# Patient Record
Sex: FEMALE | Race: WHITE | NOT HISPANIC OR LATINO | Employment: OTHER | URBAN - METROPOLITAN AREA
[De-identification: names, ages, dates, MRNs, and addresses within clinical notes are randomized per-mention and may not be internally consistent; named-entity substitution may affect disease eponyms.]

---

## 2017-02-13 ENCOUNTER — GENERIC CONVERSION - ENCOUNTER (OUTPATIENT)
Dept: OTHER | Facility: OTHER | Age: 22
End: 2017-02-13

## 2017-05-01 ENCOUNTER — GENERIC CONVERSION - ENCOUNTER (OUTPATIENT)
Dept: OTHER | Facility: OTHER | Age: 22
End: 2017-05-01

## 2017-05-22 ENCOUNTER — ALLSCRIPTS OFFICE VISIT (OUTPATIENT)
Dept: OTHER | Facility: OTHER | Age: 22
End: 2017-05-22

## 2017-05-30 LAB
ADDITIONAL INFORMATION (HISTORICAL): NORMAL
ADEQUACY: (HISTORICAL): NORMAL
CYTOTECHNOLOGIST: (HISTORICAL): NORMAL
INTERPRETATION (HISTORICAL): NORMAL
LMP (HISTORICAL): NORMAL
PREV. BX: (HISTORICAL): NORMAL
PREV. PAP (HISTORICAL): NORMAL
SOURCE (HISTORICAL): NORMAL

## 2017-07-06 ENCOUNTER — ALLSCRIPTS OFFICE VISIT (OUTPATIENT)
Dept: OTHER | Facility: OTHER | Age: 22
End: 2017-07-06

## 2018-01-12 VITALS
HEIGHT: 65 IN | BODY MASS INDEX: 25.99 KG/M2 | DIASTOLIC BLOOD PRESSURE: 76 MMHG | WEIGHT: 156 LBS | SYSTOLIC BLOOD PRESSURE: 106 MMHG

## 2018-01-12 VITALS
SYSTOLIC BLOOD PRESSURE: 112 MMHG | BODY MASS INDEX: 26.16 KG/M2 | HEIGHT: 65 IN | WEIGHT: 157 LBS | DIASTOLIC BLOOD PRESSURE: 72 MMHG

## 2018-01-16 NOTE — PROGRESS NOTES
Active Problems    1  Contraception (V25 9) (Z30 9)    Current Meds   1  MedroxyPROGESTERone Acetate 150 MG/ML Intramuscular Suspension; INJECT   EVERY 12 WEEKS AS DIRECTED; Therapy: [id number] to (Last Rx:08Sep2016)  Requested for: 08Sep2016; Status:   ACTIVE - Retrospective By Protocol Authorization Ordered    Vitals  Signs    Systolic: 620  Diastolic: 70  Height: 5 ft 5 in  Weight: 154 lb   BMI Calculated: 25 63  BSA Calculated: 1 77    Plan  Contraception    · MedroxyPROGESTERone Acetate 150 MG/ML Intramuscular Suspension  (Depo-Provera); INJECT EVERY 12 WEEKS AS DIRECTED   · Depo-Provera 150 MG/ML Intramuscular Suspension  (MedroxyPROGESTERone Acetate)   · Urine HCG- POC; Status:Active - Perform Order; Requested [id number];     Discussion/Summary    Pt gave herself depo injection on 6/24/16, per pt Jaron Magallon RN told her that she could administer her own depo because she was not able to come in at scheduled time or reschedule her appointment    Pt was supposed to call in with ht, wt, BP, lot # and expiration  Pt did not call in with any of this information  Pt threw out her medication box and forgot to call it in  [facility] to [facility]  KA ordered hpt test prior to administration, and pt must have RN visit and never give herself injection again  Pt verbalized understanding  hpt was negative  Depo injection administered  Pt to schedule next depo for 11 wks        Future Appointments    Date/Time Provider Specialty Site   11/23/2016 09:30 AM OB/GYN [facility], Nurse Schedule  [facility]     Signatures   Electronically signed by : Vance Spangler RN; Sep  8 2016 10:47AM EST                       (Author)    Electronically signed by : Neil García DO; Sep  8 2016  2:22PM EST                       (Author)

## 2018-01-22 VITALS
WEIGHT: 157 LBS | BODY MASS INDEX: 26.16 KG/M2 | DIASTOLIC BLOOD PRESSURE: 76 MMHG | SYSTOLIC BLOOD PRESSURE: 110 MMHG | HEIGHT: 65 IN

## 2018-01-22 VITALS
BODY MASS INDEX: 26.16 KG/M2 | WEIGHT: 157 LBS | SYSTOLIC BLOOD PRESSURE: 110 MMHG | DIASTOLIC BLOOD PRESSURE: 70 MMHG | HEIGHT: 65 IN

## 2018-05-10 ENCOUNTER — ANNUAL EXAM (OUTPATIENT)
Dept: OBGYN CLINIC | Facility: CLINIC | Age: 23
End: 2018-05-10
Payer: COMMERCIAL

## 2018-05-10 VITALS
WEIGHT: 143 LBS | SYSTOLIC BLOOD PRESSURE: 102 MMHG | DIASTOLIC BLOOD PRESSURE: 66 MMHG | BODY MASS INDEX: 23.82 KG/M2 | HEIGHT: 65 IN

## 2018-05-10 DIAGNOSIS — Z01.419 ENCOUNTER FOR ANNUAL ROUTINE GYNECOLOGICAL EXAMINATION: Primary | ICD-10-CM

## 2018-05-10 DIAGNOSIS — T83.32XD INTRAUTERINE CONTRACEPTIVE DEVICE THREADS LOST, SUBSEQUENT ENCOUNTER: ICD-10-CM

## 2018-05-10 PROCEDURE — S0612 ANNUAL GYNECOLOGICAL EXAMINA: HCPCS | Performed by: OBSTETRICS & GYNECOLOGY

## 2018-05-10 RX ORDER — CYANOCOBALAMIN (VITAMIN B-12) 500 MCG
LOZENGE ORAL
COMMUNITY
End: 2018-10-30

## 2018-05-10 RX ORDER — BUSPIRONE HYDROCHLORIDE 5 MG/1
TABLET ORAL
COMMUNITY
End: 2018-05-10 | Stop reason: ALTCHOICE

## 2018-05-10 RX ORDER — CLONAZEPAM 0.5 MG/1
TABLET ORAL
COMMUNITY
End: 2018-05-10 | Stop reason: ALTCHOICE

## 2018-05-10 RX ORDER — ALPRAZOLAM 0.5 MG/1
TABLET ORAL 4 TIMES DAILY PRN
COMMUNITY
Start: 2018-02-23 | End: 2022-05-30

## 2018-05-10 NOTE — PROGRESS NOTES
Assessment/Plan:    Pap smear deferred due to low risk status  Encouraged self-breast examination as well as calcium supplementation  Recommend pelvic ultrasound follow-up IUD  I will notify her the above results via telephone  Provided the above is normal she will return to office in 1 year  No problem-specific Assessment & Plan notes found for this encounter  Diagnoses and all orders for this visit:    Encounter for annual routine gynecological examination    Intrauterine contraceptive device threads lost, subsequent encounter  -     US pelvis complete w transvaginal; Future    Other orders  -     ALPRAZolam (XANAX) 0 5 mg tablet;   -     Discontinue: busPIRone (BUSPAR) 5 mg tablet; TAKE 1 TABLET TWICE A DAY  -     Discontinue: clonazePAM (KlonoPIN) 0 5 mg tablet; TAKE 1 TABLET AT BEDTIME  -     mupirocin (BACTROBAN) 2 % ointment; Apply into each nostril 2 (two) times a day for 5 days  Use one-half of tube in each nostril, press sides of nose together and gently massage   -     Vitamin E 400 units TABS; Take by mouth          Subjective:      Patient ID: Jocy Garcia is a 25 y o  female  HPI     This is a 26-year-old female  who presents for annual gyn exam   She had the Mirena IUD inserted on 2017  She states the first several months she had severe menstrual cramping which then eventually resolved  She denies any vaginal bleeding or spotting over the last 6 months  She is sexually active and has been in a monogamous relationship for the last 3 years  She is unable to palpate the IUD string  Her last Pap smear 2017 within normal limits  She did receive the Gardasil vaccine at age 13    Patient is currently finishing her antolin year of college, major [employer]    The following portions of the patient's history were reviewed and updated as appropriate: allergies, current medications, past family history, past medical history, past social history, past surgical history and problem list     Review of Systems   Constitutional: Negative for fatigue, fever and unexpected weight change  Respiratory: Negative for cough, chest tightness, shortness of breath and wheezing  Cardiovascular: Negative  Negative for chest pain and palpitations  Gastrointestinal: Negative  Negative for abdominal distention, abdominal pain, blood in stool, constipation, diarrhea, nausea and vomiting  Genitourinary: Negative  Negative for difficulty urinating, dyspareunia, dysuria, flank pain, frequency, genital sores, hematuria, pelvic pain, urgency, vaginal bleeding, vaginal discharge and vaginal pain  Skin: Negative for rash  Objective:      /66   Ht 5' 5" (1 651 m)   Wt 64 9 kg (143 lb)   LMP  (LMP Unknown)   Breastfeeding? No   BMI 23 80 kg/m²          Physical Exam   Constitutional: She appears well-developed and well-nourished  Cardiovascular: Normal rate and regular rhythm  Pulmonary/Chest: Effort normal and breath sounds normal  Right breast exhibits no inverted nipple, no mass, no nipple discharge, no skin change and no tenderness  Left breast exhibits no inverted nipple, no mass, no nipple discharge, no skin change and no tenderness  Abdominal: Soft  Bowel sounds are normal  She exhibits no distension  There is no tenderness  There is no rebound and no guarding  Genitourinary: Vagina normal and uterus normal  There is no lesion on the right labia  There is no lesion on the left labia  Cervix exhibits no discharge and no friability  Right adnexum displays no mass, no tenderness and no fullness  Left adnexum displays no mass, no tenderness and no fullness  No vaginal discharge found  Genitourinary Comments:  The IUD string is not visualized today

## 2018-05-17 ENCOUNTER — HOSPITAL ENCOUNTER (OUTPATIENT)
Dept: RADIOLOGY | Facility: HOSPITAL | Age: 23
Discharge: HOME | End: 2018-05-17
Attending: OBSTETRICS & GYNECOLOGY
Payer: COMMERCIAL

## 2018-05-17 DIAGNOSIS — T83.32XD INTRAUTERINE CONTRACEPTIVE DEVICE THREADS LOST, SUBSEQUENT ENCOUNTER: ICD-10-CM

## 2018-05-17 PROCEDURE — 76856 US EXAM PELVIC COMPLETE: CPT

## 2018-05-21 ENCOUNTER — TELEPHONE (OUTPATIENT)
Dept: OBGYN CLINIC | Facility: CLINIC | Age: 23
End: 2018-05-21

## 2018-05-21 NOTE — TELEPHONE ENCOUNTER
----- Message from Jill Butterfield DO sent at 5/21/2018  2:11 PM EDT -----  Inform pt US reveals proper location of IUD, resume annual gyn exams

## 2018-09-04 ENCOUNTER — TELEPHONE (OUTPATIENT)
Dept: OBGYN CLINIC | Facility: CLINIC | Age: 23
End: 2018-09-04

## 2018-09-04 NOTE — TELEPHONE ENCOUNTER
Pt having sx of nausea/vomiting intermittently past 2 - 2 1/2 wks - had Mirena IUD placed 7/6/17 with IUD showing proper location - had yearly 5/2018 then f/u u/s to check IUD placement which was satisfactory  She is concerned about pregnancy - recom to do HPT then if neg to repeat in 2 wks after last time of intercourse (which was 9/3/18)  She will recall with result - had scheduled appt here for next wk when she called today

## 2018-09-04 NOTE — TELEPHONE ENCOUNTER
Pt wants to know if can have blood work prior to having appointment with dr Aubree Meza  Do to wait gain, tenderness, moodiness, dizziness   please call [phone]

## 2018-09-11 ENCOUNTER — OFFICE VISIT (OUTPATIENT)
Dept: OBGYN CLINIC | Facility: CLINIC | Age: 23
End: 2018-09-11
Payer: COMMERCIAL

## 2018-09-11 VITALS
WEIGHT: 144 LBS | BODY MASS INDEX: 23.99 KG/M2 | DIASTOLIC BLOOD PRESSURE: 62 MMHG | HEIGHT: 65 IN | SYSTOLIC BLOOD PRESSURE: 108 MMHG

## 2018-09-11 DIAGNOSIS — N91.2 AMENORRHEA: Primary | ICD-10-CM

## 2018-09-11 PROCEDURE — 99214 OFFICE O/P EST MOD 30 MIN: CPT | Performed by: OBSTETRICS & GYNECOLOGY

## 2018-09-11 NOTE — PROGRESS NOTES
Assessment/Plan:    Will check quantitative HCG  Patient will call for results tomorrow  She would like to have the IUD removed under anesthesia  We will discuss this further tomorrow  All questions answered  No problem-specific Assessment & Plan notes found for this encounter  Diagnoses and all orders for this visit:    Amenorrhea  -     hCG, quantitative  -     hCG, quantitative          Subjective:      Patient ID: Keshav Oliveira is a 21 y o  female  HPI     This is a 25-year-old female  (SAB times 2) presents complaining of nausea, weight gain, fatigue over the last 4 weeks  She has also had some episodes of vomiting  She did check a home pregnancy test 7 days prior which was negative  She has had the Mirena IUD now first 1 year  She denies any vaginal bleeding or spotting/staining over the last 8 months  On her last annual visit  the IUD string was not visualized and a pelvic ultrasound was performed which had confirmed proper location of the IUD  She is sexually active and has been in a monogamous relationship for 3 years  Her partner have been discussing pregnancy in the near future  At this point she would like to have the IUD removed  We discussed performing under anesthesia versus attempting Cytotec intravaginally followed by attempting removing IUD in the office  The following portions of the patient's history were reviewed and updated as appropriate: allergies, current medications, past family history, past medical history, past social history, past surgical history and problem list     Review of Systems   Constitutional: Positive for fatigue  Negative for fever and unexpected weight change  Respiratory: Negative for cough, chest tightness, shortness of breath and wheezing  Cardiovascular: Negative  Negative for chest pain and palpitations  Gastrointestinal: Positive for nausea   Negative for abdominal distention, abdominal pain, blood in stool, constipation, diarrhea and vomiting  Genitourinary: Negative  Negative for difficulty urinating, dyspareunia, dysuria, flank pain, frequency, genital sores, hematuria, pelvic pain, urgency, vaginal bleeding, vaginal discharge and vaginal pain  Skin: Negative for rash  Objective:      /62   Ht 5' 5" (1 651 m)   Wt 65 3 kg (144 lb)   Breastfeeding? No   BMI 23 96 kg/m²          Physical Exam      External genitalia is within normal limits  The vagina is well estrogenized  Cervix is small the os is closed  Again, the IUD string is not visualized today

## 2018-09-12 ENCOUNTER — TRANSCRIBE ORDERS (OUTPATIENT)
Dept: LAB | Facility: CLINIC | Age: 23
End: 2018-09-12

## 2018-09-12 ENCOUNTER — APPOINTMENT (OUTPATIENT)
Dept: LAB | Facility: CLINIC | Age: 23
End: 2018-09-12
Payer: COMMERCIAL

## 2018-09-12 DIAGNOSIS — N91.2 AMENORRHEA: Primary | ICD-10-CM

## 2018-09-12 DIAGNOSIS — N91.2 AMENORRHEA: ICD-10-CM

## 2018-09-12 LAB — B-HCG SERPL-ACNC: <2 MIU/ML

## 2018-09-12 PROCEDURE — 36415 COLL VENOUS BLD VENIPUNCTURE: CPT | Performed by: OBSTETRICS & GYNECOLOGY

## 2018-09-12 PROCEDURE — 84702 CHORIONIC GONADOTROPIN TEST: CPT | Performed by: OBSTETRICS & GYNECOLOGY

## 2018-09-24 ENCOUNTER — TELEPHONE (OUTPATIENT)
Dept: OBGYN CLINIC | Facility: CLINIC | Age: 23
End: 2018-09-24

## 2018-09-24 NOTE — TELEPHONE ENCOUNTER
Pt informed of surgery date 10-25-18 in PM, pre-op appt scheduled  Informed patient may need pre-op labs, will give lab slip at pre-op appt and will inform if any additional testing is needed

## 2018-09-26 PROBLEM — Z30.432 ENCOUNTER FOR REMOVAL OF INTRAUTERINE CONTRACEPTIVE DEVICE: Status: ACTIVE | Noted: 2018-09-26

## 2018-10-11 ENCOUNTER — OFFICE VISIT (OUTPATIENT)
Dept: OBGYN CLINIC | Facility: CLINIC | Age: 23
End: 2018-10-11

## 2018-10-11 VITALS
WEIGHT: 145.6 LBS | SYSTOLIC BLOOD PRESSURE: 110 MMHG | DIASTOLIC BLOOD PRESSURE: 62 MMHG | HEIGHT: 65 IN | BODY MASS INDEX: 24.26 KG/M2

## 2018-10-11 DIAGNOSIS — Z01.818 PREOP EXAMINATION: Primary | ICD-10-CM

## 2018-10-11 PROCEDURE — PREOP: Performed by: OBSTETRICS & GYNECOLOGY

## 2018-10-11 NOTE — PROGRESS NOTES
Assessment/Plan:    Patient was consented for examine anesthesia, removal of IUD, possible hysteroscopy  She understands the risks and benefits and alternative treatment in grease to proceed  Risks including but not limited to infection, venous thrombosis, pulmonary embolism, wound healing problems, blood loss, damage to other organs including uterus, bowel, bladder  Will obtain pre admission labs  She will schedule her 2 week postop check  No problem-specific Assessment & Plan notes found for this encounter  There are no diagnoses linked to this encounter  Subjective:      Patient ID: Deann Olivas is a 21 y o  female  HPI     This is a 66-year-old female  (SAB x2) presents for preoperative counseling  Patient is scheduled for examine anesthesia, removal of IUD, possible hysteroscopy  She had requested to have her IUD removed after placement approximately 1 year ago  Attempt was made to remove the IUD in office in was unsuccessful  She did have a pelvic ultrasound done 2018 which had revealed proper location of the IUD  Patient has been amenorrheic for the last 8 months  She has been periodically checking home pregnancy test which were negative  Overall she did not like the effects of the IUD including nausea, weight gain, fatigue  Her her partner are interested in pregnancy in the near future  The following portions of the patient's history were reviewed and updated as appropriate: allergies, current medications, past family history, past medical history, past social history, past surgical history and problem list     Review of Systems   Constitutional: Negative for fatigue, fever and unexpected weight change  Respiratory: Negative for cough, chest tightness, shortness of breath and wheezing  Cardiovascular: Negative  Negative for chest pain and palpitations  Gastrointestinal: Negative    Negative for abdominal distention, abdominal pain, blood in stool, constipation, diarrhea, nausea and vomiting  Genitourinary: Negative  Negative for difficulty urinating, dyspareunia, dysuria, flank pain, frequency, genital sores, hematuria, pelvic pain, urgency, vaginal bleeding, vaginal discharge and vaginal pain  Skin: Negative for rash  Objective:      /62   Ht 5' 5" (1 651 m)   Wt 66 kg (145 lb 9 6 oz)   Breastfeeding? No   BMI 24 23 kg/m²          Physical Exam   Constitutional: She appears well-developed and well-nourished  Cardiovascular: Normal rate and regular rhythm  Pulmonary/Chest: Effort normal and breath sounds normal  Right breast exhibits no inverted nipple, no mass, no nipple discharge, no skin change and no tenderness  Left breast exhibits no inverted nipple, no mass, no nipple discharge, no skin change and no tenderness  Abdominal: Soft  Bowel sounds are normal  She exhibits no distension  There is no tenderness  There is no rebound and no guarding  Genitourinary: Vagina normal and uterus normal  There is no lesion on the right labia  There is no lesion on the left labia  Cervix exhibits no discharge and no friability  Right adnexum displays no mass, no tenderness and no fullness  Left adnexum displays no mass, no tenderness and no fullness  No vaginal discharge found

## 2018-10-30 NOTE — PRE-PROCEDURE INSTRUCTIONS
Pre-Surgery Instructions:   Medication Instructions    ALPRAZolam (XANAX) 0 5 mg tablet Instructed patient per Anesthesia Guidelines  REVIEWED  PRINTED SURGICAL INSTRUCTIONS WITH PATIENT , PATIENT VERBALIZED UNDERSTANDING   MEDICATIONS REVIEWED

## 2018-10-31 NOTE — H&P
H&P Exam - Gynecology   Radha Washburn 21 y o  female MRN: [id number]  Unit/Bed#:  Encounter: [id number]    Assessment/Plan     Assessment:    Retained IUD    Plan:    Patient was consented for examine anesthesia, removal of IUD, possible hysteroscopy  She understands the risks and benefits and alternative treatment in grease to proceed  Risks including but not limited to infection, venous thrombosis, pulmonary embolism, wound healing problems, blood loss, damage to other organs including uterus, bowel, bladder  Will obtain pre admission labs  She will schedule her 2 week postop check  History of Present Illness     HPI:  Radha Washburn is a 21 y o  female  (SAB x2) scheduled for examine anesthesia, removal of IUD, possible hysteroscopy  She had requested to have her IUD removed after placement approximately 1 year ago  Attempt was made to remove the IUD in office in was unsuccessful  She did have a pelvic ultrasound done 2018 which had revealed proper location of the IUD  Patient has been amenorrheic for the last 8 months  She has been periodically checking home pregnancy test which were negative  Overall she did not like the effects of the IUD including nausea, weight gain, fatigue  Her her partner are interested in pregnancy in the near future       Review of Systems   Constitutional: Negative for fatigue, fever and unexpected weight change  Respiratory: Negative for cough, chest tightness, shortness of breath and wheezing  Cardiovascular: Negative  Negative for chest pain and palpitations  Gastrointestinal: Negative  Negative for abdominal distention, abdominal pain, blood in stool, constipation, diarrhea, nausea and vomiting  Genitourinary: Negative  Negative for difficulty urinating, dyspareunia, dysuria, flank pain, frequency, genital sores, hematuria, pelvic pain, urgency, vaginal bleeding, vaginal discharge and vaginal pain  Skin: Negative for rash         Historical Information   Past Medical History:   Diagnosis Date    Acne     Seasonal depression ([facility] [address] )      Past Surgical History:   Procedure Laterality Date    COLONOSCOPY      RHINOPLASTY  2013    WISDOM TOOTH EXTRACTION       OB/GYN History: as above    Family History   Problem Relation Age of Onset    Colon cancer Maternal Grandmother      Social History   History   Alcohol Use No     History   Drug Use No     History   Smoking Status    Never Smoker   Smokeless Tobacco    Never Used       Meds/Allergies   all current active meds have been reviewed  Allergies   Allergen Reactions    Keo Flavor      anaphylaxis    Pineapple Anaphylaxis     Anaphylaxis    Zithromax [Azithromycin] Anaphylaxis    Latex        Objective   Vitals: Weight 63 5 kg (140 lb), not currently breastfeeding  No intake or output data in the 24 hours ending 10/31/18 0749    Invasive Devices: Invasive Devices          No matching active lines, drains, or airways          Physical Exam   Constitutional: She appears well-developed and well-nourished  Cardiovascular: Normal rate and regular rhythm  Pulmonary/Chest: Effort normal and breath sounds normal  Right breast exhibits no inverted nipple, no mass, no nipple discharge, no skin change and no tenderness  Left breast exhibits no inverted nipple, no mass, no nipple discharge, no skin change and no tenderness  Abdominal: Soft  Bowel sounds are normal  She exhibits no distension  There is no tenderness  There is no rebound and no guarding  Genitourinary: Vagina normal and uterus normal  There is no lesion on the right labia  There is no lesion on the left labia  Cervix exhibits no discharge and no friability  Right adnexum displays no mass, no tenderness and no fullness  Left adnexum displays no mass, no tenderness and no fullness  No vaginal discharge found  Lab Results: I have personally reviewed pertinent reports  Imaging: I have personally reviewed pertinent reports  EKG, Pathology, and Other Studies: I have personally reviewed pertinent reports  Code Status: No Order  Advance Directive and Living Will:      Power of :    POLST:      Counseling / Coordination of Care  Total floor / unit time spent today 30 minutes  Greater than 50% of total time was spent with the patient and / or family counseling and / or coordination of care

## 2018-11-05 ENCOUNTER — APPOINTMENT (OUTPATIENT)
Dept: LAB | Facility: CLINIC | Age: 23
End: 2018-11-05
Payer: COMMERCIAL

## 2018-11-05 ENCOUNTER — ANESTHESIA EVENT (OUTPATIENT)
Dept: PERIOP | Facility: HOSPITAL | Age: 23
End: 2018-11-05
Payer: COMMERCIAL

## 2018-11-05 DIAGNOSIS — Z01.818 PREOP TESTING: ICD-10-CM

## 2018-11-05 LAB
ANION GAP SERPL CALCULATED.3IONS-SCNC: 3 MMOL/L (ref 4–13)
B-HCG SERPL-ACNC: <2 MIU/ML
BASOPHILS # BLD AUTO: 0.04 THOUSANDS/ΜL (ref 0–0.1)
BASOPHILS NFR BLD AUTO: 1 % (ref 0–1)
BUN SERPL-MCNC: 10 MG/DL (ref 5–25)
CALCIUM SERPL-MCNC: 9.3 MG/DL (ref 8.3–10.1)
CHLORIDE SERPL-SCNC: 107 MMOL/L (ref 100–108)
CO2 SERPL-SCNC: 28 MMOL/L (ref 21–32)
CREAT SERPL-MCNC: 0.92 MG/DL (ref 0.6–1.3)
EOSINOPHIL # BLD AUTO: 0.13 THOUSAND/ΜL (ref 0–0.61)
EOSINOPHIL NFR BLD AUTO: 2 % (ref 0–6)
ERYTHROCYTE [DISTWIDTH] IN BLOOD BY AUTOMATED COUNT: 11.8 % (ref 11.6–15.1)
GFR SERPL CREATININE-BSD FRML MDRD: 88 ML/MIN/1.73SQ M
GLUCOSE SERPL-MCNC: 70 MG/DL (ref 65–140)
HCT VFR BLD AUTO: 41.1 % (ref 34.8–46.1)
HGB BLD-MCNC: 13.3 G/DL (ref 11.5–15.4)
IMM GRANULOCYTES # BLD AUTO: 0.02 THOUSAND/UL (ref 0–0.2)
IMM GRANULOCYTES NFR BLD AUTO: 0 % (ref 0–2)
LYMPHOCYTES # BLD AUTO: 2.29 THOUSANDS/ΜL (ref 0.6–4.47)
LYMPHOCYTES NFR BLD AUTO: 40 % (ref 14–44)
MCH RBC QN AUTO: 29.8 PG (ref 26.8–34.3)
MCHC RBC AUTO-ENTMCNC: 32.4 G/DL (ref 31.4–37.4)
MCV RBC AUTO: 92 FL (ref 82–98)
MONOCYTES # BLD AUTO: 0.49 THOUSAND/ΜL (ref 0.17–1.22)
MONOCYTES NFR BLD AUTO: 8 % (ref 4–12)
NEUTROPHILS # BLD AUTO: 2.83 THOUSANDS/ΜL (ref 1.85–7.62)
NEUTS SEG NFR BLD AUTO: 49 % (ref 43–75)
NRBC BLD AUTO-RTO: 0 /100 WBCS
PLATELET # BLD AUTO: 271 THOUSANDS/UL (ref 149–390)
PMV BLD AUTO: 9.5 FL (ref 8.9–12.7)
POTASSIUM SERPL-SCNC: 3.7 MMOL/L (ref 3.5–5.3)
RBC # BLD AUTO: 4.47 MILLION/UL (ref 3.81–5.12)
SODIUM SERPL-SCNC: 138 MMOL/L (ref 136–145)
WBC # BLD AUTO: 5.8 THOUSAND/UL (ref 4.31–10.16)

## 2018-11-05 PROCEDURE — 84702 CHORIONIC GONADOTROPIN TEST: CPT | Performed by: OBSTETRICS & GYNECOLOGY

## 2018-11-05 PROCEDURE — 80048 BASIC METABOLIC PNL TOTAL CA: CPT

## 2018-11-05 PROCEDURE — 85025 COMPLETE CBC W/AUTO DIFF WBC: CPT

## 2018-11-05 PROCEDURE — 36415 COLL VENOUS BLD VENIPUNCTURE: CPT

## 2018-11-06 ENCOUNTER — ANESTHESIA (OUTPATIENT)
Dept: PERIOP | Facility: HOSPITAL | Age: 23
End: 2018-11-06
Payer: COMMERCIAL

## 2018-11-06 ENCOUNTER — HOSPITAL ENCOUNTER (OUTPATIENT)
Facility: HOSPITAL | Age: 23
Setting detail: OUTPATIENT SURGERY
Discharge: HOME | End: 2018-11-06
Attending: OBSTETRICS & GYNECOLOGY | Admitting: OBSTETRICS & GYNECOLOGY
Payer: COMMERCIAL

## 2018-11-06 VITALS
RESPIRATION RATE: 16 BRPM | BODY MASS INDEX: 23.32 KG/M2 | HEIGHT: 65 IN | OXYGEN SATURATION: 100 % | HEART RATE: 62 BPM | TEMPERATURE: 96.9 F | DIASTOLIC BLOOD PRESSURE: 61 MMHG | WEIGHT: 140 LBS | SYSTOLIC BLOOD PRESSURE: 107 MMHG

## 2018-11-06 PROBLEM — M25.569 KNEE PAIN: Status: ACTIVE | Noted: 2018-11-06

## 2018-11-06 PROBLEM — M25.531 RIGHT WRIST PAIN: Status: ACTIVE | Noted: 2017-01-10

## 2018-11-06 PROBLEM — M70.50: Status: ACTIVE | Noted: 2018-11-06

## 2018-11-06 PROBLEM — S83.529A: Status: ACTIVE | Noted: 2018-11-06

## 2018-11-06 PROCEDURE — 58301 REMOVE INTRAUTERINE DEVICE: CPT | Performed by: OBSTETRICS & GYNECOLOGY

## 2018-11-06 RX ORDER — LIDOCAINE HYDROCHLORIDE 10 MG/ML
INJECTION, SOLUTION INFILTRATION; PERINEURAL AS NEEDED
Status: DISCONTINUED | OUTPATIENT
Start: 2018-11-06 | End: 2018-11-06 | Stop reason: SURG

## 2018-11-06 RX ORDER — KETOROLAC TROMETHAMINE 30 MG/ML
INJECTION, SOLUTION INTRAMUSCULAR; INTRAVENOUS AS NEEDED
Status: DISCONTINUED | OUTPATIENT
Start: 2018-11-06 | End: 2018-11-06 | Stop reason: SURG

## 2018-11-06 RX ORDER — OXYCODONE HYDROCHLORIDE AND ACETAMINOPHEN 5; 325 MG/1; MG/1
1 TABLET ORAL EVERY 4 HOURS PRN
Status: DISCONTINUED | OUTPATIENT
Start: 2018-11-06 | End: 2018-11-06 | Stop reason: HOSPADM

## 2018-11-06 RX ORDER — SODIUM CHLORIDE, SODIUM LACTATE, POTASSIUM CHLORIDE, CALCIUM CHLORIDE 600; 310; 30; 20 MG/100ML; MG/100ML; MG/100ML; MG/100ML
125 INJECTION, SOLUTION INTRAVENOUS CONTINUOUS
Status: DISCONTINUED | OUTPATIENT
Start: 2018-11-06 | End: 2018-11-06 | Stop reason: HOSPADM

## 2018-11-06 RX ORDER — METOCLOPRAMIDE HYDROCHLORIDE 5 MG/ML
10 INJECTION INTRAMUSCULAR; INTRAVENOUS ONCE AS NEEDED
Status: COMPLETED | OUTPATIENT
Start: 2018-11-06 | End: 2018-11-06

## 2018-11-06 RX ORDER — AMOXICILLIN 500 MG/1
500 TABLET, FILM COATED ORAL
COMMUNITY
End: 2018-12-13

## 2018-11-06 RX ORDER — MEPERIDINE HYDROCHLORIDE 25 MG/ML
12.5 INJECTION INTRAMUSCULAR; INTRAVENOUS; SUBCUTANEOUS
Status: DISCONTINUED | OUTPATIENT
Start: 2018-11-06 | End: 2018-11-06 | Stop reason: HOSPADM

## 2018-11-06 RX ORDER — DIPHENHYDRAMINE HYDROCHLORIDE 50 MG/ML
12.5 INJECTION INTRAMUSCULAR; INTRAVENOUS ONCE AS NEEDED
Status: DISCONTINUED | OUTPATIENT
Start: 2018-11-06 | End: 2018-11-06 | Stop reason: HOSPADM

## 2018-11-06 RX ORDER — FENTANYL CITRATE/PF 50 MCG/ML
50 SYRINGE (ML) INJECTION
Status: COMPLETED | OUTPATIENT
Start: 2018-11-06 | End: 2018-11-06

## 2018-11-06 RX ORDER — ONDANSETRON 2 MG/ML
4 INJECTION INTRAMUSCULAR; INTRAVENOUS EVERY 6 HOURS PRN
Status: DISCONTINUED | OUTPATIENT
Start: 2018-11-06 | End: 2018-11-06 | Stop reason: HOSPADM

## 2018-11-06 RX ORDER — ONDANSETRON 2 MG/ML
INJECTION INTRAMUSCULAR; INTRAVENOUS AS NEEDED
Status: DISCONTINUED | OUTPATIENT
Start: 2018-11-06 | End: 2018-11-06 | Stop reason: SURG

## 2018-11-06 RX ORDER — PROPOFOL 10 MG/ML
INJECTION, EMULSION INTRAVENOUS AS NEEDED
Status: DISCONTINUED | OUTPATIENT
Start: 2018-11-06 | End: 2018-11-06 | Stop reason: SURG

## 2018-11-06 RX ORDER — OXYCODONE HYDROCHLORIDE AND ACETAMINOPHEN 5; 325 MG/1; MG/1
2 TABLET ORAL EVERY 4 HOURS PRN
Status: DISCONTINUED | OUTPATIENT
Start: 2018-11-06 | End: 2018-11-06 | Stop reason: HOSPADM

## 2018-11-06 RX ORDER — ACETAMINOPHEN 325 MG/1
650 TABLET ORAL EVERY 6 HOURS PRN
Status: DISCONTINUED | OUTPATIENT
Start: 2018-11-06 | End: 2018-11-06 | Stop reason: HOSPADM

## 2018-11-06 RX ORDER — HYDROMORPHONE HCL/PF 1 MG/ML
0.2 SYRINGE (ML) INJECTION
Status: DISCONTINUED | OUTPATIENT
Start: 2018-11-06 | End: 2018-11-06 | Stop reason: HOSPADM

## 2018-11-06 RX ORDER — ONDANSETRON 2 MG/ML
4 INJECTION INTRAMUSCULAR; INTRAVENOUS ONCE AS NEEDED
Status: COMPLETED | OUTPATIENT
Start: 2018-11-06 | End: 2018-11-06

## 2018-11-06 RX ORDER — LORAZEPAM 2 MG/ML
0.5 INJECTION INTRAMUSCULAR ONCE
Status: COMPLETED | OUTPATIENT
Start: 2018-11-06 | End: 2018-11-06

## 2018-11-06 RX ORDER — ALPRAZOLAM 0.5 MG/1
0.5 TABLET ORAL 3 TIMES DAILY PRN
COMMUNITY
Start: 2018-02-23 | End: 2018-11-06 | Stop reason: HOSPADM

## 2018-11-06 RX ORDER — MIDAZOLAM HYDROCHLORIDE 1 MG/ML
INJECTION INTRAMUSCULAR; INTRAVENOUS AS NEEDED
Status: DISCONTINUED | OUTPATIENT
Start: 2018-11-06 | End: 2018-11-06 | Stop reason: SURG

## 2018-11-06 RX ORDER — SODIUM CHLORIDE 9 MG/ML
125 INJECTION, SOLUTION INTRAVENOUS CONTINUOUS
Status: DISCONTINUED | OUTPATIENT
Start: 2018-11-06 | End: 2018-11-06 | Stop reason: HOSPADM

## 2018-11-06 RX ORDER — SCOLOPAMINE TRANSDERMAL SYSTEM 1 MG/1
1 PATCH, EXTENDED RELEASE TRANSDERMAL
Status: DISCONTINUED | OUTPATIENT
Start: 2018-11-06 | End: 2018-11-06 | Stop reason: HOSPADM

## 2018-11-06 RX ORDER — FENTANYL CITRATE 50 UG/ML
INJECTION, SOLUTION INTRAMUSCULAR; INTRAVENOUS AS NEEDED
Status: DISCONTINUED | OUTPATIENT
Start: 2018-11-06 | End: 2018-11-06 | Stop reason: SURG

## 2018-11-06 RX ORDER — IBUPROFEN 600 MG/1
600 TABLET ORAL EVERY 6 HOURS PRN
Status: DISCONTINUED | OUTPATIENT
Start: 2018-11-06 | End: 2018-11-06 | Stop reason: HOSPADM

## 2018-11-06 RX ADMIN — FENTANYL CITRATE 50 MCG: 50 INJECTION, SOLUTION INTRAMUSCULAR; INTRAVENOUS at 12:40

## 2018-11-06 RX ADMIN — SODIUM CHLORIDE, SODIUM LACTATE, POTASSIUM CHLORIDE, AND CALCIUM CHLORIDE: .6; .31; .03; .02 INJECTION, SOLUTION INTRAVENOUS at 12:08

## 2018-11-06 RX ADMIN — DEXAMETHASONE SODIUM PHOSPHATE 10 MG: 10 INJECTION INTRAMUSCULAR; INTRAVENOUS at 12:53

## 2018-11-06 RX ADMIN — SODIUM CHLORIDE, SODIUM LACTATE, POTASSIUM CHLORIDE, AND CALCIUM CHLORIDE 125 ML/HR: .6; .31; .03; .02 INJECTION, SOLUTION INTRAVENOUS at 11:45

## 2018-11-06 RX ADMIN — LIDOCAINE HYDROCHLORIDE 50 MG: 10 INJECTION, SOLUTION INFILTRATION; PERINEURAL at 12:37

## 2018-11-06 RX ADMIN — HYDROMORPHONE HYDROCHLORIDE 0.2 MG: 1 INJECTION, SOLUTION INTRAMUSCULAR; INTRAVENOUS; SUBCUTANEOUS at 14:00

## 2018-11-06 RX ADMIN — FENTANYL CITRATE 50 MCG: 50 INJECTION, SOLUTION INTRAMUSCULAR; INTRAVENOUS at 13:22

## 2018-11-06 RX ADMIN — MIDAZOLAM 2 MG: 1 INJECTION INTRAMUSCULAR; INTRAVENOUS at 12:27

## 2018-11-06 RX ADMIN — SCOPALAMINE 1 PATCH: 1 PATCH, EXTENDED RELEASE TRANSDERMAL at 12:08

## 2018-11-06 RX ADMIN — SODIUM CHLORIDE, SODIUM LACTATE, POTASSIUM CHLORIDE, AND CALCIUM CHLORIDE 125 ML/HR: .6; .31; .03; .02 INJECTION, SOLUTION INTRAVENOUS at 13:05

## 2018-11-06 RX ADMIN — PROPOFOL 200 MG: 10 INJECTION, EMULSION INTRAVENOUS at 12:37

## 2018-11-06 RX ADMIN — SODIUM CHLORIDE, SODIUM LACTATE, POTASSIUM CHLORIDE, AND CALCIUM CHLORIDE 125 ML/HR: .6; .31; .03; .02 INJECTION, SOLUTION INTRAVENOUS at 13:47

## 2018-11-06 RX ADMIN — METOCLOPRAMIDE 10 MG: 5 INJECTION, SOLUTION INTRAMUSCULAR; INTRAVENOUS at 13:50

## 2018-11-06 RX ADMIN — ONDANSETRON 4 MG: 2 INJECTION INTRAMUSCULAR; INTRAVENOUS at 12:53

## 2018-11-06 RX ADMIN — FENTANYL CITRATE 50 MCG: 50 INJECTION, SOLUTION INTRAMUSCULAR; INTRAVENOUS at 12:33

## 2018-11-06 RX ADMIN — LORAZEPAM 0.5 MG: 2 INJECTION INTRAMUSCULAR; INTRAVENOUS at 13:33

## 2018-11-06 RX ADMIN — FENTANYL CITRATE 50 MCG: 50 INJECTION, SOLUTION INTRAMUSCULAR; INTRAVENOUS at 13:27

## 2018-11-06 RX ADMIN — ONDANSETRON 4 MG: 2 INJECTION INTRAMUSCULAR; INTRAVENOUS at 13:36

## 2018-11-06 RX ADMIN — KETOROLAC TROMETHAMINE 30 MG: 30 INJECTION, SOLUTION INTRAMUSCULAR at 12:53

## 2018-11-06 NOTE — DISCHARGE INSTRUCTIONS
Procedural Sedation   WHAT YOU NEED TO KNOW:   Procedural sedation is medicine used during procedures to help you feel relaxed and calm  You will remember little to none of the procedure  After sedation you may feel tired, weak, or unsteady on your feet  You may also have trouble concentrating or short-term memory loss  These symptoms should go away in 24 hours or less  DISCHARGE INSTRUCTIONS:   Call 911 or have someone else call for any of the following:   · You have sudden trouble breathing  · You cannot be woken  Return to the emergency department if:   · You have a severe headache or dizziness  · Your heart is beating faster than usual   Contact your healthcare provider if:   · You have a fever or chills  · Your skin is itchy, swollen, or you have a rash  · You have nausea or are vomiting for more than 8 hours after the procedure  · You have questions or concerns about your condition or care  Self-care:   · Have someone stay with you for 24 hours  This person can drive you to errands and help you do things around the house  This person can also watch for problems  · Rest and do quiet activities for 24 hours  Do not exercise, ride a bike, or play sports  Stand up slowly to prevent dizziness and falls  Take short walks around the house with another person  Slowly return to your usual activities the next day  · Do not drive or use dangerous machines or tools for 24 hours  You may injure yourself or others  Examples include a lawnmower, saw, or drill  Do not return to work for 24 hours if you use dangerous machines or tools for work  · Do not make important decisions for 24 hours  For example, do not sign important papers or invest money  · Drink liquids as directed  Liquids help flush the sedation medicine out of your body  Ask how much liquid to drink each day and which liquids are best for you  · Eat small, frequent meals to prevent nausea and vomiting    Start with clear liquids such as juice or broth  If you do not vomit after clear liquids, you can eat your usual foods  · Do not drink alcohol or take medicines that make you drowsy  This includes medicines that help you sleep and anxiety medicines  Ask your healthcare provider if it is safe for you to take pain medicine  Follow up with your healthcare provider as directed:  Write down your questions so you remember to ask them during your visits  © 2017 [employer] [employer] Information is for End User's use only and may not be sold, redistributed or otherwise used for commercial purposes  All illustrations and images included in CareNotes® are the copyrighted property of A [employer]  or [employer]  The above information is an  only  It is not intended as medical advice for individual conditions or treatments  Talk to your doctor, nurse or pharmacist before following any medical regimen to see if it is safe and effective for you

## 2018-11-06 NOTE — ANESTHESIA POSTPROCEDURE EVALUATION
Post-Op Assessment Note      CV Status:  Stable    Mental Status:  Somnolent    Hydration Status:  Euvolemic and stable    PONV Controlled:  None    Airway Patency:  Patent    Post Op Vitals Reviewed: Yes          Staff: CRNA           BP      Temp      Pulse     Resp      SpO2

## 2018-11-06 NOTE — ANESTHESIA PREPROCEDURE EVALUATION
Review of Systems/Medical History      No history of anesthetic complications     Cardiovascular  Negative cardio ROS    Pulmonary  Negative pulmonary ROS        GI/Hepatic  Negative GI/hepatic ROS          Negative  ROS        Endo/Other  Negative endo/other ROS      GYN  Negative gynecology ROS          Hematology  Negative hematology ROS      Musculoskeletal  Negative musculoskeletal ROS        Neurology  Negative neurology ROS      Psychology   Depression ,              Physical Exam    Airway    Mallampati score: II  TM Distance: >3 FB  Neck ROM: full     Dental       Cardiovascular  Comment: Negative ROS,     Pulmonary      Other Findings        Anesthesia Plan  ASA Score- 2     Anesthesia Type- general with ASA Monitors  Additional Monitors:   Airway Plan: LMA  Comment: General anesthesia, LMA; standard ASA monitors  Risks and benefits discussed with patient; patient consented and agrees to proceed  I saw and evaluated the patient  If seen with CRNA, we have discussed the anesthetic plan and I am in agreement that the plan is appropriate for the patient   hcg quant < 2   On 11/5/18    Plan Factors-    Induction- intravenous  Postoperative Plan- Plan for postoperative opioid use  Planned trial extubation    Informed Consent- Anesthetic plan and risks discussed with patient  I personally reviewed this patient with the CRNA  Discussed and agreed on the Anesthesia Plan with the CRNA  Karlee Mcclain

## 2018-11-06 NOTE — PERIOPERATIVE NURSING NOTE
Dr Asuncion Nice called r/t patients uncontrolled and inconsolable crying and hysterics  Pt given per order 0 5mg ativan  Will continue to monitor

## 2018-11-06 NOTE — OP NOTE
OPERATIVE REPORT  PATIENT NAME: Caprice Valdez    :  1995  MRN: [id number]  Pt Location:  OR ROOM 03    SURGERY DATE: 2018    Surgeon(s) and Role:     * Janis Serna DO - Primary     * Yue Mckinney MD - Assisting    Preop Diagnosis:  Encounter for removal of intrauterine contraceptive device [Z30 432]    Post-Op Diagnosis Codes:     * Encounter for removal of intrauterine contraceptive device [Z30 432]    Procedure(s) (LRB):  EXAM UNDER ANESTHESIA (EUA)  WITH IUD REMOVAL (N/A)  REMOVAL OF INTRAUTERINE DEVICE (IUD) (N/A)    Specimen(s): None    Estimated Blood Loss: 0 mL    Drains: None     Anesthesia Type:   General LMA    Operative Indications:  Encounter for removal of intrauterine contraceptive device [Z30 432]    Operative Findings:  Anteverted uterus  Sounded to 9cm  Successful removal of intact IUD    Complications:   None    Procedure and Technique: All risks, benefits, and alternatives to the procedure were discussed with the patient and she had the opportunity to ask questions  Informed consent was obtained  Patient was taken to the operating room were a time out was performed to confirm correct patient and correct procedure  General LMA anesthesia (LMA) was administered and the patient was positioned on the OR table in the dorsal lithotomy position  All pressure points were padded and a holland hugger was placed to maintain control of core body temperature  A bimanual exam was performed and the uterus was noted to be anteverted, normal in size and consistency with no palpable adnexal masses or fullness  The patient was prepped and draped in the usual sterile fashion  A right angle retractor and was inserted into the vagina and a Danni retractor was used to visualize the anterior lip of the cervix, which was then grasped with an Allis clamp  The uterus was sounded to 9cm  The cervix was serially dilated to 23F using Graham dilators for introduction polyp grasper      The polyp grasper was introduced into the uterus  Two unsuccesful attempts were made to grasp the IUD  On the third attempt, the IUD was successfully grasped and removed from the uterus  The Allis clamp was removed from the anterior lip of the cervix  Good hemostasis was confirmed at the tenaculum puncture sites  Right angle retractor was removed  At the conclusion of the procedure, all needle, sponge, and instrument counts were noted to be correct x2  Patient tolerated the procedure well and was transferred to PACU in stable condition prior to discharge with follow up in 1-2 weeks  Dr Justin Ayala was present and participated in all key portions of the case        Patient Disposition:  PACU     SIGNATURE: Rosaura Mary MD  DATE: November 6, 2018  TIME: 1:06 PM

## 2018-12-08 ENCOUNTER — APPOINTMENT (OUTPATIENT)
Dept: RADIOLOGY | Facility: CLINIC | Age: 23
End: 2018-12-08
Attending: FAMILY MEDICINE
Payer: COMMERCIAL

## 2018-12-08 ENCOUNTER — OFFICE VISIT (OUTPATIENT)
Dept: URGENT CARE | Facility: CLINIC | Age: 23
End: 2018-12-08
Payer: COMMERCIAL

## 2018-12-08 VITALS
OXYGEN SATURATION: 100 % | BODY MASS INDEX: 23.32 KG/M2 | TEMPERATURE: 99.1 F | SYSTOLIC BLOOD PRESSURE: 100 MMHG | HEART RATE: 76 BPM | DIASTOLIC BLOOD PRESSURE: 70 MMHG | WEIGHT: 140 LBS | RESPIRATION RATE: 16 BRPM | HEIGHT: 65 IN

## 2018-12-08 DIAGNOSIS — S69.90XA FINGER INJURY, INITIAL ENCOUNTER: ICD-10-CM

## 2018-12-08 DIAGNOSIS — S62.654A CLOSED NONDISPLACED FRACTURE OF MIDDLE PHALANX OF RIGHT RING FINGER, INITIAL ENCOUNTER: Primary | ICD-10-CM

## 2018-12-08 PROCEDURE — 29130 APPL FINGER SPLINT STATIC: CPT

## 2018-12-08 PROCEDURE — 73140 X-RAY EXAM OF FINGER(S): CPT

## 2018-12-08 PROCEDURE — 99242 OFF/OP CONSLTJ NEW/EST SF 20: CPT | Performed by: FAMILY MEDICINE

## 2018-12-08 NOTE — PATIENT INSTRUCTIONS
1   Fracture of middle phalanx of right 4th digit   - xray shows findings consistent with fracture of right ring finger  - finger has been placed in a splint for comfort and support  - patient is to rest the hand and keep it elevated  - apply ice to the site   - take Tylenol or Motrin as needed for pain  - no sports or gym participation for now   - referral provided to Orthopedics for further evaluation and treatment

## 2018-12-08 NOTE — PROGRESS NOTES
[facility]        NAME: Toño Mccall is a 21 y o  female  : 1995    MRN: [id number]  DATE: 2018  TIME: 3:05 PM    Assessment and Plan   Closed nondisplaced fracture of middle phalanx of right ring finger, initial encounter [S60 396T]  1  Closed nondisplaced fracture of middle phalanx of right ring finger, initial encounter  XR finger right fourth digit-ring    Ambulatory referral to Orthopedic Surgery    Splint application         Patient Instructions     Patient Instructions   1  Fracture of middle phalanx of right 4th digit   - xray shows findings consistent with fracture of right ring finger  - finger has been placed in a splint for comfort and support  - patient is to rest the hand and keep it elevated  - apply ice to the site   - take Tylenol or Motrin as needed for pain  - no sports or gym participation for now   - referral provided to Orthopedics for further evaluation and treatment     Follow up with PCP in 3-5 days  Proceed to  ER if symptoms worsen  Chief Complaint     Chief Complaint   Patient presents with    Finger Injury     injured right ring finger while trying to hold on to horse reins         History of Present Illness       22 yo female presents for right 4th finger injury  She states she was riding her horse two days ago when she fell and while she was falling she continued to  onto the reins of the horse at which time she felt a sudden pain in her 4th digit  Since then she has noted diffuse pain, swelling, and bruising of her finger  She is able to move it but has pain w/ movement  No numbness/tingling or weakness of the hand  She has been wearing a finger splint over the digit, applying ice, and taking Advil as needed for pain  She has not yet been been seen for this injury  Review of Systems   Review of Systems   Constitutional: Negative  Musculoskeletal:        As noted in HPI   Skin:        As noted in HPI   Neurological: Negative  Current Medications       Current Outpatient Prescriptions:     ALPRAZolam (XANAX) 0 5 mg tablet, 4 (four) times a day as needed  , Disp: , Rfl:     amoxicillin (AMOXIL) 500 MG tablet, Take 500 mg by mouth, Disp: , Rfl:     Current Allergies     Allergies as of 12/08/2018 - Reviewed 12/08/2018   Allergen Reaction Noted    Keo flavor  06/30/2015    Pineapple Anaphylaxis 10/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    Zithromax [azithromycin] Anaphylaxis 11/14/2016    Latex Rash 07/06/2017            The following portions of the patient's history were reviewed and updated as appropriate: allergies, current medications, past family history, past medical history, past social history, past surgical history and problem list      Past Medical History:   Diagnosis Date    Acne     Seasonal depression ([facility] [address] )        Past Surgical History:   Procedure Laterality Date    COLONOSCOPY      EXAMINATION UNDER ANESTHESIA N/A 11/6/2018    Procedure: EXAM UNDER ANESTHESIA (EUA)  WITH IUD REMOVAL;  Surgeon: Erika Colon DO;  [facility];  Service: Gynecology    REMOVAL OF INTRAUTERINE DEVICE (IUD) N/A 11/6/2018    Procedure: REMOVAL OF INTRAUTERINE DEVICE (IUD); Surgeon: Erika Colon DO;  [facility];  Service: Gynecology    RHINOPLASTY  2013    WISDOM TOOTH EXTRACTION         Family History   Problem Relation Age of Onset    Colon cancer Maternal Grandmother     No Known Problems Mother     No Known Problems Father          Medications have been verified  Objective   /70   Pulse 76   Temp 99 1 °F (37 3 °C)   Resp 16   Ht 5' 5" (1 651 m)   Wt 63 5 kg (140 lb)   LMP 11/22/2018   SpO2 100%   BMI 23 30 kg/m²        Physical Exam     Physical Exam   Constitutional: She is oriented to person, place, and time  Vital signs are normal  She appears well-developed and well-nourished  She is active and cooperative  Non-toxic appearance  She does not have a sickly appearance  She does not appear ill   No distress  Musculoskeletal:   Right hand: there is diffuse swelling and bruising of the 4th digit  There is tenderness to palpation of the middle phalanx  Skin is appropriately warm and pink  Sensations intact  Good capillary refill  She has pain and difficulty w/ flexion of the digit but is able to extend without any difficulty  Neurological: She is alert and oriented to person, place, and time  Skin: Skin is warm, dry and intact  Bruising noted  She is not diaphoretic  No pallor  Psychiatric: She has a normal mood and affect  Her behavior is normal  Judgment and thought content normal    Nursing note and vitals reviewed

## 2018-12-08 NOTE — PROGRESS NOTES
Splint application  Date/Time: 12/8/2018 3:19 PM  Performed by: Eleni Dyson  Authorized by: Lidia Mejía     Consent:     Consent obtained:  Verbal and written    Consent given by:  Patient    Risks discussed:  Discoloration, numbness, pain and swelling  Pre-procedure details:     Sensation:  Normal  Procedure details:     Laterality:  Right    Location:  Finger    Finger:  R ring finger    Splint type:  Finger splint, static    Supplies:  Aluminum splint  Post-procedure details:     Pain:  Improved    Sensation:  Normal    Patient tolerance of procedure:   Tolerated well, no immediate complications

## 2018-12-13 ENCOUNTER — APPOINTMENT (OUTPATIENT)
Dept: RADIOLOGY | Facility: CLINIC | Age: 23
End: 2018-12-13
Payer: COMMERCIAL

## 2018-12-13 ENCOUNTER — OFFICE VISIT (OUTPATIENT)
Dept: OBGYN CLINIC | Facility: CLINIC | Age: 23
End: 2018-12-13
Payer: COMMERCIAL

## 2018-12-13 VITALS
SYSTOLIC BLOOD PRESSURE: 113 MMHG | HEART RATE: 62 BPM | HEIGHT: 65 IN | BODY MASS INDEX: 25.46 KG/M2 | DIASTOLIC BLOOD PRESSURE: 80 MMHG | WEIGHT: 152.8 LBS

## 2018-12-13 DIAGNOSIS — S62.654A CLOSED NONDISPLACED FRACTURE OF MIDDLE PHALANX OF RIGHT RING FINGER, INITIAL ENCOUNTER: Primary | ICD-10-CM

## 2018-12-13 DIAGNOSIS — S62.604A CLOSED NONDISPLACED FRACTURE OF PHALANX OF RIGHT RING FINGER, UNSPECIFIED PHALANX, INITIAL ENCOUNTER: ICD-10-CM

## 2018-12-13 PROCEDURE — 73140 X-RAY EXAM OF FINGER(S): CPT

## 2018-12-13 PROCEDURE — 99203 OFFICE O/P NEW LOW 30 MIN: CPT | Performed by: ORTHOPAEDIC SURGERY

## 2018-12-13 RX ORDER — CEFAZOLIN SODIUM 1 G/50ML
1000 SOLUTION INTRAVENOUS ONCE
Status: CANCELLED | OUTPATIENT
Start: 2018-12-19 | End: 2018-12-13

## 2018-12-13 NOTE — PROGRESS NOTES
Assessment/Plan:  1  Closed nondisplaced fracture of middle phalanx of right ring finger, initial encounter  XR finger right fourth digit-ring       Scribe Attestation    I,:   Jenny Berg MA am acting as a scribe while in the presence of the attending physician :        I,:   Joyceann Blizzard, DO personally performed the services described in this documentation    as scribed in my presence :              I discussed with Tarik Peterson today that her x-rays demonstrate a nondisplaced fracture of the middle phalanx of the right ring finger  Surgical treatment options were discussed with her today in the form of closed reduction with percutaneous pinning vs ORIF right ring finger as I do not feel as though this will heal well with conservative treatment options  She was agreeable to this  Risk of the surgery are inclusive of but not limited to bleeding, infection, nerve injury, blood clot, worsening of symptoms, not achieving the anticipated results, persistent stiffness, weakness and the need for additional surgery  The patient verbally stated they understood those risks and would like to proceed with the surgery  Surgical consent was signed in the office today  I will see her the day of surgery  Subjective:   Rosaura Pearson is a 21 y o  female who presents to the office today for evaluation of right ring finger injury  Patient states on 12/6/18 she fell off her horse and continued to hold on to the reins  She felt a sudden pain to her right ring finger  She presented to urgent care on 12/8/18 where x-rays were taken and she was placed in a splint and told to follow up with Orthopedics  She states she has been compliant with splint use  She notes mild tingling to the tip of the right ring finger  Review of Systems   Constitutional: Negative for chills and fever  HENT: Negative for drooling and sneezing  Eyes: Negative for redness  Respiratory: Negative for cough and wheezing      Gastrointestinal: Negative for nausea and vomiting  Musculoskeletal: Positive for arthralgias and joint swelling  Negative for myalgias  Neurological: Negative for weakness and numbness  Psychiatric/Behavioral: Negative for behavioral problems  The patient is not nervous/anxious  Past Medical History:   Diagnosis Date    Acne     Seasonal depression ([facility] [address] )        Past Surgical History:   Procedure Laterality Date    COLONOSCOPY      EXAMINATION UNDER ANESTHESIA N/A 11/6/2018    Procedure: EXAM UNDER ANESTHESIA (EUA)  WITH IUD REMOVAL;  Surgeon: Brennen Marion DO;  [facility];  Service: Gynecology    REMOVAL OF INTRAUTERINE DEVICE (IUD) N/A 11/6/2018    Procedure: REMOVAL OF INTRAUTERINE DEVICE (IUD); Surgeon: Brennen Marion DO;  [facility];  Service: Gynecology    RHINOPLASTY  2013    WISDOM TOOTH EXTRACTION         Family History   Problem Relation Age of Onset    Colon cancer Maternal Grandmother     No Known Problems Mother     No Known Problems Father        Social History     Occupational History    Not on file       Social History Main Topics    Smoking status: Never Smoker    Smokeless tobacco: Never Used    Alcohol use No    Drug use: No    Sexual activity: Yes         Current Outpatient Prescriptions:     ALPRAZolam (XANAX) 0 5 mg tablet, 4 (four) times a day as needed  , Disp: , Rfl:     Allergies   Allergen Reactions    Keo Flavor      anaphylaxis    Pineapple Anaphylaxis     Anaphylaxis    Zithromax [Azithromycin] Anaphylaxis    Latex Rash       Objective:  Vitals:    12/13/18 1005   BP: 113/80   Pulse: 62       Ortho Exam     Right Ring Finger     Sensation intact median, radial, and ulnar nerve  Brisk capillary refill   Compartments soft  No malrotation at rest however when patient makes a fist there is overlap over the small finger  Able to move MCP joint fully  MCP stable at 0 , 30 and 90 degrees  Mild tenderness over the RCL ring MCP joint but no laxity  Tender over p2 ring finger  No other tenderness of the hand  Motor intact ain/pin/m/u/r    Physical Exam   Constitutional: She is oriented to person, place, and time  She appears well-developed and well-nourished  HENT:   Head: Normocephalic and atraumatic  Eyes: Conjunctivae are normal  Right eye exhibits no discharge  Left eye exhibits no discharge  Neck: Normal range of motion  Neck supple  Cardiovascular: Normal rate and intact distal pulses  Pulmonary/Chest: Effort normal  No respiratory distress  Musculoskeletal:   As noted in HPI   Neurological: She is alert and oriented to person, place, and time  Skin: Skin is warm and dry  Psychiatric: She has a normal mood and affect   Her behavior is normal  Judgment and thought content normal        I have personally reviewed pertinent films in PACS and my interpretation is as follows:X-ray right ring finger performed in the office today demonstrates displaced fracture middle phalanx of the right ring finger

## 2018-12-17 ENCOUNTER — TRANSCRIBE ORDERS (OUTPATIENT)
Dept: ADMINISTRATIVE | Facility: HOSPITAL | Age: 23
End: 2018-12-17

## 2018-12-17 ENCOUNTER — OFFICE VISIT (OUTPATIENT)
Dept: FAMILY MEDICINE CLINIC | Facility: CLINIC | Age: 23
End: 2018-12-17
Payer: COMMERCIAL

## 2018-12-17 ENCOUNTER — APPOINTMENT (OUTPATIENT)
Dept: LAB | Facility: CLINIC | Age: 23
End: 2018-12-17
Payer: COMMERCIAL

## 2018-12-17 VITALS
HEART RATE: 72 BPM | SYSTOLIC BLOOD PRESSURE: 102 MMHG | OXYGEN SATURATION: 97 % | RESPIRATION RATE: 12 BRPM | BODY MASS INDEX: 24.96 KG/M2 | TEMPERATURE: 97.9 F | DIASTOLIC BLOOD PRESSURE: 72 MMHG | WEIGHT: 150 LBS

## 2018-12-17 DIAGNOSIS — Z01.818 PRE-OP EVALUATION: Primary | ICD-10-CM

## 2018-12-17 DIAGNOSIS — S62.654A CLOSED NONDISPLACED FRACTURE OF MIDDLE PHALANX OF RIGHT RING FINGER, INITIAL ENCOUNTER: ICD-10-CM

## 2018-12-17 LAB
B-HCG SERPL-ACNC: <2 MIU/ML
INR PPP: 0.99 (ref 0.86–1.17)
PLATELET # BLD AUTO: 278 THOUSANDS/UL (ref 149–390)
PMV BLD AUTO: 9.6 FL (ref 8.9–12.7)
PROTHROMBIN TIME: 13.2 SECONDS (ref 11.8–14.2)

## 2018-12-17 PROCEDURE — 85049 AUTOMATED PLATELET COUNT: CPT

## 2018-12-17 PROCEDURE — 36415 COLL VENOUS BLD VENIPUNCTURE: CPT

## 2018-12-17 PROCEDURE — 84702 CHORIONIC GONADOTROPIN TEST: CPT

## 2018-12-17 PROCEDURE — 99203 OFFICE O/P NEW LOW 30 MIN: CPT | Performed by: NURSE PRACTITIONER

## 2018-12-17 PROCEDURE — 85610 PROTHROMBIN TIME: CPT

## 2018-12-17 NOTE — PROGRESS NOTES
INTERNAL MEDICINE PRE-OPERATIVE EVALUATION  [facility]    NAME: Any Calzada  AGE: 21 y o  SEX: female  : 1995     DATE: 2018     Internal Medicine Pre-Operative Evaluation:     Chief Complaint: Pre-operative Evaluation     Surgery: ORIF right ring finger  Anticipated Date of Surgery: 18  Referring Provider: Self, Referral        History of Present Illness:     Any Calzada is a 21 y o  female who presents to the office today for a preoperative consultation at the request of surgeon, Dr Leti Meng, who plans on performing ORIF right ring finger on 18  Planned anesthesia is regional  Patient has a bleeding risk of: no recent abnormal bleeding and no remote history of abnormal bleeding  Patient does not have objections to receiving blood products if needed  Current anti-platelet/anti-coagulation medications that the patient is prescribed includes: none  Assessment of Chronic Conditions:   - anxiety     Assessment of Cardiac Risk:  · Denies unstable or severe angina or MI in the last 6 weeks or history of stent placement in the last year   · Denies decompensated heart failure (e g  New onset heart failure, NYHA functional class IV heart failure, or worsening existing heart failure)  · Denies significant arrhythmias such as high grade AV block, symptomatic ventricular arrhythmia, newly recognized ventricular tachycardia, supraventricular tachycardia with resting heart rate >100, or symptomatic bradycardia  · Denies severe heart valve disease including aortic stenosis or symptomatic mitral stenosis     Exercise Capacity:  · Able to walk 4 blocks without symptoms?: Yes  · Able to walk 2 flights without symptoms?: Yes    Prior Anesthesia Reactions: Yes     Personal history of venous thromboembolic disease? No    History of steroid use for >2 weeks within last year?  No          Review of Systems:     Review of Systems Constitutional: Negative for chills, diaphoresis, fatigue and fever  HENT: Negative for congestion, ear pain, sinus pain, sinus pressure and sore throat  Eyes: Negative for visual disturbance  Respiratory: Negative for cough, chest tightness, shortness of breath and wheezing  Cardiovascular: Negative for chest pain, palpitations and leg swelling  Gastrointestinal: Negative for abdominal distention, abdominal pain, diarrhea and nausea  Genitourinary: Negative for dysuria, frequency and urgency  Musculoskeletal: Negative for arthralgias, back pain and myalgias  Neurological: Negative for dizziness, weakness and headaches  Hematological: Negative for adenopathy  Psychiatric/Behavioral: Negative for agitation  The patient is not nervous/anxious  All other systems reviewed and are negative  Problem List:     Patient Active Problem List   Diagnosis    Encounter for removal of intrauterine contraceptive device    Anxiety state    Dysfunctional uterine bleeding    Knee pain    Acute pain of right knee    Mood swings    Popliteal bursitis    Right wrist pain    Sprain of posterior cruciate ligament of knee    Closed nondisplaced fracture of middle phalanx of right ring finger        Allergies:      Allergies   Allergen Reactions    Keo Flavor      anaphylaxis    Pineapple Anaphylaxis     Anaphylaxis    Zithromax [Azithromycin] Anaphylaxis    Latex Rash        Current Medications:       Current Outpatient Prescriptions:     ALPRAZolam (XANAX) 0 5 mg tablet, 4 (four) times a day as needed  , Disp: , Rfl:      Past History:     Past Medical History:   Diagnosis Date    Acne     Seasonal depression ([facility] [address] )         Past Surgical History:   Procedure Laterality Date    COLONOSCOPY      EXAMINATION UNDER ANESTHESIA N/A 11/6/2018    Procedure: EXAM UNDER ANESTHESIA (EUA)  WITH IUD REMOVAL;  Surgeon: Hany Kebede DO;  [facility];  Service: Gynecology    REMOVAL OF INTRAUTERINE DEVICE (IUD) N/A 11/6/2018    Procedure: REMOVAL OF INTRAUTERINE DEVICE (IUD); Surgeon: Gera Ridlye DO;  [facility];  Service: Gynecology    RHINOPLASTY  2013    WISDOM TOOTH EXTRACTION          Family History   Problem Relation Age of Onset    Colon cancer Maternal Grandmother     No Known Problems Mother     No Known Problems Father         Social History     Social History    Marital status: Single     Spouse name: N/A    Number of children: N/A    Years of education: N/A     Occupational History    Not on file  Social History Main Topics    Smoking status: Never Smoker    Smokeless tobacco: Never Used    Alcohol use No    Drug use: No    Sexual activity: Yes     Other Topics Concern    Not on file     Social History Narrative    No narrative on file        Physical Exam:      /72 (BP Location: Right arm)   Pulse 72   Temp 97 9 °F (36 6 °C) (Temporal)   Resp 12   Wt 68 kg (150 lb)   LMP 12/14/2018 (Exact Date)   SpO2 97%   BMI 24 96 kg/m²     Physical Exam   Constitutional: She is oriented to person, place, and time  She appears well-developed and well-nourished  HENT:   Head: Normocephalic and atraumatic  Mouth/Throat: Oropharynx is clear and moist    Eyes: Pupils are equal, round, and reactive to light  EOM are normal    Neck: Normal range of motion  Neck supple  No thyromegaly present  Cardiovascular: Normal rate, regular rhythm and normal heart sounds  No murmur heard  Pulmonary/Chest: Effort normal and breath sounds normal  No respiratory distress  She has no wheezes  She has no rales  Abdominal: Soft  Bowel sounds are normal  She exhibits no distension  There is no tenderness  Musculoskeletal:   Right ring finger splinted  Recent fx  Brisk cap refill  Palpable radial pulse  Sensation in tact  Lymphadenopathy:     She has no cervical adenopathy  Neurological: She is alert and oriented to person, place, and time     Skin: Skin is warm and dry  No pallor  Psychiatric: She has a normal mood and affect  Her behavior is normal  Thought content normal          Data:     Pre-operative work-up    Laboratory Results: I have personally reviewed the pertinent laboratory results/reports       Previous cardiopulmonary studies within the past year: none     Assessment:     1  Pre-op evaluation  Medically cleared for surgery     Plan:     21 y o  female with planned surgery: ORIF right ring finger 12/19/18     Cardiac Risk Estimation: per the Revised Cardiac Risk Index (Circ  100:[id number], 1999),  putting her in: RCI RISK CLASS I (0 risk factors, risk of major cardiac compl  appr  0 5%)  1  Further preoperative workup as follows:   - None; no further preoperative work-up is required    2  Medication Management/Recommendations:   - None, continue medication regimen including morning of surgery, with sip of water    3  Prophylaxis for cardiac events with perioperative beta-blockers: not indicated  4  Patient requires further consultation with: None    Clearance  Patient is CLEARED for surgery without any additional cardiac testing       Madhu Oconnor [place] [address]  [address] [address]  Phone#  [phone]  Fax#  [phone]

## 2018-12-18 ENCOUNTER — ANESTHESIA EVENT (OUTPATIENT)
Dept: PERIOP | Facility: HOSPITAL | Age: 23
End: 2018-12-18
Payer: COMMERCIAL

## 2018-12-18 RX ORDER — MULTIVITAMIN
1 TABLET ORAL DAILY
COMMUNITY
End: 2022-05-30

## 2018-12-18 NOTE — ANESTHESIA PREPROCEDURE EVALUATION
Review of Systems/Medical History  Patient summary reviewed  Chart reviewed  History of anesthetic complications (post op aggitation)     Cardiovascular   Pulmonary       GI/Hepatic            Endo/Other     GYN       Hematology   Musculoskeletal       Neurology   Psychology   Anxiety, Depression (seasonal depression) ,                   Anesthesia Plan  ASA Score- 2     Anesthesia Type- IV sedation with anesthesia and regional with ASA Monitors  Additional Monitors:   Airway Plan:     Comment: Right Axillary block with IV sedastion  Plan Factors-    Induction- intravenous  Postoperative Plan-     Informed Consent- Anesthetic plan and risks discussed with patient  I personally reviewed this patient with the CRNA  Discussed and agreed on the Anesthesia Plan with the CRNA  Giovana Norris

## 2018-12-18 NOTE — PRE-PROCEDURE INSTRUCTIONS
My Surgical Experience    The following information was developed to assist you to prepare for your operation  What do I need to do before coming to the hospital?   Arrange for a responsible person to drive you to and from the hospital    Arrange care for your children at home  Children are not allowed in the recovery areas of the hospital   Plan to wear clothing that is easy to put on and take off  If you are having shoulder surgery, wear a shirt that buttons or zippers in the front  Bathing  o Shower the evening before and the morning of your surgery with an antibacterial soap  Please refer to the Pre Op Showering Instructions for Surgery Patients Sheet   o Remove nail polish and all body piercing jewelry  o Do not shave any body part for at least 24 hours before surgery-this includes face, arms, legs and upper body  Food  o Nothing to eat or drink after midnight the night before your surgery  This includes candy and chewing gum  o Exception: If your surgery is after 12:00pm (noon), you may have clear liquids such as 7-Up®, ginger ale, apple or cranberry juice, Jell-O®, water, or clear broth until 8:00 am  o Do not drink milk or juice with pulp on the morning before surgery  o Do not drink alcohol 24 hours before surgery  Medicine  o Follow instructions you received from your surgeon about which medicines you may take on the day of surgery  o If instructed to take medicine on the morning of surgery, take pills with just a small sip of water  Call your prescribing doctor for specific infroamtion on what to do if you take insulin    What should I bring to the hospital?    Bring:  Piedad Apodaca or a walker, if you have them, for foot or knee surgery   A list of the daily medicines, vitamins, minerals, herbals and nutritional supplements you take   Include the dosages of medicines and the time you take them each day   Glasses, dentures or hearing aids   Minimal clothing; you will be wearing hospital sleepwear   Photo ID; required to verify your identity   If you have a Living Will or Power of , bring a copy of the documents   If you have an ostomy, bring an extra pouch and any supplies you use    Do not bring   Medicines or inhalers   Money, valuables or jewelry    What other information should I know about the day of surgery?  Notify your surgeons if you develop a cold, sore throat, cough, fever, rash or any other illness   Report to the Ambulatory Surgical/Same Day Surgery Unit   You will be instructed to stop at Registration only if you have not been pre-registered   Inform your  fi they do not stay that they will be asked by the staff to leave a phone number where they can be reached   Be available to be reached before surgery  In the event the operating room schedule changes, you may be asked to come in earlier or later than expected    *It is important to tell your doctor and others involved in your health care if you are taking or have been taking any non-prescription drugs, vitamins, minerals, herbals or other nutritional supplements  Any of these may interact with some food or medicines and cause a reaction      Pre-Surgery Instructions:   Medication Instructions    ALPRAZolam (XANAX) 0 5 mg tablet Instructed patient per Anesthesia Guidelines   Melatonin 1 MG CAPS Instructed patient per Anesthesia Guidelines   Multiple Vitamin (MULTIVITAMIN) tablet Instructed patient per Anesthesia Guidelines  May take xanax a m  Of surgery

## 2018-12-19 ENCOUNTER — ANESTHESIA (OUTPATIENT)
Dept: PERIOP | Facility: HOSPITAL | Age: 23
End: 2018-12-19
Payer: COMMERCIAL

## 2018-12-19 ENCOUNTER — HOSPITAL ENCOUNTER (OUTPATIENT)
Dept: RADIOLOGY | Facility: HOSPITAL | Age: 23
Setting detail: OUTPATIENT SURGERY
Discharge: HOME | End: 2018-12-19
Payer: COMMERCIAL

## 2018-12-19 ENCOUNTER — HOSPITAL ENCOUNTER (OUTPATIENT)
Facility: HOSPITAL | Age: 23
Setting detail: OUTPATIENT SURGERY
Discharge: HOME | End: 2018-12-19
Attending: ORTHOPAEDIC SURGERY | Admitting: ORTHOPAEDIC SURGERY
Payer: COMMERCIAL

## 2018-12-19 VITALS
TEMPERATURE: 96.7 F | RESPIRATION RATE: 18 BRPM | SYSTOLIC BLOOD PRESSURE: 101 MMHG | DIASTOLIC BLOOD PRESSURE: 64 MMHG | BODY MASS INDEX: 24.27 KG/M2 | WEIGHT: 145.69 LBS | OXYGEN SATURATION: 100 % | HEART RATE: 50 BPM | HEIGHT: 65 IN

## 2018-12-19 DIAGNOSIS — S62.654A: ICD-10-CM

## 2018-12-19 DIAGNOSIS — S62.654A CLOSED NONDISPLACED FRACTURE OF MIDDLE PHALANX OF RIGHT RING FINGER, INITIAL ENCOUNTER: Primary | ICD-10-CM

## 2018-12-19 LAB — EXT PREGNANCY TEST URINE: NEGATIVE

## 2018-12-19 PROCEDURE — 73140 X-RAY EXAM OF FINGER(S): CPT

## 2018-12-19 PROCEDURE — C1713 ANCHOR/SCREW BN/BN,TIS/BN: HCPCS | Performed by: ORTHOPAEDIC SURGERY

## 2018-12-19 PROCEDURE — 26727 TREAT FINGER FRACTURE EACH: CPT | Performed by: ORTHOPAEDIC SURGERY

## 2018-12-19 PROCEDURE — 81025 URINE PREGNANCY TEST: CPT | Performed by: ANESTHESIOLOGY

## 2018-12-19 DEVICE — K-WIRE .035 4 IN: Type: IMPLANTABLE DEVICE | Site: FINGER | Status: FUNCTIONAL

## 2018-12-19 RX ORDER — MAGNESIUM HYDROXIDE 1200 MG/15ML
LIQUID ORAL AS NEEDED
Status: DISCONTINUED | OUTPATIENT
Start: 2018-12-19 | End: 2018-12-19 | Stop reason: HOSPADM

## 2018-12-19 RX ORDER — PROPOFOL 10 MG/ML
INJECTION, EMULSION INTRAVENOUS CONTINUOUS PRN
Status: DISCONTINUED | OUTPATIENT
Start: 2018-12-19 | End: 2018-12-19 | Stop reason: SURG

## 2018-12-19 RX ORDER — FENTANYL CITRATE/PF 50 MCG/ML
25 SYRINGE (ML) INJECTION
Status: DISCONTINUED | OUTPATIENT
Start: 2018-12-19 | End: 2018-12-19 | Stop reason: HOSPADM

## 2018-12-19 RX ORDER — ROPIVACAINE HYDROCHLORIDE 5 MG/ML
INJECTION, SOLUTION EPIDURAL; INFILTRATION; PERINEURAL AS NEEDED
Status: DISCONTINUED | OUTPATIENT
Start: 2018-12-19 | End: 2018-12-19 | Stop reason: SURG

## 2018-12-19 RX ORDER — HYDROCODONE BITARTRATE AND ACETAMINOPHEN 5; 325 MG/1; MG/1
1 TABLET ORAL EVERY 4 HOURS PRN
Qty: 30 TABLET | Refills: 0 | Status: SHIPPED | OUTPATIENT
Start: 2018-12-19 | End: 2018-12-29

## 2018-12-19 RX ORDER — SODIUM CHLORIDE 9 MG/ML
75 INJECTION, SOLUTION INTRAVENOUS CONTINUOUS
Status: DISCONTINUED | OUTPATIENT
Start: 2018-12-19 | End: 2018-12-19 | Stop reason: HOSPADM

## 2018-12-19 RX ORDER — SODIUM CHLORIDE, SODIUM LACTATE, POTASSIUM CHLORIDE, CALCIUM CHLORIDE 600; 310; 30; 20 MG/100ML; MG/100ML; MG/100ML; MG/100ML
INJECTION, SOLUTION INTRAVENOUS CONTINUOUS PRN
Status: DISCONTINUED | OUTPATIENT
Start: 2018-12-19 | End: 2018-12-19 | Stop reason: SURG

## 2018-12-19 RX ORDER — ONDANSETRON 2 MG/ML
4 INJECTION INTRAMUSCULAR; INTRAVENOUS ONCE AS NEEDED
Status: DISCONTINUED | OUTPATIENT
Start: 2018-12-19 | End: 2018-12-19 | Stop reason: HOSPADM

## 2018-12-19 RX ORDER — ALBUTEROL SULFATE 2.5 MG/3ML
2.5 SOLUTION RESPIRATORY (INHALATION) ONCE AS NEEDED
Status: DISCONTINUED | OUTPATIENT
Start: 2018-12-19 | End: 2018-12-19 | Stop reason: HOSPADM

## 2018-12-19 RX ORDER — MEPERIDINE HYDROCHLORIDE 25 MG/ML
12.5 INJECTION INTRAMUSCULAR; INTRAVENOUS; SUBCUTANEOUS AS NEEDED
Status: COMPLETED | OUTPATIENT
Start: 2018-12-19 | End: 2018-12-19

## 2018-12-19 RX ORDER — HYDROMORPHONE HCL/PF 1 MG/ML
0.5 SYRINGE (ML) INJECTION
Status: DISCONTINUED | OUTPATIENT
Start: 2018-12-19 | End: 2018-12-19 | Stop reason: HOSPADM

## 2018-12-19 RX ORDER — MIDAZOLAM HYDROCHLORIDE 1 MG/ML
INJECTION INTRAMUSCULAR; INTRAVENOUS AS NEEDED
Status: DISCONTINUED | OUTPATIENT
Start: 2018-12-19 | End: 2018-12-19 | Stop reason: SURG

## 2018-12-19 RX ORDER — CEFAZOLIN SODIUM 1 G/50ML
1000 SOLUTION INTRAVENOUS ONCE
Status: COMPLETED | OUTPATIENT
Start: 2018-12-19 | End: 2018-12-19

## 2018-12-19 RX ADMIN — MIDAZOLAM HYDROCHLORIDE 2 MG: 1 INJECTION, SOLUTION INTRAMUSCULAR; INTRAVENOUS at 15:05

## 2018-12-19 RX ADMIN — MEPERIDINE HYDROCHLORIDE 12.5 MG: 25 INJECTION INTRAMUSCULAR; INTRAVENOUS; SUBCUTANEOUS at 16:16

## 2018-12-19 RX ADMIN — SODIUM CHLORIDE, SODIUM LACTATE, POTASSIUM CHLORIDE, AND CALCIUM CHLORIDE: .6; .31; .03; .02 INJECTION, SOLUTION INTRAVENOUS at 15:27

## 2018-12-19 RX ADMIN — MIDAZOLAM HYDROCHLORIDE 2 MG: 1 INJECTION, SOLUTION INTRAMUSCULAR; INTRAVENOUS at 13:41

## 2018-12-19 RX ADMIN — SODIUM CHLORIDE, SODIUM LACTATE, POTASSIUM CHLORIDE, AND CALCIUM CHLORIDE: .6; .31; .03; .02 INJECTION, SOLUTION INTRAVENOUS at 13:04

## 2018-12-19 RX ADMIN — PROPOFOL 100 MCG/KG/MIN: 10 INJECTION, EMULSION INTRAVENOUS at 15:07

## 2018-12-19 RX ADMIN — ROPIVACAINE HYDROCHLORIDE 30 ML: 5 INJECTION, SOLUTION EPIDURAL; INFILTRATION; PERINEURAL at 13:45

## 2018-12-19 RX ADMIN — CEFAZOLIN SODIUM 1000 MG: 1 SOLUTION INTRAVENOUS at 15:13

## 2018-12-19 NOTE — ANESTHESIA PROCEDURE NOTES
Peripheral Block    Patient location during procedure: holding area  Start time: 12/19/2018 1:30 PM  Reason for block: at surgeon's request and post-op pain management  Staffing  Anesthesiologist: Jody Gan  Performed: anesthesiologist   Preanesthetic Checklist  Completed: patient identified, site marked, surgical consent, pre-op evaluation, timeout performed, IV checked, risks and benefits discussed and monitors and equipment checked  Peripheral Block  Patient position: supine  Prep: ChloraPrep  Patient monitoring: continuous pulse ox, frequent blood pressure checks, cardiac monitor and heart rate  Block type: supraclavicular  Laterality: right  Injection technique: single-shot  Procedures: ultrasound guided  Ultrasound permanent image saved  Needle  Needle type: Stimuplex   Needle gauge: 22 G  Needle length: 10 cm  Needle localization: ultrasound guidance  Needle insertion depth: 2 cm  Assessment  Injection assessment: incremental injection, local visualized surrounding nerve on ultrasound, negative aspiration for heme, no paresthesia on injection and negative aspiration for CSF  Paresthesia pain: none  Heart rate change: no  Slow fractionated injection: yes  Post-procedure:  site cleaned  patient tolerated the procedure well with no immediate complications

## 2018-12-19 NOTE — ANESTHESIA POSTPROCEDURE EVALUATION
Post-Op Assessment Note      CV Status:  Stable    Mental Status:  Alert and awake    Hydration Status:  Euvolemic    PONV Controlled:  Controlled    Airway Patency:  Patent    Post Op Vitals Reviewed: Yes          Staff: Anesthesiologist, CRNA           /85 (12/19/18 1558)    Temp     Pulse 86 (12/19/18 1558)   Resp 20 (12/19/18 1558)    SpO2 100 % (12/19/18 1558)

## 2018-12-19 NOTE — PERIOPERATIVE NURSING NOTE
Patient received from PACU via stretcher  Awake and alert  Tolerating PO fluids  Family at bedside  Patient denies any pain at this time  Dressing to right arm and hand dry and intact  Exposed fingers to right hand warm to touch with good capillary refill noted  right arm is in a sling

## 2018-12-19 NOTE — DISCHARGE INSTRUCTIONS
Dr Jett Dias Operative Instructions  Right finger percutaneous pinning    You have had surgery on your arm today, please read and follow the information below:  · Elevate your hand above your elbow during the next 24-48 hours to help with swelling  · Place your hand and arm over your head with motion at your shoulder three times a day  · Do not apply any cream/ointment/oil to your incisions including antibiotics  · Do not soak your hands in standing water (dishwater, tubs, Jacuzzi's, pools, etc ) until given permission (typically 2-3 weeks after injury)    Call the office if you notice any:  · Increased numbness or tingling of your hand or fingers that is not relieved with elevation  · Increasing pain that is not controlled with medication  · Difficulty chewing, breathing, swallowing  · Chest pains or shortness of breath  · Fever over 101 4 degrees  Bandage: Do NOT remove bandage until follow-up appointment  Motion: DO NOT move your fingers until given permission in follow-up  Weight bearing status: The operated extremity should be non-weight bearing until further notice  Ice: No ice    Sling: No sling necessary  Pain medication:   Naproxen 220 mg two times a day   Norco/Hydrocodone one tab every 6 hours AS NEEDED for pain     Follow-up Appointment: 7-10 days  Please call the office at [phone] if you have any questions or concerns regarding your post-operative care

## 2018-12-20 NOTE — OP NOTE
OPERATIVE REPORT  PATIENT NAME: Nina Odonnell    :  1995  MRN: [id number]  Pt Location: [facility] ROOM 02    SURGERY DATE: 2018    Surgeon(s) and Role:     * Frarah Mitchell, DO - Primary    Preop Diagnosis:  Closed nondisplaced fracture of middle phalanx of right ring finger, initial encounter [J92 [id number]]    Post-Op Diagnosis Codes:     * Closed nondisplaced fracture of middle phalanx of right ring finger, initial encounter [[id number] [id number]]    Procedure(s) (LRB):  CLOSED REDUCTION PERCUTANEOUS PINNING P2 RIGHT RING FINGER (Right)    Specimen(s):  * No specimens in log *    Estimated Blood Loss:   Minimal    Drains:       Anesthesia Type:   Regional with Sedation    Operative Indications:  Closed nondisplaced fracture of middle phalanx of right ring finger, initial encounter [[id number]]      Operative Findings:  Oblique type fracture of P2 the right ring finger    Complications:   None    Procedure and Technique:  Connie Krishnan is a 55-year-old female who was riding a horse when the horse jerked causing pain in the patient's right ring finger  Patient was seen in [place] urgent care and diagnosed with oblique fracture P2 of the right ring finger she was then seen in my office in the options were discussed for treatment  Patient did have some malrotation during physical exam   Patient elected to undergo surgical fixation of the fracture to correct rotation  Consent forms were obtained  Risks of the surgery explained to the patient risks including but not limited to nerve vessel damage, bleeding, infection, nonunion, malunion, pin failure, hardware failure, need for future surgery, not reaching defect results  The day of surgery patient was identified by 1st and last name  The right ring finger was marked  Patient with the anesthesia team they deemed regional block with sedation was most appropriate  Consent forms were obtained      Patient was transferred from the preop area the OR and again identified by 1st and last name  She underwent sedation with propofol  She had nasal cannula was placed  Tourniquet was placed on the brachium  His well-padded  Patient then underwent sterile prep and drape  Time-out was performed successfully  Antibiotics had been given  Consent forms were reviewed by myself  Correct site being the right ring finger  X-ray was used prior to starting the procedure in order to evaluate the fracture and confirmed fracture site  Orthogonal views were taken right ring finger which confirmed P2 fracture  Reduction maneuver was performed correcting the malrotation of the finger  A small pointed reduction clamp was then used percutaneously to hold the fracture fragments in place  Orthogonal views with the percutaneous clamp were reviewed which demonstrated anatomic reduction  At this 0 035 K-wires were placed from ulnar to radial direction holding the reduction  They were placed under fluoroscopic guidance  Orthogonal views taken of the pins demonstrated anatomic reduction of the fracture as well as stable hardware placement  3 035 K-wires were placed  Patient tolerated procedure well  The pins were bent and cut and pin caps were placed  Patient had brisk capillary refill after the pins were placed  Pin sites were dressed with sterile dressing including Adaptic and 4x4s and the pin sites were padded  Patient was placed in a ulnar gutter type splint  Patient tolerated procedure well  She was awakened from sedation and transferred from the OR to the PACU in stable condition     I was present for the entire procedure, A qualified resident physician was not available and A physician assistant was required during the procedure for retraction tissue handling,, reduction, and manipulation    Patient Disposition:  PACU  and hemodynamically stable    SIGNATURE: Leila Cardoza DO  DATE: December 19, 2018  TIME: 8:55 PM

## 2019-01-03 ENCOUNTER — APPOINTMENT (OUTPATIENT)
Dept: RADIOLOGY | Facility: CLINIC | Age: 24
End: 2019-01-03
Payer: COMMERCIAL

## 2019-01-03 ENCOUNTER — OFFICE VISIT (OUTPATIENT)
Dept: OBGYN CLINIC | Facility: CLINIC | Age: 24
End: 2019-01-03

## 2019-01-03 DIAGNOSIS — S62.654D CLOSED NONDISPLACED FRACTURE OF MIDDLE PHALANX OF RIGHT RING FINGER WITH ROUTINE HEALING, SUBSEQUENT ENCOUNTER: Primary | ICD-10-CM

## 2019-01-03 DIAGNOSIS — S62.654D CLOSED NONDISPLACED FRACTURE OF MIDDLE PHALANX OF RIGHT RING FINGER WITH ROUTINE HEALING, SUBSEQUENT ENCOUNTER: ICD-10-CM

## 2019-01-03 PROCEDURE — 73140 X-RAY EXAM OF FINGER(S): CPT

## 2019-01-03 PROCEDURE — 99024 POSTOP FOLLOW-UP VISIT: CPT | Performed by: ORTHOPAEDIC SURGERY

## 2019-01-15 ENCOUNTER — APPOINTMENT (OUTPATIENT)
Dept: RADIOLOGY | Facility: CLINIC | Age: 24
End: 2019-01-15
Payer: COMMERCIAL

## 2019-01-15 ENCOUNTER — OFFICE VISIT (OUTPATIENT)
Dept: OBGYN CLINIC | Facility: CLINIC | Age: 24
End: 2019-01-15

## 2019-01-15 VITALS
HEART RATE: 67 BPM | SYSTOLIC BLOOD PRESSURE: 122 MMHG | DIASTOLIC BLOOD PRESSURE: 77 MMHG | HEIGHT: 65 IN | WEIGHT: 145 LBS | BODY MASS INDEX: 24.16 KG/M2

## 2019-01-15 DIAGNOSIS — S62.654D CLOSED NONDISPLACED FRACTURE OF MIDDLE PHALANX OF RIGHT RING FINGER WITH ROUTINE HEALING, SUBSEQUENT ENCOUNTER: Primary | ICD-10-CM

## 2019-01-15 DIAGNOSIS — S62.654D CLOSED NONDISPLACED FRACTURE OF MIDDLE PHALANX OF RIGHT RING FINGER WITH ROUTINE HEALING, SUBSEQUENT ENCOUNTER: ICD-10-CM

## 2019-01-15 PROCEDURE — 73140 X-RAY EXAM OF FINGER(S): CPT

## 2019-01-15 PROCEDURE — 99024 POSTOP FOLLOW-UP VISIT: CPT | Performed by: ORTHOPAEDIC SURGERY

## 2019-01-15 RX ORDER — DOXYCYCLINE 100 MG/1
100 TABLET ORAL 2 TIMES DAILY
Qty: 20 TABLET | Refills: 0 | Status: SHIPPED | OUTPATIENT
Start: 2019-01-15 | End: 2019-01-25

## 2019-01-15 NOTE — PROGRESS NOTES
Assessment/Plan:  1  Closed nondisplaced fracture of middle phalanx of right ring finger with routine healing, subsequent encounter  XR finger right fourth digit-ring       Scribe Attestation    I,:   Jenny Berg MA am acting as a scribe while in the presence of the attending physician :        I,:   Nando Salinas DO personally performed the services described in this documentation    as scribed in my presence :              Lamonte Bishop is doing well  She is nontender at the fracture site  She does have a small amount of yellow drainage from the proximal pin site on examination today  A prescription was provided for anti-biotics and she was instructed to use peroxide 2x's a day at the pin sites and dressing changes twice a day  No signs of osteomyelitis on the x-ray  I believe she has a early soft tissue infection of the pin site  The pins were pulled in the office today without any complications  She was instructed to keep her hand clean and dry  She is to remain nonweightbearing with the RUE  She mat discontinue brace use at this time as long as the wound is covered  She will follow up in 1 week for repeat evaluation and wound check  We will hold off on x-rays depending on her exam       Subjective:   Chantal Sandoval is a 21 y o  female who presents to the office 4 weeks s/p CRPP right ring finger performed on 12/19/18  She states she has been compliant with brace use  She notes increased pain to the right ring finger  She states she has been keeping this clean but has still been working on the farm around animals  She denies any numbness or tingling  Review of Systems   Constitutional: Negative for chills and fever  HENT: Negative for drooling and sneezing  Eyes: Negative for redness  Respiratory: Negative for cough and wheezing  Gastrointestinal: Negative for nausea and vomiting  Musculoskeletal: Positive for arthralgias  Negative for joint swelling and myalgias     Neurological: Negative for weakness and numbness  Psychiatric/Behavioral: Negative for behavioral problems  The patient is not nervous/anxious  Past Medical History:   Diagnosis Date    Acne     Anxiety     Post-op pain     pt wakes up very agitated from general anesthesia       Past Surgical History:   Procedure Laterality Date    COLONOSCOPY      EXAMINATION UNDER ANESTHESIA N/A 11/6/2018    Procedure: EXAM UNDER ANESTHESIA (EUA)  WITH IUD REMOVAL;  Surgeon: Kings Castro DO;  [facility];  Service: Gynecology    [place] OPEN [address] PROX/MIDDLE EA Right 12/19/2018    Procedure: CLOSED REDUCTION PERCUTANEOUS PINNING P2 RIGHT RING FINGER;  Surgeon: Jenelle Thompson DO;  Location: [facility];  Service: Orthopedics    REMOVAL OF INTRAUTERINE DEVICE (IUD) N/A 11/6/2018    Procedure: REMOVAL OF INTRAUTERINE DEVICE (IUD); Surgeon: Kings Castro DO;  [facility];  Service: Gynecology    RHINOPLASTY  2013    WISDOM TOOTH EXTRACTION         Family History   Problem Relation Age of Onset    Colon cancer Maternal Grandmother     No Known Problems Mother     No Known Problems Father        Social History     Occupational History    Not on file       Social History Main Topics    Smoking status: Never Smoker    Smokeless tobacco: Never Used    Alcohol use Yes      Comment: occ    Drug use: No    Sexual activity: Yes         Current Outpatient Prescriptions:     ALPRAZolam (XANAX) 0 5 mg tablet, 4 (four) times a day as needed  , Disp: , Rfl:     Melatonin 1 MG CAPS, Take 1 mg by mouth, Disp: , Rfl:     Multiple Vitamin (MULTIVITAMIN) tablet, Take 1 tablet by mouth daily, Disp: , Rfl:     Allergies   Allergen Reactions    [place] Flavor      anaphylaxis    Pineapple Anaphylaxis     Anaphylaxis    Zithromax [Azithromycin] Anaphylaxis    Latex Rash       Objective:  Vitals:    01/15/19 1006   BP: 122/77   Pulse: 67       Ortho Exam     Right ring finger    NTTP fracture site  Proximal pin site small amount of yellow drainage  FDP FDS extensors intact   Sensation/motor intact median, radial, and ulnar nerve  Compartments soft  Brisk capillary refill   No malrotation  No edema about the finger  Small amount of erythema around the pin sites which is expected for pin sites  Pin removal did not produce much pain    Physical Exam   Constitutional: She is oriented to person, place, and time  She appears well-developed and well-nourished  HENT:   Head: Normocephalic and atraumatic  Eyes: Conjunctivae are normal  Right eye exhibits no discharge  Left eye exhibits no discharge  Neck: Normal range of motion  Neck supple  Cardiovascular: Normal rate and intact distal pulses  Pulmonary/Chest: Effort normal  No respiratory distress  Neurological: She is alert and oriented to person, place, and time  Skin: Skin is warm and dry  Psychiatric: She has a normal mood and affect  Her behavior is normal  Judgment and thought content normal        I have personally reviewed pertinent films in PACS and my interpretation is as follows:X-ray right ring finger performed in the office today demonstrates orthopedic hardware intact and healing fracture line    No signs of ostia

## 2019-01-24 ENCOUNTER — OFFICE VISIT (OUTPATIENT)
Dept: OBGYN CLINIC | Facility: CLINIC | Age: 24
End: 2019-01-24

## 2019-01-24 ENCOUNTER — APPOINTMENT (OUTPATIENT)
Dept: RADIOLOGY | Facility: CLINIC | Age: 24
End: 2019-01-24
Payer: COMMERCIAL

## 2019-01-24 VITALS
SYSTOLIC BLOOD PRESSURE: 117 MMHG | BODY MASS INDEX: 22.49 KG/M2 | WEIGHT: 135 LBS | DIASTOLIC BLOOD PRESSURE: 74 MMHG | HEIGHT: 65 IN | HEART RATE: 64 BPM

## 2019-01-24 DIAGNOSIS — S62.654D CLOSED NONDISPLACED FRACTURE OF MIDDLE PHALANX OF RIGHT RING FINGER WITH ROUTINE HEALING, SUBSEQUENT ENCOUNTER: ICD-10-CM

## 2019-01-24 DIAGNOSIS — S62.654D CLOSED NONDISPLACED FRACTURE OF MIDDLE PHALANX OF RIGHT RING FINGER WITH ROUTINE HEALING, SUBSEQUENT ENCOUNTER: Primary | ICD-10-CM

## 2019-01-24 PROCEDURE — 73140 X-RAY EXAM OF FINGER(S): CPT

## 2019-01-24 PROCEDURE — 99024 POSTOP FOLLOW-UP VISIT: CPT | Performed by: ORTHOPAEDIC SURGERY

## 2019-01-24 NOTE — PROGRESS NOTES
Assessment/Plan:  1  Closed nondisplaced fracture of middle phalanx of right ring finger with routine healing, subsequent encounter  XR finger right fourth digit-ring     Yvonne did well with the antibiotics, peroxide, and pin removal   The pin sites are well-healed  The fingers nontender  There is no erythema  There is no swelling  She will finish her antibiotics  I will see her back in 2 weeks or sooner if there is a problem  At this point she will be of very light weight-bearing  This was explained to her as a fork at [facility] her toothbrush  I do not know on her to be full weight-bearing as the fracture still healing  She can be range of motion as tolerated  It is difficult for her to make it to therapy so explained some exercises for her to do  She has full motion at this time however she does have some swelling in the digit  I believe this was related to the initial injury as she had some swelling ongoing  She will continue to move her finger to get the swelling out  X-rays will be obtained in 2 weeks of just the finger  Patient understood the plan  Subjective:  Postop    Patient ID: Rosaura Pearson is a 21 y o  female  HPI  Tarik Peterson presents today approximately 4 weeks after closed reduction percutaneous pinning right ring finger P2 fracture  At last visit she had some pin site drainage which was treated with pin site cleaning, pin removal and antibiotics  She was doing twice a day peroxide soaks as well as on doxycycline  Today she reports is much improved  There is no pain at this time  She also claims the numbness tingling and resolved in the finger  She has been doing some motion exercises at home  She does complain of some swelling in the finger  Review of Systems   Constitutional: Negative for chills, fever and unexpected weight change  HENT: Negative for hearing loss, nosebleeds and sore throat  Eyes: Negative for pain, redness and visual disturbance     Respiratory: Negative for cough, shortness of breath and wheezing  Cardiovascular: Negative for chest pain, palpitations and leg swelling  Gastrointestinal: Negative for abdominal pain, nausea and vomiting  Endocrine: Negative for polydipsia and polyuria  Genitourinary: Negative for dysuria and hematuria  Musculoskeletal:        See HPI   Skin: Negative for rash and wound  Neurological: Negative for dizziness, numbness and headaches  Psychiatric/Behavioral: Negative for decreased concentration and suicidal ideas  The patient is not nervous/anxious  Past Medical History:   Diagnosis Date    Acne     Anxiety     Post-op pain     pt wakes up very agitated from general anesthesia       Past Surgical History:   Procedure Laterality Date    COLONOSCOPY      EXAMINATION UNDER ANESTHESIA N/A 11/6/2018    Procedure: EXAM UNDER ANESTHESIA (EUA)  WITH IUD REMOVAL;  Surgeon: Marilu Gerber DO;  [facility];  Service: Gynecology    [place] OPEN [address] [place] [address] PROX/MIDDLE EA Right 12/19/2018    Procedure: CLOSED REDUCTION PERCUTANEOUS PINNING P2 RIGHT RING FINGER;  Surgeon: Cecily Samuel DO;  Location: [address] [place] [address];  Service: Orthopedics    REMOVAL OF INTRAUTERINE DEVICE (IUD) N/A 11/6/2018    Procedure: REMOVAL OF INTRAUTERINE DEVICE (IUD);   Surgeon: Marilu Gerber DO;  [facility];  Service: Gynecology    RHINOPLASTY  2013    WISDOM TOOTH EXTRACTION         Family History   Problem Relation Age of Onset    Colon cancer Maternal Grandmother     No Known Problems Mother     No Known Problems Father     No Known Problems Sister     No Known Problems Brother     No Known Problems Maternal Aunt     No Known Problems Maternal Uncle     No Known Problems Paternal Aunt     No Known Problems Paternal Uncle     No Known Problems Maternal Grandfather     No Known Problems Paternal Grandmother     No Known Problems Paternal Grandfather     ADD / ADHD Neg Hx     Anesthesia problems Neg Hx     Cancer Neg Hx     Clotting disorder Neg Hx     Collagen disease Neg Hx     Diabetes Neg Hx     Dislocations Neg Hx     Learning disabilities Neg Hx     Neurological problems Neg Hx     Osteoporosis Neg Hx     Rheumatologic disease Neg Hx     Scoliosis Neg Hx     Vascular Disease Neg Hx        Social History     Occupational History    Not on file  Social History Main Topics    Smoking status: Never Smoker    Smokeless tobacco: Never Used    Alcohol use Yes      Comment: occ    Drug use: No    Sexual activity: Yes         Current Outpatient Prescriptions:     ALPRAZolam (XANAX) 0 5 mg tablet, 4 (four) times a day as needed  , Disp: , Rfl:     doxycycline (ADOXA) 100 MG tablet, Take 1 tablet (100 mg total) by mouth 2 (two) times a day for 10 days, Disp: 20 tablet, Rfl: 0    Melatonin 1 MG CAPS, Take 1 mg by mouth, Disp: , Rfl:     Multiple Vitamin (MULTIVITAMIN) tablet, Take 1 tablet by mouth daily, Disp: , Rfl:     Allergies   Allergen Reactions    [place] Flavor      anaphylaxis    Pineapple Anaphylaxis     Anaphylaxis    Zithromax [Azithromycin] Anaphylaxis    Latex Rash       Objective:  Vitals:    01/24/19 1006   BP: 117/74   Pulse: 64       Body mass index is 22 47 kg/m²  Ortho Exam    Right ring finger  Pin sites are well healed  Nontender  No drainage  No erythema  There is mild swelling about the P2  Sensation intact  Compartment soft  FDS FDP extensors are intact  Patient has full range of motion of the digits  Full composite fist  No laxity at the DIP or PIP joints      Physical Exam   Constitutional: She is oriented to person, place, and time  She appears well-developed and well-nourished  HENT:   Head: Normocephalic and atraumatic  Eyes: Pupils are equal, round, and reactive to light  Conjunctivae are normal    Neck: Normal range of motion  Neck supple  Cardiovascular: Normal rate and intact distal pulses      Pulmonary/Chest: Effort normal  No respiratory distress  Musculoskeletal:   As noted in HPI   Neurological: She is alert and oriented to person, place, and time  Skin: Skin is warm and dry  Psychiatric: She has a normal mood and affect  Her behavior is normal    Vitals reviewed  I have personally reviewed pertinent films in PACS  X-rays demonstrate healing fracture

## 2019-02-07 ENCOUNTER — APPOINTMENT (OUTPATIENT)
Dept: RADIOLOGY | Facility: CLINIC | Age: 24
End: 2019-02-07
Payer: COMMERCIAL

## 2019-02-07 ENCOUNTER — OFFICE VISIT (OUTPATIENT)
Dept: OBGYN CLINIC | Facility: CLINIC | Age: 24
End: 2019-02-07

## 2019-02-07 VITALS
WEIGHT: 159.6 LBS | BODY MASS INDEX: 26.59 KG/M2 | HEART RATE: 71 BPM | SYSTOLIC BLOOD PRESSURE: 103 MMHG | DIASTOLIC BLOOD PRESSURE: 65 MMHG | HEIGHT: 65 IN

## 2019-02-07 DIAGNOSIS — S62.654D CLOSED NONDISPLACED FRACTURE OF MIDDLE PHALANX OF RIGHT RING FINGER WITH ROUTINE HEALING, SUBSEQUENT ENCOUNTER: Primary | ICD-10-CM

## 2019-02-07 DIAGNOSIS — S62.654D CLOSED NONDISPLACED FRACTURE OF MIDDLE PHALANX OF RIGHT RING FINGER WITH ROUTINE HEALING, SUBSEQUENT ENCOUNTER: ICD-10-CM

## 2019-02-07 PROCEDURE — 99024 POSTOP FOLLOW-UP VISIT: CPT | Performed by: ORTHOPAEDIC SURGERY

## 2019-02-07 PROCEDURE — 73140 X-RAY EXAM OF FINGER(S): CPT

## 2019-02-07 NOTE — PROGRESS NOTES
Assessment/Plan:  1  Closed nondisplaced fracture of middle phalanx of right ring finger with routine healing, subsequent encounter  XR finger right fourth digit-ring     Swapna Boards is doing well postop  She has full range of motion  She is nontender at the fracture site  Has been approximately 2 months since the surgery  I will not give her any restrictions at this time  Over the next 1-2 weeks she can slowly wean back into activity  If she notices any issues with pain or discomfort about the finger she should stop  At the 2 week stevan from now she should be fairly comfortable with all activities  She is not any stiffness  She denies any therapy  Any problems she will call me  Subjective:  Postop    Patient ID: Toño Mccall is a 21 y o  female  HPI  Christy Belcher is here postop status post closed reduction of percutaneous pinning of her right ring finger  She is doing extremely well  She has full range of motion  She has no tenderness with use of the hand  The numbness has resolved  She is not taking anything for pain  She has not noticed any fevers or chills or redness or swelling about the finger  Review of Systems   Constitutional: Negative for chills, fever and unexpected weight change  HENT: Negative for hearing loss, nosebleeds and sore throat  Eyes: Negative for pain, redness and visual disturbance  Respiratory: Negative for cough, shortness of breath and wheezing  Cardiovascular: Negative for chest pain, palpitations and leg swelling  Gastrointestinal: Negative for abdominal pain, nausea and vomiting  Endocrine: Negative for polydipsia and polyuria  Genitourinary: Negative for dysuria and hematuria  Musculoskeletal:        See HPI   Skin: Negative for rash and wound  Neurological: Negative for dizziness, numbness and headaches  Psychiatric/Behavioral: Negative for decreased concentration and suicidal ideas  The patient is not nervous/anxious            Past Medical History:   Diagnosis Date    Acne     Anxiety     Post-op pain     pt wakes up very agitated from general anesthesia       Past Surgical History:   Procedure Laterality Date    COLONOSCOPY      EXAMINATION UNDER ANESTHESIA N/A 11/6/2018    Procedure: EXAM UNDER ANESTHESIA (EUA)  WITH IUD REMOVAL;  Surgeon: Hany Kebede DO;  Location:  MAIN OR;  Service: Gynecology    OR OPEN [address] PROX/MIDDLE EA Right 12/19/2018    Procedure: CLOSED REDUCTION PERCUTANEOUS PINNING P2 RIGHT RING FINGER;  Surgeon: Jonas Santos DO;  Location: [address] [place] [address];  Service: Orthopedics    REMOVAL OF INTRAUTERINE DEVICE (IUD) N/A 11/6/2018    Procedure: REMOVAL OF INTRAUTERINE DEVICE (IUD); Surgeon: Hany Kebede DO;  Location: [facility];  Service: Gynecology    RHINOPLASTY  2013    WISDOM TOOTH EXTRACTION         Family History   Problem Relation Age of Onset    Colon cancer Maternal Grandmother     No Known Problems Mother     No Known Problems Father     No Known Problems Sister     No Known Problems Brother     No Known Problems Maternal Aunt     No Known Problems Maternal Uncle     No Known Problems Paternal Aunt     No Known Problems Paternal Uncle     No Known Problems Maternal Grandfather     No Known Problems Paternal Grandmother     No Known Problems Paternal Grandfather     ADD / ADHD Neg Hx     Anesthesia problems Neg Hx     Cancer Neg Hx     Clotting disorder Neg Hx     Collagen disease Neg Hx     Diabetes Neg Hx     Dislocations Neg Hx     Learning disabilities Neg Hx     Neurological problems Neg Hx     Osteoporosis Neg Hx     Rheumatologic disease Neg Hx     Scoliosis Neg Hx     Vascular Disease Neg Hx        Social History     Occupational History    Not on file       Social History Main Topics    Smoking status: Never Smoker    Smokeless tobacco: Never Used    Alcohol use Yes      Comment: occ    Drug use: No    Sexual activity: Yes         Current Outpatient Prescriptions:     ALPRAZolam (XANAX) 0 5 mg tablet, 4 (four) times a day as needed  , Disp: , Rfl:     Melatonin 1 MG CAPS, Take 1 mg by mouth, Disp: , Rfl:     Multiple Vitamin (MULTIVITAMIN) tablet, Take 1 tablet by mouth daily, Disp: , Rfl:     Allergies   Allergen Reactions    Keo Flavor      anaphylaxis    Pineapple Anaphylaxis     Anaphylaxis    Zithromax [Azithromycin] Anaphylaxis    Latex Rash       Objective:  Vitals:    02/07/19 1201   BP: 103/65   Pulse: 71       Body mass index is 26 56 kg/m²  Ortho Exam     Right ring finger  Full range of motion  FDS/FDP/extensors intact  Brisk cap refill  Compartment soft  Nontender at the fracture site  Nail intact  Sensation intact over radial ulnar sides  Motor and sensory intact median ulnar radial nerves  No skin lesions  No drainage      Physical Exam   Constitutional: She is oriented to person, place, and time  She appears well-developed and well-nourished  HENT:   Head: Normocephalic and atraumatic  Eyes: Pupils are equal, round, and reactive to light  Conjunctivae are normal    Neck: Normal range of motion  Neck supple  Cardiovascular: Normal rate and intact distal pulses  Pulmonary/Chest: Effort normal  No respiratory distress  Musculoskeletal:   As noted in HPI   Neurological: She is alert and oriented to person, place, and time  Skin: Skin is warm and dry  Psychiatric: She has a normal mood and affect  Her behavior is normal    Vitals reviewed  I have personally reviewed pertinent films in PACS  X-rays obtained today demonstrate healed fracture of the P2 the right ring finger    There is normal alignment of the finger P1 P2 and P3

## 2019-02-27 ENCOUNTER — OFFICE VISIT (OUTPATIENT)
Dept: OBGYN CLINIC | Facility: CLINIC | Age: 24
End: 2019-02-27
Payer: COMMERCIAL

## 2019-02-27 VITALS
SYSTOLIC BLOOD PRESSURE: 120 MMHG | HEIGHT: 65 IN | WEIGHT: 161.2 LBS | DIASTOLIC BLOOD PRESSURE: 82 MMHG | BODY MASS INDEX: 26.86 KG/M2

## 2019-02-27 DIAGNOSIS — N92.6 IRREGULAR MENSES: Primary | ICD-10-CM

## 2019-02-27 PROCEDURE — 99214 OFFICE O/P EST MOD 30 MIN: CPT | Performed by: OBSTETRICS & GYNECOLOGY

## 2019-02-27 RX ORDER — CYANOCOBALAMIN (VITAMIN B-12) 500 MCG
LOZENGE ORAL
COMMUNITY
End: 2022-05-30

## 2019-02-27 NOTE — PROGRESS NOTES
Assessment/Plan:     Patient will keep menstrual diary over the next several months  Will obtain day 3 FSH level and day 21 progesterone level  I also offered to semen analysis  She will discuss this with her partner  She would also like to see if she has any infertility health insurance  Return to office in May for annual visit as well as follow-up menses     Diagnoses and all orders for this visit:    Irregular menses  -     Follicle stimulating hormone; Future  -     Progesterone; Future    Other orders  -     Vitamin E 400 units TABS; Take by mouth          Subjective:     Patient ID: Nicole Womack is a 21 y o  female  HPI     This is a 72-year-old female  (SAB x2) presents complaining of irregular menses  She had her Mirena IUD removed 2018  Her menstrual diary included -, spotting , 1/8 x4 days, 2/2 x6 days, she is sexually active and has been in a monogamous relationship for 4 years  They have not been using any form of contraception  She is taking a multivitamin now  She has been on Xanax in the past and has not used it in the last 4 months  She is concerned about infertility  We discussed removal of the Mirena IUD and establishing cycle control  In the past she has been on OCPs at age 13, followed by Depo-Provera for couple of years  This has resulted in irregular menses  Then she had the Mirena IUD inserted 2017  With the IUD she has been essentially amenorrheic  Review of Systems   Constitutional: Negative for fatigue, fever and unexpected weight change  Respiratory: Negative for cough, chest tightness, shortness of breath and wheezing  Cardiovascular: Negative  Negative for chest pain and palpitations  Gastrointestinal: Negative  Negative for abdominal distention, abdominal pain, blood in stool, constipation, diarrhea, nausea and vomiting  Genitourinary: Positive for menstrual problem   Negative for difficulty urinating, dyspareunia, dysuria, flank pain, frequency, genital sores, hematuria, pelvic pain, urgency, vaginal bleeding, vaginal discharge and vaginal pain  Skin: Negative for rash           Objective:     Physical Exam

## 2019-03-04 ENCOUNTER — TELEPHONE (OUTPATIENT)
Dept: OBGYN CLINIC | Facility: CLINIC | Age: 24
End: 2019-03-04

## 2019-03-04 NOTE — TELEPHONE ENCOUNTER
Pt had (+) HPT 3/2/19 - was seen in ER same night for abd pain & bleeding  LMP 2/1/19 - pt states had intercourse only on 2/17/19 since LMP  Had [facility] = 2 in ER  Having lighter flow bleeding compared to normal menses  Will recall next wk to f/u

## 2019-05-14 ENCOUNTER — ANNUAL EXAM (OUTPATIENT)
Dept: OBGYN CLINIC | Facility: CLINIC | Age: 24
End: 2019-05-14
Payer: COMMERCIAL

## 2019-05-14 VITALS
BODY MASS INDEX: 26.36 KG/M2 | SYSTOLIC BLOOD PRESSURE: 124 MMHG | DIASTOLIC BLOOD PRESSURE: 90 MMHG | WEIGHT: 158.2 LBS | HEIGHT: 65 IN

## 2019-05-14 DIAGNOSIS — Z01.419 ENCOUNTER FOR ANNUAL ROUTINE GYNECOLOGICAL EXAMINATION: Primary | ICD-10-CM

## 2019-05-14 DIAGNOSIS — N92.6 IRREGULAR MENSES: ICD-10-CM

## 2019-05-14 PROCEDURE — S0612 ANNUAL GYNECOLOGICAL EXAMINA: HCPCS | Performed by: OBSTETRICS & GYNECOLOGY

## 2019-05-15 DIAGNOSIS — N88.2 CERVICAL STENOSIS (UTERINE CERVIX): Primary | ICD-10-CM

## 2019-05-15 RX ORDER — MISOPROSTOL 200 UG/1
200 TABLET ORAL ONCE
Qty: 3 TABLET | Refills: 0 | Status: SHIPPED | OUTPATIENT
Start: 2019-05-15 | End: 2019-05-15

## 2019-05-28 ENCOUNTER — PROCEDURE VISIT (OUTPATIENT)
Dept: OBGYN CLINIC | Facility: CLINIC | Age: 24
End: 2019-05-28
Payer: COMMERCIAL

## 2019-05-28 VITALS
SYSTOLIC BLOOD PRESSURE: 122 MMHG | BODY MASS INDEX: 25.43 KG/M2 | DIASTOLIC BLOOD PRESSURE: 84 MMHG | WEIGHT: 152.6 LBS | HEIGHT: 65 IN

## 2019-05-28 DIAGNOSIS — Z30.430 ENCOUNTER FOR INSERTION OF MIRENA IUD: Primary | ICD-10-CM

## 2019-05-28 DIAGNOSIS — N92.6 MISSED PERIOD: ICD-10-CM

## 2019-05-28 DIAGNOSIS — Z11.3 SCREENING EXAMINATION FOR STD (SEXUALLY TRANSMITTED DISEASE): ICD-10-CM

## 2019-05-28 LAB — SL AMB POCT URINE HCG: NEGATIVE

## 2019-05-28 PROCEDURE — 58300 INSERT INTRAUTERINE DEVICE: CPT | Performed by: OBSTETRICS & GYNECOLOGY

## 2019-05-28 PROCEDURE — 81025 URINE PREGNANCY TEST: CPT | Performed by: OBSTETRICS & GYNECOLOGY

## 2019-05-29 LAB
C TRACH RRNA SPEC QL NAA+PROBE: NOT DETECTED
N GONORRHOEA RRNA SPEC QL NAA+PROBE: NOT DETECTED
T VAGINALIS RRNA SPEC QL NAA+PROBE: NOT DETECTED

## 2019-09-17 ENCOUNTER — TELEPHONE (OUTPATIENT)
Dept: FAMILY MEDICINE CLINIC | Facility: CLINIC | Age: 24
End: 2019-09-17

## 2020-01-28 NOTE — TELEPHONE ENCOUNTER
01/28/20 5:31 PM     Thank you for your request  Your request has been received, reviewed, and the patient chart updated  The PCP has successfully been removed with a patient attribution note  This message will now be completed      Thank you  Dedra Momin

## 2022-01-05 ENCOUNTER — HOSPITAL ENCOUNTER (OUTPATIENT)
Dept: ULTRASOUND IMAGING | Facility: HOSPITAL | Age: 27
Discharge: HOME | End: 2022-01-05
Attending: OBSTETRICS & GYNECOLOGY
Payer: COMMERCIAL

## 2022-01-05 ENCOUNTER — ANNUAL EXAM (OUTPATIENT)
Dept: OBGYN CLINIC | Facility: CLINIC | Age: 27
End: 2022-01-05
Payer: COMMERCIAL

## 2022-01-05 VITALS
WEIGHT: 142 LBS | SYSTOLIC BLOOD PRESSURE: 112 MMHG | BODY MASS INDEX: 23.66 KG/M2 | DIASTOLIC BLOOD PRESSURE: 70 MMHG | HEIGHT: 65 IN

## 2022-01-05 DIAGNOSIS — N83.202 CYST OF LEFT OVARY: ICD-10-CM

## 2022-01-05 DIAGNOSIS — Z01.419 ENCOUNTER FOR ANNUAL ROUTINE GYNECOLOGICAL EXAMINATION: Primary | ICD-10-CM

## 2022-01-05 PROCEDURE — 76856 US EXAM PELVIC COMPLETE: CPT

## 2022-01-05 PROCEDURE — 76830 TRANSVAGINAL US NON-OB: CPT

## 2022-01-05 PROCEDURE — 99395 PREV VISIT EST AGE 18-39: CPT | Performed by: OBSTETRICS & GYNECOLOGY

## 2022-01-05 RX ORDER — DEXTROAMPHETAMINE SACCHARATE, AMPHETAMINE ASPARTATE, DEXTROAMPHETAMINE SULFATE AND AMPHETAMINE SULFATE 5; 5; 5; 5 MG/1; MG/1; MG/1; MG/1
TABLET ORAL
COMMUNITY
Start: 2021-12-29 | End: 2022-05-30

## 2022-01-05 NOTE — PROGRESS NOTES
Assessment/Plan:  Pap smear done as well as annual   Encouraged self-breast examination as well as calcium supplementation  Recommend pelvic ultrasound rule out left ovarian cyst   She is requesting to have IUD removed  Discussed other forms of contraception  She would use condoms and then the where interested in future pregnancies, once  diabetes is stable  She will return to office for IUD removal   All questions answered  No problem-specific Assessment & Plan notes found for this encounter  Diagnoses and all orders for this visit:    Encounter for annual routine gynecological examination  -     Thinprep Tis and HPV mRNA E6/E7    Cyst of left ovary  -     US pelvis complete w transvaginal; Future    Other orders  -     amphetamine-dextroamphetamine (ADDERALL, 20MG,) 20 mg tablet; Adderall 20 mg tablet  -     levonorgestrel (MIRENA) 20 MCG/24HR IUD; 1 each by Intrauterine route once          Subjective:      Patient ID: Ember Soto is a 32 y o  female  HPI     This is a pleasant 68-year-old female  (AB x2) presents for gyn exam   She was last seen 2019 for Mirena IUD insertion  She has been amenorrheic since then  She is sexually active and has been in a monogamous relationship for over 3 years,  1 year  She denies any changes in bowel or bladder function  Her Pap smears have been normal   She did receive the HPV vaccine in   Both her and her  were diagnosed with COVID 10/2021  Her  has been a diabetic for approximately 2 years  He was hospitalized, ICU for approximately 1 week in [facility]    He is now scheduled to see an endocrinologist       The following portions of the patient's history were reviewed and updated as appropriate: allergies, current medications, past family history, past medical history, past social history, past surgical history and problem list     Review of Systems   Constitutional: Negative for fatigue, fever and unexpected weight change  Respiratory: Negative for cough, chest tightness, shortness of breath and wheezing  Cardiovascular: Negative  Negative for chest pain and palpitations  Gastrointestinal: Negative  Negative for abdominal distention, abdominal pain, blood in stool, constipation, diarrhea, nausea and vomiting  Genitourinary: Negative  Negative for difficulty urinating, dyspareunia, dysuria, flank pain, frequency, genital sores, hematuria, pelvic pain, urgency, vaginal bleeding, vaginal discharge and vaginal pain  Skin: Negative for rash  Objective:      /70   Ht 5' 5" (1 651 m)   Wt 64 4 kg (142 lb)   BMI 23 63 kg/m²          Physical Exam  Constitutional:       Appearance: Normal appearance  She is well-developed  Cardiovascular:      Rate and Rhythm: Normal rate and regular rhythm  Pulmonary:      Effort: Pulmonary effort is normal       Breath sounds: Normal breath sounds  Chest:   Breasts:      Right: No inverted nipple, mass, nipple discharge, skin change or tenderness  Left: No inverted nipple, mass, nipple discharge, skin change or tenderness  Abdominal:      General: Bowel sounds are normal  There is no distension  Palpations: Abdomen is soft  Tenderness: There is no abdominal tenderness  There is no guarding or rebound  Genitourinary:     Labia:         Right: No rash, tenderness or lesion  Left: No rash, tenderness or lesion  Vagina: Normal  No signs of injury  No vaginal discharge or tenderness  Cervix: No cervical motion tenderness, discharge, friability, lesion, erythema or cervical bleeding  Uterus: Not enlarged and not tender  Adnexa:         Right: No mass, tenderness or fullness  Left: Fullness present  No mass or tenderness  Comments: Cervix is small  The IUD string is visualized within the endocervical canal   Neurological:      Mental Status: She is alert and oriented to person, place, and time

## 2022-01-07 LAB
CLINICAL INFO: NORMAL
CYTO CVX: NORMAL
CYTOLOGY CMNT CVX/VAG CYTO-IMP: NORMAL
DATE PREVIOUS BX: NORMAL
HPV E6+E7 MRNA CVX QL NAA+PROBE: NOT DETECTED
LMP START DATE: NORMAL
MICROORGANISM CVX/VAG CYTO: NORMAL
SL AMB PREV. PAP:: NORMAL
SPECIMEN SOURCE CVX/VAG CYTO: NORMAL

## 2022-01-10 ENCOUNTER — TELEPHONE (OUTPATIENT)
Dept: OBGYN CLINIC | Facility: CLINIC | Age: 27
End: 2022-01-10

## 2022-01-10 NOTE — TELEPHONE ENCOUNTER
----- Message from Jean-Pierre Sanez DO sent at 1/9/2022  7:19 PM EST -----  Inform pt pap normal but +candida, rec monistat 3

## 2022-01-25 ENCOUNTER — PROCEDURE VISIT (OUTPATIENT)
Dept: OBGYN CLINIC | Facility: CLINIC | Age: 27
End: 2022-01-25
Payer: COMMERCIAL

## 2022-01-25 VITALS
WEIGHT: 140 LBS | BODY MASS INDEX: 23.32 KG/M2 | HEIGHT: 65 IN | SYSTOLIC BLOOD PRESSURE: 112 MMHG | DIASTOLIC BLOOD PRESSURE: 72 MMHG

## 2022-01-25 DIAGNOSIS — Z30.432 ENCOUNTER FOR IUD REMOVAL: Primary | ICD-10-CM

## 2022-01-25 DIAGNOSIS — N83.202 LEFT OVARIAN CYST: ICD-10-CM

## 2022-01-25 PROCEDURE — 58301 REMOVE INTRAUTERINE DEVICE: CPT | Performed by: OBSTETRICS & GYNECOLOGY

## 2022-01-25 RX ORDER — ALPRAZOLAM 0.25 MG/1
TABLET ORAL
COMMUNITY
Start: 2022-01-14 | End: 2022-05-31

## 2022-01-25 NOTE — PROGRESS NOTES
Procedures  Patient requesting to have the Mirena IUD removed  All questions answered  Next the cervix was visualized  The IUD string was not visualized  Upon probing endocervix IUD string was encountered  IUD was then removed without difficulty  All instruments were then removed from the vagina  Patient tolerated the procedure well  Recent pelvic ultrasound had revealed, confirmed ovarian cyst   Discussed having repeat ultrasound 2-3 months for follow-up  All questions answered  She may be interested in pregnancy later this year and will start prenatal vitamin

## 2022-03-09 ENCOUNTER — TELEPHONE (OUTPATIENT)
Dept: OBGYN CLINIC | Facility: CLINIC | Age: 27
End: 2022-03-09

## 2022-03-09 NOTE — TELEPHONE ENCOUNTER
Return call - lm pt's as - pt had called & lm as re: had C-T scan Jacobo Martinez) 3/8/2022 to r/o bowel obstruction - has (L) corpus luteal cyst - prev had U/S 1/5/2022 showing complex probable collapsing (L) corpus luteal cyst (1 5 cm) & was to have f/u U/S in 2-3 months

## 2022-03-11 NOTE — TELEPHONE ENCOUNTER
Recalled pt - states she was told (L) ovarian cyst enlarged compared to prev C-T scan - report states (L) small corpus luteal cyst   recom for pt to have repeat U/S as prev recom f/u U/S 1/5/2022  Pt will schedule appt time for same

## 2022-05-02 ENCOUNTER — TELEPHONE (OUTPATIENT)
Dept: OBGYN CLINIC | Facility: CLINIC | Age: 27
End: 2022-05-02

## 2022-05-02 NOTE — TELEPHONE ENCOUNTER
----- Message from Garfield Quick sent at 5/2/2022 10:11 AM EDT -----  Regarding: Doctor recommendation   Okay and just to be sure, I cant come to Dr Supa Swan for the first scan?

## 2022-05-02 NOTE — TELEPHONE ENCOUNTER
Referred pt to [facility] for pregnancy care but she is inquiring if can have U/S done here first (see prev e-mail message)

## 2022-05-04 ENCOUNTER — TELEPHONE (OUTPATIENT)
Dept: OBGYN CLINIC | Facility: CLINIC | Age: 27
End: 2022-05-04

## 2022-05-04 DIAGNOSIS — Z3A.01 LESS THAN 8 WEEKS GESTATION OF PREGNANCY: Primary | ICD-10-CM

## 2022-05-04 NOTE — TELEPHONE ENCOUNTER
Pt called in to schedule initial apt, LMP 4/1  Patient h/o 2 losses  Will complete labs at [facility] in [facility], labs faxed  Patient apt scheduled for 5/31

## 2022-05-16 ENCOUNTER — TELEPHONE (OUTPATIENT)
Dept: OBGYN CLINIC | Facility: CLINIC | Age: 27
End: 2022-05-16

## 2022-05-16 NOTE — TELEPHONE ENCOUNTER
Patient called in and reviewed her labs  Aware good rise, will keep apt as scheduled  Having trouble keep PNV down   Reviewed time of day, food, and if still with issues can try flinstones until gets through 1st trimester

## 2022-05-17 ENCOUNTER — TELEPHONE (OUTPATIENT)
Dept: OBGYN CLINIC | Facility: CLINIC | Age: 27
End: 2022-05-17

## 2022-05-17 DIAGNOSIS — O21.9 NAUSEA/VOMITING IN PREGNANCY: Primary | ICD-10-CM

## 2022-05-17 RX ORDER — METOCLOPRAMIDE 10 MG/1
10 TABLET ORAL 4 TIMES DAILY PRN
Qty: 60 TABLET | Refills: 0 | Status: SHIPPED | OUTPATIENT
Start: 2022-05-17 | End: 2022-05-31

## 2022-05-17 NOTE — TELEPHONE ENCOUNTER
Spoke with patient, is early pregnant  having issues with constipation and nausea and vomiting  has tried the conservative methods we have asked her to try for the n/v without success  she has a hard time keeping food down or being interested in food  she also occasionally vomits up the PNV  Looking for Rx alternative to help  Aware can review with MD to see if we can we try phenergan for her to see if helps and she can eat  States she is able to hydrate so i asked her to add in electrolytes and colace  Reviewed with Dr Ranjit George, can offer Reglan and colace  Can do phenergan but tends to make patients sleepy  Pt prefers to try reglan  Will also pickup colace and hydration with electrolytes at the store  Aware to call if not seeing improvement

## 2022-05-19 ENCOUNTER — HOSPITAL ENCOUNTER (EMERGENCY)
Facility: HOSPITAL | Age: 27
Discharge: HOME | End: 2022-05-19
Attending: EMERGENCY MEDICINE
Payer: COMMERCIAL

## 2022-05-19 ENCOUNTER — APPOINTMENT (EMERGENCY)
Dept: ULTRASOUND IMAGING | Facility: HOSPITAL | Age: 27
End: 2022-05-19
Payer: COMMERCIAL

## 2022-05-19 ENCOUNTER — TELEPHONE (OUTPATIENT)
Dept: OBGYN CLINIC | Facility: CLINIC | Age: 27
End: 2022-05-19

## 2022-05-19 VITALS
OXYGEN SATURATION: 99 % | HEART RATE: 85 BPM | SYSTOLIC BLOOD PRESSURE: 100 MMHG | TEMPERATURE: 98.7 F | RESPIRATION RATE: 16 BRPM | DIASTOLIC BLOOD PRESSURE: 53 MMHG

## 2022-05-19 DIAGNOSIS — O21.9 NAUSEA AND VOMITING IN PREGNANCY: Primary | ICD-10-CM

## 2022-05-19 DIAGNOSIS — O21.9 NAUSEA AND VOMITING DURING PREGNANCY: ICD-10-CM

## 2022-05-19 DIAGNOSIS — R11.2 NAUSEA AND VOMITING, UNSPECIFIED VOMITING TYPE: Primary | ICD-10-CM

## 2022-05-19 DIAGNOSIS — R82.71 BACTERIA IN URINE: ICD-10-CM

## 2022-05-19 LAB
ALBUMIN SERPL BCP-MCNC: 4.1 G/DL (ref 3.5–5)
ALP SERPL-CCNC: 38 U/L (ref 34–104)
ALT SERPL W P-5'-P-CCNC: 5 U/L (ref 7–52)
ANION GAP SERPL CALCULATED.3IONS-SCNC: 5 MMOL/L (ref 4–13)
AST SERPL W P-5'-P-CCNC: 13 U/L (ref 13–39)
B-HCG SERPL-ACNC: ABNORMAL MIU/ML (ref 0–11.6)
BACTERIA UR QL AUTO: ABNORMAL /HPF
BASOPHILS # BLD AUTO: 0.03 THOUSANDS/ΜL (ref 0–0.1)
BASOPHILS NFR BLD AUTO: 0 % (ref 0–1)
BILIRUB SERPL-MCNC: 0.6 MG/DL (ref 0.2–1)
BILIRUB UR QL STRIP: NEGATIVE
BUN SERPL-MCNC: 10 MG/DL (ref 5–25)
CALCIUM SERPL-MCNC: 9 MG/DL (ref 8.4–10.2)
CHLORIDE SERPL-SCNC: 107 MMOL/L (ref 96–108)
CLARITY UR: ABNORMAL
CO2 SERPL-SCNC: 26 MMOL/L (ref 21–32)
COLOR UR: YELLOW
CREAT SERPL-MCNC: 0.64 MG/DL (ref 0.6–1.3)
EOSINOPHIL # BLD AUTO: 0.03 THOUSAND/ΜL (ref 0–0.61)
EOSINOPHIL NFR BLD AUTO: 0 % (ref 0–6)
ERYTHROCYTE [DISTWIDTH] IN BLOOD BY AUTOMATED COUNT: 11.6 % (ref 11.6–15.1)
GFR SERPL CREATININE-BSD FRML MDRD: 123 ML/MIN/1.73SQ M
GLUCOSE SERPL-MCNC: 73 MG/DL (ref 65–140)
GLUCOSE UR STRIP-MCNC: NEGATIVE MG/DL
HCT VFR BLD AUTO: 38.1 % (ref 34.8–46.1)
HGB BLD-MCNC: 13 G/DL (ref 11.5–15.4)
HGB UR QL STRIP.AUTO: NEGATIVE
IMM GRANULOCYTES # BLD AUTO: 0.04 THOUSAND/UL (ref 0–0.2)
IMM GRANULOCYTES NFR BLD AUTO: 1 % (ref 0–2)
KETONES UR STRIP-MCNC: NEGATIVE MG/DL
LEUKOCYTE ESTERASE UR QL STRIP: ABNORMAL
LYMPHOCYTES # BLD AUTO: 1.41 THOUSANDS/ΜL (ref 0.6–4.47)
LYMPHOCYTES NFR BLD AUTO: 18 % (ref 14–44)
MCH RBC QN AUTO: 31 PG (ref 26.8–34.3)
MCHC RBC AUTO-ENTMCNC: 34.1 G/DL (ref 31.4–37.4)
MCV RBC AUTO: 91 FL (ref 82–98)
MONOCYTES # BLD AUTO: 0.6 THOUSAND/ΜL (ref 0.17–1.22)
MONOCYTES NFR BLD AUTO: 8 % (ref 4–12)
NEUTROPHILS # BLD AUTO: 5.54 THOUSANDS/ΜL (ref 1.85–7.62)
NEUTS SEG NFR BLD AUTO: 73 % (ref 43–75)
NITRITE UR QL STRIP: NEGATIVE
NON-SQ EPI CELLS URNS QL MICRO: ABNORMAL /HPF
NRBC BLD AUTO-RTO: 0 /100 WBCS
PH UR STRIP.AUTO: 7 [PH]
PLATELET # BLD AUTO: 294 THOUSANDS/UL (ref 149–390)
PMV BLD AUTO: 8.9 FL (ref 8.9–12.7)
POTASSIUM SERPL-SCNC: 3.7 MMOL/L (ref 3.5–5.3)
PROT SERPL-MCNC: 6.6 G/DL (ref 6.4–8.4)
PROT UR STRIP-MCNC: NEGATIVE MG/DL
RBC # BLD AUTO: 4.2 MILLION/UL (ref 3.81–5.12)
RBC #/AREA URNS AUTO: ABNORMAL /HPF
SODIUM SERPL-SCNC: 138 MMOL/L (ref 135–147)
SP GR UR STRIP.AUTO: 1.02 (ref 1–1.03)
UROBILINOGEN UR QL STRIP.AUTO: 0.2 E.U./DL
WBC # BLD AUTO: 7.65 THOUSAND/UL (ref 4.31–10.16)
WBC #/AREA URNS AUTO: ABNORMAL /HPF

## 2022-05-19 PROCEDURE — 85025 COMPLETE CBC W/AUTO DIFF WBC: CPT | Performed by: FAMILY MEDICINE

## 2022-05-19 PROCEDURE — 80053 COMPREHEN METABOLIC PANEL: CPT | Performed by: FAMILY MEDICINE

## 2022-05-19 PROCEDURE — 96361 HYDRATE IV INFUSION ADD-ON: CPT

## 2022-05-19 PROCEDURE — 81001 URINALYSIS AUTO W/SCOPE: CPT | Performed by: FAMILY MEDICINE

## 2022-05-19 PROCEDURE — 84702 CHORIONIC GONADOTROPIN TEST: CPT | Performed by: FAMILY MEDICINE

## 2022-05-19 PROCEDURE — 87086 URINE CULTURE/COLONY COUNT: CPT | Performed by: FAMILY MEDICINE

## 2022-05-19 PROCEDURE — 99284 EMERGENCY DEPT VISIT MOD MDM: CPT | Performed by: EMERGENCY MEDICINE

## 2022-05-19 PROCEDURE — 36415 COLL VENOUS BLD VENIPUNCTURE: CPT | Performed by: FAMILY MEDICINE

## 2022-05-19 PROCEDURE — 99284 EMERGENCY DEPT VISIT MOD MDM: CPT

## 2022-05-19 PROCEDURE — 76801 OB US < 14 WKS SINGLE FETUS: CPT

## 2022-05-19 PROCEDURE — 96374 THER/PROPH/DIAG INJ IV PUSH: CPT

## 2022-05-19 PROCEDURE — 96375 TX/PRO/DX INJ NEW DRUG ADDON: CPT

## 2022-05-19 RX ORDER — METOCLOPRAMIDE HYDROCHLORIDE 5 MG/ML
10 INJECTION INTRAMUSCULAR; INTRAVENOUS ONCE
Status: COMPLETED | OUTPATIENT
Start: 2022-05-19 | End: 2022-05-19

## 2022-05-19 RX ORDER — DIPHENHYDRAMINE HYDROCHLORIDE 50 MG/ML
25 INJECTION INTRAMUSCULAR; INTRAVENOUS ONCE
Status: COMPLETED | OUTPATIENT
Start: 2022-05-19 | End: 2022-05-19

## 2022-05-19 RX ORDER — NITROFURANTOIN 25; 75 MG/1; MG/1
100 CAPSULE ORAL 2 TIMES DAILY
Qty: 14 CAPSULE | Refills: 0 | Status: SHIPPED | OUTPATIENT
Start: 2022-05-19 | End: 2022-05-26

## 2022-05-19 RX ADMIN — METOCLOPRAMIDE HYDROCHLORIDE 10 MG: 5 INJECTION INTRAMUSCULAR; INTRAVENOUS at 12:39

## 2022-05-19 RX ADMIN — DIPHENHYDRAMINE HYDROCHLORIDE 25 MG: 50 INJECTION, SOLUTION INTRAMUSCULAR; INTRAVENOUS at 13:04

## 2022-05-19 RX ADMIN — SODIUM CHLORIDE 1000 ML: 0.9 INJECTION, SOLUTION INTRAVENOUS at 12:44

## 2022-05-19 NOTE — TELEPHONE ENCOUNTER
Pt called in regarding nausea and vomiting  Had spoke with her the other day and started on reglan  Patient states unable to keep anything down and is vomiting bile at this point  Patient feeling fatigued and dizzy  Aware to ER for evaluation, ADT21 entered and MD made aware

## 2022-05-19 NOTE — ED PROVIDER NOTES
History  Chief Complaint   Patient presents with    Vomiting During Pregnancy     Patient is 7 weeks pregnant and unable to keep anything down including water states just vomiting up stomach acid  Not peeing very often      HPI    Shubham Oliveros is a 33 yo  who is at [facility] (by LMP 3/29/22; she believes conception was 22) who presents with nausea/vomiting in pregnancy  Patient reports she was in her normal state of health until the past 2 weeks developed nausea and vomiting in pregnancy  She was prescribed Reglan to help with symptoms which have been useful  She says yesterday she was able to have several nails without any symptoms  Symptoms that brought her in today began last night  Felt very nauseous took 10 mg of Reglan before bed  In the morning, she started having nausea and could not take anything down orally  She tried drinking water and eating saltine crackers, but she vomited both  Most recently she did have some bilious emesis  Has occasional abdominal cramping but no abdominal pain  She tried taking Reglan but vomited it up immediately afterward  She does have some lightheadedness when trying to stand up  She says she is urinating less, but this is compared to urinating every hour at the beginning of pregnancy  She is otherwise urianting okay without any dysuria  Denies fevers, chills, chest pain, shortness a breath, diarrhea  Denies cough, cold, congestion  Denies any known sick contacts  She does have constipation which is a chronic problem  First 2 of her prior pregnancies were spontaneous abortions in the 1st trimester, most recent miscarriage occurred  which was an ectopic had happened with IUD  She does not have an IUD currently  Current pregnancy is intended and wanted  Blood pressure was initially elevated on presentation, however, patient reports this is not typically an issue for her  She reports usually her blood pressure is too low      She denies being on any other medications at this time  While she has medications listed in her chart, the only medications she reports taking are reglan  Denies tobacco use marijuana use or alcohol use  Prior to Admission Medications   Prescriptions Last Dose Informant Patient Reported? Taking? ALPRAZolam (XANAX) 0 25 mg tablet   Yes No   ALPRAZolam (XANAX) 0 5 mg tablet  Self Yes No   Si (four) times a day as needed     Patient not taking: Reported on 2022    Melatonin 1 MG CAPS   Yes No   Sig: Take 1 mg by mouth   Patient not taking: Reported on 2022    Multiple Vitamin (MULTIVITAMIN) tablet   Yes No   Sig: Take 1 tablet by mouth daily   Patient not taking: Reported on 2022    Vitamin E 400 units TABS   Yes No   Sig: Take by mouth   Patient not taking: Reported on 2022    amphetamine-dextroamphetamine (ADDERALL, 20MG,) 20 mg tablet   Yes No   Sig: Adderall 20 mg tablet   levonorgestrel (MIRENA) 20 MCG/24HR IUD   Yes No   Si each by Intrauterine route once   metoclopramide (Reglan) 10 mg tablet   No No   Sig: Take 1 tablet (10 mg total) by mouth as needed in the morning and 1 tablet (10 mg total) as needed at noon and 1 tablet (10 mg total) as needed in the evening and 1 tablet (10 mg total) as needed before bedtime (nausea and vomiting)        Facility-Administered Medications: None       Past Medical History:   Diagnosis Date    Acne     Anxiety     Carpal tunnel syndrome     Post-op pain     pt wakes up very agitated from general anesthesia       Past Surgical History:   Procedure Laterality Date    COLONOSCOPY      EXAMINATION UNDER ANESTHESIA N/A 2018    Procedure: EXAM UNDER ANESTHESIA (EUA)  WITH IUD REMOVAL;  Surgeon: Taz Solomon DO;  [facility];  Service: Gynecology    NM OPEN TX PHALANGEAL SHAFT FRACTURE PROX/MIDDLE EA Right 2018    Procedure: CLOSED REDUCTION PERCUTANEOUS PINNING P2 RIGHT RING FINGER;  Surgeon: William Richmond DO;  Location: [address] [place] [address]; Service: Orthopedics    REMOVAL OF INTRAUTERINE DEVICE (IUD) N/A 11/6/2018    Procedure: REMOVAL OF INTRAUTERINE DEVICE (IUD); Surgeon: Taz Solomon DO;  [facility];  Service: Gynecology    RHINOPLASTY  2013    WISDOM TOOTH EXTRACTION         Family History   Problem Relation Age of Onset    Colon cancer Maternal Grandmother     No Known Problems Mother     No Known Problems Father     No Known Problems Sister     No Known Problems Brother     No Known Problems Maternal Aunt     No Known Problems Maternal Uncle     No Known Problems Paternal Aunt     No Known Problems Paternal Uncle     No Known Problems Maternal Grandfather     No Known Problems Paternal Grandmother     No Known Problems Paternal Grandfather     ADD / ADHD Neg Hx     Anesthesia problems Neg Hx     Cancer Neg Hx     Clotting disorder Neg Hx     Collagen disease Neg Hx     Diabetes Neg Hx     Dislocations Neg Hx     Learning disabilities Neg Hx     Neurological problems Neg Hx     Osteoporosis Neg Hx     Rheumatologic disease Neg Hx     Scoliosis Neg Hx     Vascular Disease Neg Hx      I have reviewed and agree with the history as documented  E-Cigarette/Vaping    E-Cigarette Use Never User      E-Cigarette/Vaping Substances    Nicotine No     THC No     CBD No     Flavoring No     Other No      Social History     Tobacco Use    Smoking status: Never Smoker    Smokeless tobacco: Never Used   Vaping Use    Vaping Use: Never used   Substance Use Topics    Alcohol use: Yes     Comment: occ    Drug use: No        Review of Systems   Constitutional: Negative for fatigue and fever  HENT: Negative for rhinorrhea and sore throat  Eyes: Negative for pain and visual disturbance  Respiratory: Negative for cough and shortness of breath  Cardiovascular: Negative for chest pain  Gastrointestinal: Positive for constipation, nausea and vomiting   Negative for abdominal pain, blood in stool and diarrhea  Endocrine: Negative for polydipsia and polyuria  Genitourinary: Negative for dysuria, hematuria and vaginal bleeding  Denies vaginal bleeding, leakage of fluid, contractions  Musculoskeletal: Negative for gait problem  Skin: Negative for rash and wound  Neurological: Positive for light-headedness and headaches (Chronic)  Negative for weakness  Physical Exam  ED Triage Vitals [05/19/22 1141]   Temperature Pulse Respirations Blood Pressure SpO2   98 7 °F (37 1 °C) 95 18 (!) 183/70 99 %      Temp Source Heart Rate Source Patient Position - Orthostatic VS BP Location FiO2 (%)   Oral Monitor Sitting Left arm --      Pain Score       --             Orthostatic Vital Signs  Vitals:    05/19/22 1141 05/19/22 1345   BP: (!) 183/70 100/53   Pulse: 95 85   Patient Position - Orthostatic VS: Sitting        Physical Exam  Vitals and nursing note reviewed  Constitutional:       General: She is not in acute distress  Appearance: Normal appearance  She is normal weight  She is not ill-appearing, toxic-appearing or diaphoretic  HENT:      Head: Normocephalic and atraumatic  Nose: Nose normal    Eyes:      General: No scleral icterus  Right eye: No discharge  Left eye: No discharge  Conjunctiva/sclera: Conjunctivae normal    Cardiovascular:      Rate and Rhythm: Normal rate  Pulses: Normal pulses  Heart sounds: No murmur heard  No friction rub  No gallop  Pulmonary:      Effort: Pulmonary effort is normal  No respiratory distress  Breath sounds: No stridor  No wheezing, rhonchi or rales  Abdominal:      General: Abdomen is flat  Bowel sounds are normal  There is no distension  Palpations: There is no mass  Tenderness: There is no abdominal tenderness  There is no guarding or rebound  Musculoskeletal:      Right lower leg: No edema  Left lower leg: No edema  Skin:     General: Skin is warm        Capillary Refill: Capillary refill takes less than 2 seconds  Neurological:      Mental Status: She is alert and oriented to person, place, and time  Mental status is at baseline  Psychiatric:         Mood and Affect: Mood normal          Behavior: Behavior normal          ED Medications  Medications   sodium chloride 0 9 % bolus 1,000 mL (0 mL Intravenous Stopped 5/19/22 1344)   metoclopramide (REGLAN) injection 10 mg (10 mg Intravenous Given 5/19/22 1239)   diphenhydrAMINE (BENADRYL) injection 25 mg (25 mg Intravenous Given 5/19/22 1304)       Diagnostic Studies  Results Reviewed     Procedure Component Value Units Date/Time    Urine culture [[id number]] Collected: 05/19/22 1343    Lab Status:  In process Specimen: Urine, Clean Catch Updated: 05/19/22 1459    Urine Microscopic [[id number]]  (Abnormal) Collected: 05/19/22 1343    Lab Status: Final result Specimen: Urine, Clean Catch Updated: 05/19/22 1436     RBC, UA 0-1 /hpf      WBC, UA 2-4 /hpf      Epithelial Cells Moderate /hpf      Bacteria, UA Moderate /hpf     UA (URINE) with reflex to Scope [[id number]]  (Abnormal) Collected: 05/19/22 1343    Lab Status: Final result Specimen: Urine, Clean Catch Updated: 05/19/22 1404     Color, UA Yellow     Clarity, UA Slightly Cloudy     Specific [place], UA 1 020     pH, UA 7 0     Leukocytes, UA Trace     Nitrite, UA Negative     Protein, UA Negative mg/dl      Glucose, UA Negative mg/dl      Ketones, UA Negative mg/dl      Urobilinogen, UA 0 2 E U /dl      Bilirubin, UA Negative     Blood, UA Negative    hCG, quantitative [[id number]]  (Abnormal) Collected: 05/19/22 1239    Lab Status: Final result Specimen: Blood from Arm, Right Updated: 05/19/22 1402     HCG, Quant 418,496 mIU/mL     Narrative:       Expected Ranges:     Approximate               Approximate HCG  Gestation age          Concentration ( mIU/mL)  _____________          ______________________   Kvng Ivanoff                      HCG values  0 2-1                       5-50  1-2   2-3                         100-5000  3-4                         500-91519  4-5                         1000-06470  5-6                         87569-550050  6-8                         02974-084106  8-12                        [id number]      Comprehensive metabolic panel [[id number]]  (Abnormal) Collected: 05/19/22 1239    Lab Status: Final result Specimen: Blood from Arm, Right Updated: 05/19/22 1311     Sodium 138 mmol/L      Potassium 3 7 mmol/L      Chloride 107 mmol/L      CO2 26 mmol/L      ANION GAP 5 mmol/L      BUN 10 mg/dL      Creatinine 0 64 mg/dL      Glucose 73 mg/dL      Calcium 9 0 mg/dL      AST 13 U/L      ALT 5 U/L      Alkaline Phosphatase 38 U/L      Total Protein 6 6 g/dL      Albumin 4 1 g/dL      Total Bilirubin 0 60 mg/dL      eGFR 123 ml/min/1 73sq m     Narrative:      National Kidney Disease Foundation guidelines for Chronic Kidney Disease (CKD):     Stage 1 with normal or high GFR (GFR > 90 mL/min/1 73 square meters)    Stage 2 Mild CKD (GFR = 60-89 mL/min/1 73 square meters)    Stage 3A Moderate CKD (GFR = 45-59 mL/min/1 73 square meters)    Stage 3B Moderate CKD (GFR = 30-44 mL/min/1 73 square meters)    Stage 4 Severe CKD (GFR = 15-29 mL/min/1 73 square meters)    Stage 5 End Stage CKD (GFR <15 mL/min/1 73 square meters)  Note: GFR calculation is accurate only with a steady state creatinine    CBC and differential [[id number]] Collected: 05/19/22 1239    Lab Status: Final result Specimen: Blood from Arm, Right Updated: 05/19/22 1256     WBC 7 65 Thousand/uL      RBC 4 20 Million/uL      Hemoglobin 13 0 g/dL      Hematocrit 38 1 %      MCV 91 fL      MCH 31 0 pg      MCHC 34 1 g/dL      RDW 11 6 %      MPV 8 9 fL      Platelets 810 Thousands/uL      nRBC 0 /100 WBCs      Neutrophils Relative 73 %      Immat GRANS % 1 %      Lymphocytes Relative 18 %      Monocytes Relative 8 %      Eosinophils Relative 0 %      Basophils Relative 0 %      Neutrophils Absolute 5 54 Thousands/µL      Immature Grans Absolute 0 04 Thousand/uL      Lymphocytes Absolute 1 41 Thousands/µL      Monocytes Absolute 0 60 Thousand/µL      Eosinophils Absolute 0 03 Thousand/µL      Basophils Absolute 0 03 Thousands/µL                  US OB < 14 weeks with transvaginal   Final Result by Demetrio Harris MD (1430)      Single live intrauterine gestation at 6 weeks 6 days  JAMSHID of 2023  Workstation performed: [id number]               Procedures  Procedures      ED Course  ED Course as of 22 1526   Thu May 19, 2022   1435 UA (URINE) with reflex to Scope(!)   1435 HCG QUANTITATIVE(!): [phone]   1435 hCG, quantitative(!)                                       MDM  Number of Diagnoses or Management Options  Bacteria in urine  Nausea and vomiting in pregnancy  Diagnosis management comments: 31 yo  at [facility] by LMP presents with intractable nausea/vomiting in pregnancy  Will confirm IUP with ultrasound and IV reglan to help nausea  Ultrasound shows IUP, and IV relgan helped alleviate nausea  Was able to tolerate PO intake with improvement in BP and symptoms  UA did show bacteria, and discharged with 7 days of macrobid  Follow up recommended with PCP and OB/GYN and return to the ED for worsening symptoms          Amount and/or Complexity of Data Reviewed  Clinical lab tests: ordered and reviewed  Tests in the radiology section of CPT®: ordered and reviewed    Risk of Complications, Morbidity, and/or Mortality  Presenting problems: minimal  Diagnostic procedures: low  Management options: low    Patient Progress  Patient progress: improved      Disposition  Final diagnoses:   Nausea and vomiting in pregnancy   Bacteria in urine     Time reflects when diagnosis was documented in both MDM as applicable and the Disposition within this note     Time User Action Codes Description Comment    2022  2:50 PM Darius Fraire Add [O21 9] Nausea and vomiting in pregnancy     5/19/2022  2:50 PM Minor Lacy Add [R82 71] Bacteria in urine       ED Disposition     ED Disposition   Discharge    Condition   Stable    Date/Time   Thu May 19, 2022  3:18 PM    Comment   Lyudmila Moncada discharge to home/self care  Follow-up Information     Follow up With Specialties Details Why Contact Info Additional Information    Samreen Ortega MD Family Medicine Go in 1 week  [address] [facility],[address] [facility], [address] [facility] [address] Emergency Department Emergency Medicine Go to  If symptoms worsen [address] [facility] [address] [facility] Emergency Department, [address], [place], [place], [address]          Discharge Medication List as of 5/19/2022  3:19 PM      START taking these medications    Details   nitrofurantoin (MACROBID) 100 mg capsule Take 1 capsule (100 mg total) by mouth in the morning and 1 capsule (100 mg total) in the evening  Do all this for 7 days  , Starting Thu 5/19/2022, Until Thu 5/26/2022, Normal         CONTINUE these medications which have NOT CHANGED    Details   !! ALPRAZolam (XANAX) 0 25 mg tablet Starting Fri 1/14/2022, Historical Med      !! ALPRAZolam (XANAX) 0 5 mg tablet 4 (four) times a day as needed  , Starting Fri 2/23/2018, Historical Med      amphetamine-dextroamphetamine (ADDERALL, 20MG,) 20 mg tablet Adderall 20 mg tablet, Historical Med      levonorgestrel (MIRENA) 20 MCG/24HR IUD 1 each by Intrauterine route once, Historical Med      Melatonin 1 MG CAPS Take 1 mg by mouth, Historical Med      metoclopramide (Reglan) 10 mg tablet Take 1 tablet (10 mg total) by mouth as needed in the morning and 1 tablet (10 mg total) as needed at noon and 1 tablet (10 mg total) as needed in the evening and 1 tablet (10 mg total) as needed before bedtime (nausea and vomiting)  , Starting Tue 5/17/2 022, Normal Multiple Vitamin (MULTIVITAMIN) tablet Take 1 tablet by mouth daily, Historical Med      Vitamin E 400 units TABS Take by mouth, Historical Med       !! - Potential duplicate medications found  Please discuss with provider  No discharge procedures on file  PDMP Review     None           ED Provider  Attending physically available and evaluated [facility]  I managed the patient along with the ED Attending      Electronically Signed by         Yolanda Sheriff DO  05/19/22 2810

## 2022-05-20 LAB — BACTERIA UR CULT: NORMAL

## 2022-05-30 PROBLEM — F98.8 ATTENTION DEFICIT DISORDER (ADD) IN ADULT: Status: ACTIVE | Noted: 2021-01-22

## 2022-05-30 PROBLEM — S06.0X9A CONCUSSION WITH LOSS OF CONSCIOUSNESS: Status: ACTIVE | Noted: 2022-02-16

## 2022-05-30 PROBLEM — M77.8 FLEXOR CARPI RADIALIS TENDINITIS: Status: ACTIVE | Noted: 2022-01-13

## 2022-05-30 PROBLEM — M25.532 BILATERAL WRIST PAIN: Status: ACTIVE | Noted: 2017-01-10

## 2022-05-30 RX ORDER — ONDANSETRON 4 MG/1
TABLET, ORALLY DISINTEGRATING ORAL
COMMUNITY
Start: 2022-02-24 | End: 2022-05-31 | Stop reason: SDUPTHER

## 2022-05-30 RX ORDER — DEXTROAMPHETAMINE SACCHARATE, AMPHETAMINE ASPARTATE, DEXTROAMPHETAMINE SULFATE AND AMPHETAMINE SULFATE 2.5; 2.5; 2.5; 2.5 MG/1; MG/1; MG/1; MG/1
TABLET ORAL
COMMUNITY
Start: 2022-05-02 | End: 2022-05-31

## 2022-05-30 RX ORDER — SENNOSIDES 8.6 MG
TABLET ORAL
COMMUNITY
Start: 2022-03-07 | End: 2022-05-31

## 2022-05-30 RX ORDER — BUTALBITAL, ACETAMINOPHEN AND CAFFEINE 50; 325; 40 MG/1; MG/1; MG/1
TABLET ORAL
COMMUNITY
Start: 2022-02-24 | End: 2022-05-31

## 2022-05-30 RX ORDER — DOCUSATE SODIUM 100 MG/1
CAPSULE, LIQUID FILLED ORAL
COMMUNITY
Start: 2022-03-07 | End: 2022-05-31

## 2022-05-30 RX ORDER — METHOCARBAMOL 500 MG/1
500 TABLET, FILM COATED ORAL 3 TIMES DAILY PRN
COMMUNITY
Start: 2022-02-24 | End: 2022-05-31

## 2022-05-30 RX ORDER — SERTRALINE HYDROCHLORIDE 25 MG/1
TABLET, FILM COATED ORAL
COMMUNITY
Start: 2022-04-24 | End: 2022-05-31

## 2022-05-31 ENCOUNTER — INITIAL PRENATAL (OUTPATIENT)
Dept: OBGYN CLINIC | Facility: CLINIC | Age: 27
End: 2022-05-31

## 2022-05-31 ENCOUNTER — TELEPHONE (OUTPATIENT)
Dept: OBGYN CLINIC | Facility: CLINIC | Age: 27
End: 2022-05-31

## 2022-05-31 VITALS
SYSTOLIC BLOOD PRESSURE: 110 MMHG | WEIGHT: 149 LBS | DIASTOLIC BLOOD PRESSURE: 74 MMHG | HEIGHT: 65 IN | BODY MASS INDEX: 24.83 KG/M2

## 2022-05-31 DIAGNOSIS — M54.42 ACUTE BILATERAL LOW BACK PAIN WITH BILATERAL SCIATICA: ICD-10-CM

## 2022-05-31 DIAGNOSIS — N63.11 MASS OF UPPER OUTER QUADRANT OF RIGHT BREAST: ICD-10-CM

## 2022-05-31 DIAGNOSIS — Z34.01 ENCOUNTER FOR SUPERVISION OF NORMAL FIRST PREGNANCY IN FIRST TRIMESTER: Primary | ICD-10-CM

## 2022-05-31 DIAGNOSIS — Z13.71 GENETIC DISEASE CARRIER STATUS TESTING, FEMALE: ICD-10-CM

## 2022-05-31 DIAGNOSIS — O21.9 NAUSEA AND VOMITING IN PREGNANCY: ICD-10-CM

## 2022-05-31 DIAGNOSIS — M54.41 ACUTE BILATERAL LOW BACK PAIN WITH BILATERAL SCIATICA: ICD-10-CM

## 2022-05-31 PROCEDURE — PNV: Performed by: PHYSICIAN ASSISTANT

## 2022-05-31 RX ORDER — ONDANSETRON 4 MG/1
4 TABLET, ORALLY DISINTEGRATING ORAL EVERY 8 HOURS SCHEDULED
Qty: 20 TABLET | Refills: 0 | Status: SHIPPED | OUTPATIENT
Start: 2022-05-31

## 2022-05-31 NOTE — TELEPHONE ENCOUNTER
Called pt's  back and clarified order for zofran frequency is every 8 hours, as needed  Pt's  verbalizes understanding and states has no further questions at this time

## 2022-05-31 NOTE — TELEPHONE ENCOUNTER
Pt's  Leslie Nain lmom - was in this morning with wife for NOB, received script for zofran and wants to clarify to use sparingly or how often

## 2022-05-31 NOTE — PROGRESS NOTES
NOB: consents signed, sequential screen discussed  Information for [facility] given  Reports nausea and vomiting was seen in ED on 5/19/2022 for vomiting  Pelvic ultrasound showed IUP at 6w6d size consistent with dates  Was given Reglan and Zofran  Did not tolerate Reglan well, felt like it made her very anxious  Was concerned to take Zofran due to nurse telling her it can cause birth defects  Reports still with significant nausea and vomiting  Reviewed small frequent meals, vitamin B6, ginger, unisom, Reviewed Zofran in length with patient, reviewed 2012 studies and findings  Reviewed we are comfortable prescribing to use as needed especially in cases of dehydration and hyperemesis  Patient would like script  Encouraged patient to continue using Unisom and B6  Script for Zofran 4mg Q 8hrs as needed given  No FM, cramping, VB, LOF, edema, domestic violence, or smoking  Tolerating PNV  Patient previously on adderall for ADD and Zoloft for anxiety  Stopped when found out she was pregnant  Reports feeling very fatigued  Feels emotionally ok  Reviewed safety of Zoloft in pregnancy and breastfeeding and to let us know if she would like to restart it  Reports had issues with sciatica pain and carpal tunnel prior to pregnancy that have just worsened in early pregnancy  Information and referral for physical therapy given  Reports in the last day or so noticed a superficial lump on her right breast  Tender and hard to touch  Has been itchy as well  Thought could be a bug bite  Exam: in upper out quadrant of right breast around 11 o'clock superficial redness with what appears to be a bug bite  1cm of firmness underneath red area, mildly tender reports burning sensation  Reviewed hydrocortisone topically to help with itching  Call office if worsening or develops fever or chills  Script for right breast ultrasound given to have done if worsening or not improving in the next week     TVUS: Roy IUP at [address] based on [address] of 2  05cm  (+)FHR of 167  OB panel lab work given including thyroid labs (increased fatigue and hyperemesis) and carrier testing (aware to check insurance for coverage and need for prior authorization prior to having done)  Continue routine prenatal care     Return to office in 4 weeks for ob check/physical exam

## 2022-06-01 ENCOUNTER — TELEPHONE (OUTPATIENT)
Dept: OBGYN CLINIC | Facility: CLINIC | Age: 27
End: 2022-06-01

## 2022-06-08 NOTE — TELEPHONE ENCOUNTER
[facility] and received carrier approval from Juan Jose FORBES on 6/8/2022      Ref # [id number] valid 6/1-9/1/2022

## 2022-06-17 ENCOUNTER — TELEPHONE (OUTPATIENT)
Dept: OBGYN CLINIC | Facility: CLINIC | Age: 27
End: 2022-06-17
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
## 2022-06-23 ENCOUNTER — ROUTINE PRENATAL (OUTPATIENT)
Dept: PERINATAL CARE | Facility: OTHER | Age: 27
End: 2022-06-23
Payer: COMMERCIAL

## 2022-06-23 VITALS
BODY MASS INDEX: 24.46 KG/M2 | HEIGHT: 65 IN | DIASTOLIC BLOOD PRESSURE: 68 MMHG | WEIGHT: 146.8 LBS | HEART RATE: 67 BPM | SYSTOLIC BLOOD PRESSURE: 105 MMHG

## 2022-06-23 DIAGNOSIS — Z36.82 ENCOUNTER FOR ANTENATAL SCREENING FOR NUCHAL TRANSLUCENCY: Primary | ICD-10-CM

## 2022-06-23 DIAGNOSIS — Z3A.12 12 WEEKS GESTATION OF PREGNANCY: ICD-10-CM

## 2022-06-23 PROCEDURE — 76813 OB US NUCHAL MEAS 1 GEST: CPT | Performed by: OBSTETRICS & GYNECOLOGY

## 2022-06-23 PROCEDURE — 99241 PR OFFICE CONSULTATION NEW/ESTAB PATIENT 15 MIN: CPT | Performed by: OBSTETRICS & GYNECOLOGY

## 2022-06-23 PROCEDURE — 36415 COLL VENOUS BLD VENIPUNCTURE: CPT | Performed by: OBSTETRICS & GYNECOLOGY

## 2022-06-23 NOTE — LETTER
2022    Latasha Friedman PA-C  [facility] [address]    Patient: Ruma Torres   YOB: 1995   Date of Visit: 2022   Gestational age Joanna Tyler of this communication: Routine       Dear Pita,    This patient was seen recently in our  office  The content of my evaluation today is in the ultrasound report under "OB Procedures" tab  Please don't hesitate to contact our office with any concerns or questions       Sincerely,      Laureano Khan MD  Attending Physician, [place]

## 2022-06-23 NOTE — PROGRESS NOTES
[address]: Ms Conchita Nolasco was seen today for nuchal translucency ultrasound  See ultrasound report under "OB Procedures" tab  Review of Systems   Constitutional: Negative for chills, fever and unexpected weight change  HENT: Negative for congestion, dental problem, facial swelling and sore throat  Eyes: Negative for visual disturbance  Respiratory: Negative for cough and shortness of breath  Cardiovascular: Negative for chest pain and palpitations  Gastrointestinal: Negative for diarrhea and vomiting  Endocrine: Negative for polydipsia  Genitourinary: Negative for dysuria and vaginal bleeding  Musculoskeletal: Negative for back pain and joint swelling  Skin: Negative for rash and wound  Allergic/Immunologic: Negative for immunocompromised state  Neurological: Negative for seizures and headaches  Hematological: Does not bruise/bleed easily  Psychiatric/Behavioral: Negative for hallucinations and suicidal ideas  Physical Exam  Constitutional:       General: She is not in acute distress  Appearance: Normal appearance  She is not ill-appearing, toxic-appearing or diaphoretic  HENT:      Head: Normocephalic and atraumatic  Nose: No congestion or rhinorrhea  Eyes:      General: No scleral icterus  Right eye: No discharge  Left eye: No discharge  Extraocular Movements: Extraocular movements intact  Conjunctiva/sclera: Conjunctivae normal    Pulmonary:      Effort: Pulmonary effort is normal  No respiratory distress  Musculoskeletal:      Cervical back: Normal range of motion  Skin:     Coloration: Skin is not jaundiced or pale  Findings: No erythema, lesion or rash  Neurological:      General: No focal deficit present  Mental Status: She is alert and oriented to person, place, and time     Psychiatric:         Mood and Affect: Mood normal          Behavior: Behavior normal          Please don't hesitate to contact our office with any concerns or questions    Carmelita Rooney MD

## 2022-06-23 NOTE — PATIENT INSTRUCTIONS
Thank you for choosing us for your  care today  If you have any questions about your ultrasound or care, please do not hesitate to contact us or your primary obstetrician  Some general instructions for your pregnancy are:    Protect against coronavirus: get vaccinated - pregnant women are increased risk of severe COVID  Notify your primary care doctor if you have any symptoms  Exercise: Aim for 22 minutes per day (150 minutes per week) of regular exercise  Walking is great! Nutrition: aim for calcium-rich and iron-rich foods as well as healthy sources of protein  Learn about Preeclampsia: preeclampsia is a common, serious high blood pressure complication in pregnancy  A blood pressure of 381AKSG (systolic or top number) or 23UGGE (diastolic or bottom number) is not normal and needs evaluation by your doctor  Aspirin is sometimes prescribed in early pregnancy to prevent preeclampsia in women with risk factors - ask your obstetrician if you should be on this medication  If you smoke, try to reduce how many cigarettes you smoke or try to quit completely  Do not vape  Other warning signs to watch out for in pregnancy or postpartum: chest pain, obstructed breathing or shortness of breath, seizures, thoughts of hurting yourself or your baby, bleeding, a painful or swollen leg, fever, or headache (see [facility] POST-BIRTH Warning Signs campaign)  If these happen call 911  Itching is also not normal in pregnancy and if you experience this, especially over your hands and feet, potentially worse at night, notify your doctors

## 2022-06-23 NOTE — PROGRESS NOTES
Patient chose to have [facility] Non-invasive Prenatal Screen  Patient given brochure and is aware [facility] will contact patients insurance and coordinate coverage  Patient made aware she will need to respond to text message or e-mail from [employer] within 2 business days or testing will be run through insurance  Patient informed text message will come from area code  "415"  Provided [address] [place] [address] # [phone] and web site : [email or web address]     2 vials of blood drawn from RIGHT arm, patient tolerated blood draw without difficulty  Specimens labeled with patient identifiers (name, date of birth, specimen collection date), packed and sent via [employer]  Copy of lab order scanned to Epic media  [facility] will have results in approximately 7-10 business days and will call patient or notify via [address]  Patient aware viewing lab result online will reveal fetal sex If ordered  Patient verbalized understanding of all instructions and no questions at this time

## 2022-06-30 ENCOUNTER — INITIAL PRENATAL (OUTPATIENT)
Dept: OBGYN CLINIC | Facility: CLINIC | Age: 27
End: 2022-06-30

## 2022-06-30 VITALS — BODY MASS INDEX: 24.3 KG/M2 | SYSTOLIC BLOOD PRESSURE: 102 MMHG | WEIGHT: 146 LBS | DIASTOLIC BLOOD PRESSURE: 70 MMHG

## 2022-06-30 DIAGNOSIS — Z34.01 ENCOUNTER FOR SUPERVISION OF NORMAL FIRST PREGNANCY IN FIRST TRIMESTER: ICD-10-CM

## 2022-06-30 DIAGNOSIS — Z11.3 SCREENING EXAMINATION FOR VENEREAL DISEASE: Primary | ICD-10-CM

## 2022-06-30 PROCEDURE — PNV: Performed by: PHYSICIAN ASSISTANT

## 2022-06-30 NOTE — PROGRESS NOTES
Visit: STD cultures done  Recently annual with pap in 1/2022  Reports nausea, vomiting mostly at night, improving  Taking Zofran as needed  Headaches, reviewed tylenol, rest, hydration, No FM, cramping, VB, LOF, edema, domestic violence, or smoking  Tolerating PNV  Sciatica pain still bad, following with chiropractor, planning to see massage therapist and physical therapy  Script for AFP given, script for labs reprinted for patient  Urine: neg protein/neg glucose  Continue routine prenatal care  Return to office in 4 weeks for ob check

## 2022-07-01 ENCOUNTER — TELEPHONE (OUTPATIENT)
Dept: PERINATAL CARE | Facility: CLINIC | Age: 27
End: 2022-07-01

## 2022-07-01 NOTE — TELEPHONE ENCOUNTER
----- Message from Mai Posada MD sent at 2022  9:21 AM EDT -----  Hi  RN staff, I've reviewed this NIPS result which shows low residual risk for the conditions tested (trisomies 13, 18, and 21, and sex chromosome abnormality), can you call her to inform her of this result if she has not viewed her result via [employer]? Her OB office is to order the MSAFP  and I sent her a [employer] message as an FYI  Thank you    Mai Posada MD

## 2022-07-01 NOTE — TELEPHONE ENCOUNTER
Left VMM for pt with her [facility]  NIPS result, gender not provided  Instructed pt on MSAFP being ordered by OB and timing of the test  Pt instructed to call nurse line with any questions

## 2022-07-02 LAB
C TRACH RRNA SPEC QL NAA+PROBE: NOT DETECTED
N GONORRHOEA RRNA SPEC QL NAA+PROBE: NOT DETECTED

## 2022-07-28 ENCOUNTER — TELEPHONE (OUTPATIENT)
Dept: OBGYN CLINIC | Facility: CLINIC | Age: 27
End: 2022-07-28

## 2022-07-28 ENCOUNTER — ROUTINE PRENATAL (OUTPATIENT)
Dept: OBGYN CLINIC | Facility: CLINIC | Age: 27
End: 2022-07-28

## 2022-07-28 VITALS — WEIGHT: 151 LBS | BODY MASS INDEX: 25.13 KG/M2 | SYSTOLIC BLOOD PRESSURE: 102 MMHG | DIASTOLIC BLOOD PRESSURE: 68 MMHG

## 2022-07-28 DIAGNOSIS — Z34.92 PRENATAL CARE IN SECOND TRIMESTER: ICD-10-CM

## 2022-07-28 DIAGNOSIS — Z3A.17 17 WEEKS GESTATION OF PREGNANCY: Primary | ICD-10-CM

## 2022-07-28 PROCEDURE — PNV: Performed by: STUDENT IN AN ORGANIZED HEALTH CARE EDUCATION/TRAINING PROGRAM

## 2022-07-28 NOTE — PATIENT INSTRUCTIONS
Pregnancy at 15 to 18 Weeks   AMBULATORY CARE:   What changes are happening to your body:  Now that you are in your second trimester, you have more energy  You may also feel hungrier than usual  You may start to experience other symptoms, such as heartburn or dizziness  You may be gaining about ½ to 1 pound a week, and your pregnancy is beginning to show  You may need to start wearing maternity clothes  Seek care immediately if:   You have pain or cramping in your abdomen or low back  You have heavy vaginal bleeding or clotting  You pass material that looks like tissue or large clots  Collect the material and bring it with you  Call your doctor or obstetrician if:   You cannot keep food or drinks down, and you are losing weight  You have light bleeding  You have chills or a fever  You have vaginal itching, burning, or pain  You have yellow, green, white, or foul-smelling vaginal discharge  You have pain or burning when you urinate, less urine than usual, or pink or bloody urine  You have questions or concerns about your condition or care  How to care for yourself at this stage of your pregnancy:       Manage heartburn  by eating 4 or 5 small meals each day instead of large meals  Avoid spicy foods  Avoid eating right before bedtime  Manage nausea and vomiting  Avoid fatty and spicy foods  Eat small meals throughout the day instead of large meals  Maggy may help to decrease nausea  Ask your healthcare provider about other ways of decreasing nausea and vomiting  Eat a variety of healthy foods  Healthy foods include fruits, vegetables, whole-grain breads, low-fat dairy foods, beans, lean meats, and fish  Drink liquids as directed  Ask how much liquid to drink each day and which liquids are best for you  Limit caffeine to less than 200 milligrams each day  Limit your intake of fish to 2 servings each week   Choose fish low in mercury such as canned light tuna, shrimp, salmon, cod, or tilapia  Do not  eat fish high in mercury such as swordfish, tilefish, katelynn mackerel, and shark  Take prenatal vitamins as directed  Your need for certain vitamins and minerals, such as folic acid, increases during pregnancy  Prenatal vitamins provide some of the extra vitamins and minerals you need  Prenatal vitamins may also help to decrease the risk of certain birth defects  Do not smoke  Smoking increases your risk of a miscarriage and other health problems during your pregnancy  Smoking can cause your baby to be born too early or weigh less at birth  Ask your healthcare provider for information if you need help quitting  Do not drink alcohol  Alcohol passes from your body to your baby through the placenta  It can affect your baby's brain development and cause fetal alcohol syndrome (FAS)  FAS is a group of conditions that causes mental, behavior, and growth problems  Talk to your healthcare provider before you take any medicines  Many medicines may harm your baby if you take them when you are pregnant  Do not take any medicines, vitamins, herbs, or supplements without first talking to your healthcare provider  Never use illegal or street drugs (such as marijuana or cocaine) while you are pregnant  Safety tips during pregnancy:   Avoid hot tubs and saunas  Do not use a hot tub or sauna while you are pregnant, especially during your first trimester  Hot tubs and saunas may raise your baby's temperature and increase the risk of birth defects  Avoid toxoplasmosis  This is an infection caused by eating raw meat or being around infected cat feces  It can cause birth defects, miscarriages, and other problems  Wash your hands after you touch raw meat  Make sure any meat is well-cooked before you eat it  Avoid raw eggs and unpasteurized milk  Use gloves or ask someone else to clean your cat's litter box while you are pregnant      Changes that are happening with your baby:  By 72 weeks, your baby may be about 6 inches long from the top of the head to the rump (baby's bottom)  Your baby may weigh about 11 ounces  You may be able to feel your baby's movement at about 18 weeks or later  The first movements may not be that noticeable  They may feel like a fluttering sensation  Your baby also makes sucking movements and can hear certain sounds  What you need to know about prenatal care:  During the first 28 weeks of your pregnancy, you will see your healthcare provider once a month  Your healthcare provider will check your blood pressure and weight  You may also need any of the following:  A urine test  may also be done to check for sugar and protein  These can be signs of gestational diabetes or infection  A blood test  may be done to check for anemia (low iron level)  Fundal height check  is a measurement of your uterus to check your baby's growth  This number is usually the same as the number of weeks that you have been pregnant  An ultrasound  may be done to check your baby's development  Your healthcare provider may be able to tell you what your baby's gender is during the ultrasound  Your baby's heart rate  will be checked  © Copyright [employer] 2022 Information is for End User's use only and may not be sold, redistributed or otherwise used for commercial purposes  All illustrations and images included in CareNotes® are the copyrighted property of [employer]  or Kathrine Rollins  The above information is an  only  It is not intended as medical advice for individual conditions or treatments  Talk to your doctor, nurse or pharmacist before following any medical regimen to see if it is safe and effective for you

## 2022-07-28 NOTE — PROGRESS NOTES
32 y o   17w2d  Reports ++FM, no LOF, VB, or contractions       Vitals:    22 1600   BP: 102/68     S=D  +FHTs    A/P:  First tri labs not yet drawn, will get after vacation    Discussed pre-term labor precautions  Return to office in 4 weeks

## 2022-08-15 ENCOUNTER — TELEPHONE (OUTPATIENT)
Dept: OBGYN CLINIC | Facility: CLINIC | Age: 27
End: 2022-08-15

## 2022-08-15 NOTE — TELEPHONE ENCOUNTER
Pt states she is experiencing severe abdominal pain d/t constipation  She has been taking colace daily but last 2 weeks has been very constipated, with only 2 very sm bms  yesterday reports was in pain and distended so took a laxative tablet around 2000 with no results and has had increased abdominal pain since  last bm was on wed and very sm per pt  pt also mentions history of IBS was seeing specialist years ago but has not taken medication for IBS in a couple years as she was able to control with diet  she is hesitant to try OTC enema or suppositories as her pain has increased and still distended  Pt aware will review with on call provider for recommendations  TT sent to on call provider at this time

## 2022-08-15 NOTE — TELEPHONE ENCOUNTER
Pt lmom - having a lot of abdominal pain, 20 wks yesterday, very constipated and tried taking laxative and in extreme pain today with no movement

## 2022-08-15 NOTE — TELEPHONE ENCOUNTER
Reviewed with pt on call provider's recommendations for enema for constipation and if pt is uncomfortable with administering can be seen in triage for assessment  Pt states she will obtain enema and attempt self administering and if no relief or any worsening symptoms will call or go to be evaluated per pt  Also reviewed with pt on call provider's recommendations for bowel regimen of colace daily and adding in senna, and miralax prn  Pt verbalized understanding and aware if any further questions or concerns to call back

## 2022-08-19 ENCOUNTER — ROUTINE PRENATAL (OUTPATIENT)
Dept: PERINATAL CARE | Facility: CLINIC | Age: 27
End: 2022-08-19
Payer: COMMERCIAL

## 2022-08-19 VITALS
WEIGHT: 155.8 LBS | DIASTOLIC BLOOD PRESSURE: 58 MMHG | HEIGHT: 65 IN | HEART RATE: 80 BPM | BODY MASS INDEX: 25.96 KG/M2 | SYSTOLIC BLOOD PRESSURE: 109 MMHG

## 2022-08-19 DIAGNOSIS — Z3A.20 20 WEEKS GESTATION OF PREGNANCY: ICD-10-CM

## 2022-08-19 DIAGNOSIS — O44.40 LOW-LYING PLACENTA: ICD-10-CM

## 2022-08-19 DIAGNOSIS — Z36.3 ENCOUNTER FOR ANTENATAL SCREENING FOR MALFORMATIONS: Primary | ICD-10-CM

## 2022-08-19 DIAGNOSIS — Z36.86 ENCOUNTER FOR ANTENATAL SCREENING FOR CERVICAL LENGTH: ICD-10-CM

## 2022-08-19 PROCEDURE — 76805 OB US >/= 14 WKS SNGL FETUS: CPT | Performed by: OBSTETRICS & GYNECOLOGY

## 2022-08-19 PROCEDURE — 76817 TRANSVAGINAL US OBSTETRIC: CPT | Performed by: OBSTETRICS & GYNECOLOGY

## 2022-08-19 PROCEDURE — 99213 OFFICE O/P EST LOW 20 MIN: CPT | Performed by: NURSE PRACTITIONER

## 2022-08-19 RX ORDER — SENNA PLUS 8.6 MG/1
1 TABLET ORAL DAILY
COMMUNITY

## 2022-08-19 NOTE — PROGRESS NOTES
[address]: Ms Leonie Almeida was seen today for anatomic survey and cervical length screening ultrasound  See ultrasound report under "OB Procedures" tab  We discussed her low lying placenta and normal NIPS testing  Denies vaginal bleeding  Please don't hesitate to contact our office with any concerns or questions   -CORNELL Pagan    I spent 10 minutes devoted to patient care (3 min chart preparation, 5 minutes face to face and 2 minutes documenting)

## 2022-08-19 NOTE — PATIENT INSTRUCTIONS
Thank you for choosing us for your  care today  If you have any questions about your ultrasound or care, please do not hesitate to contact us or your primary obstetrician  Some general instructions for your pregnancy are:    Protect against coronavirus: get vaccinated - pregnant women are increased risk of severe COVID  Notify your primary care doctor if you have any symptoms  Exercise: Aim for 22 minutes per day (150 minutes per week) of regular exercise  Walking is great! Nutrition: aim for calcium-rich and iron-rich foods as well as healthy sources of protein  Learn about Preeclampsia: preeclampsia is a common, serious high blood pressure complication in pregnancy  A blood pressure of [id number] (systolic or top number) or 54GUEC (diastolic or bottom number) is not normal and needs evaluation by your doctor  Aspirin is sometimes prescribed in early pregnancy to prevent preeclampsia in women with risk factors - ask your obstetrician if you should be on this medication  If you smoke, try to reduce how many cigarettes you smoke or try to quit completely  Do not vape  Other warning signs to watch out for in pregnancy or postpartum: chest pain, obstructed breathing or shortness of breath, seizures, thoughts of hurting yourself or your baby, bleeding, a painful or swollen leg, fever, or headache (see [employer] POST-BIRTH Warning Signs campaign)  If these happen call 911  Itching is also not normal in pregnancy and if you experience this, especially over your hands and feet, potentially worse at night, notify your doctors

## 2022-08-21 PROBLEM — O44.40 LOW-LYING PLACENTA: Status: ACTIVE | Noted: 2022-08-21

## 2022-08-23 ENCOUNTER — TELEPHONE (OUTPATIENT)
Dept: OBGYN CLINIC | Facility: CLINIC | Age: 27
End: 2022-08-23

## 2022-08-23 DIAGNOSIS — Z3A.21 21 WEEKS GESTATION OF PREGNANCY: ICD-10-CM

## 2022-08-23 DIAGNOSIS — K59.00 CONSTIPATION, UNSPECIFIED CONSTIPATION TYPE: Primary | ICD-10-CM

## 2022-08-23 NOTE — TELEPHONE ENCOUNTER
Pt lmom - still having issues with constipation, has tried everything that was recommended but still doesn't feel like getting much better or if should be seen if she has a blockage or something

## 2022-08-23 NOTE — TELEPHONE ENCOUNTER
Pt states she has been having sharp pain and distention from constipation  she had called last week 8/15 with constipation complaints last bm 8/10 only small bm since  At that time per recommendations had discussed better bowel regimen and possible ED for eval and pt decided to do enema at home and she stated she had small bm after enema 8/16 and has been taking all medications for bowel regimen as recommended but has not had bm since 8/16 and is now experiencing sharp abdominal pains, distention, and feels very uncomfortable unable to eat much  On call provider made aware   Pt verbalizes understanding to ED for eval

## 2022-08-26 ENCOUNTER — APPOINTMENT (OUTPATIENT)
Dept: LAB | Facility: CLINIC | Age: 27
End: 2022-08-26
Payer: COMMERCIAL

## 2022-08-26 DIAGNOSIS — Z3A.01 LESS THAN 8 WEEKS GESTATION OF PREGNANCY: ICD-10-CM

## 2022-08-26 DIAGNOSIS — Z13.71 GENETIC DISEASE CARRIER STATUS TESTING, FEMALE: ICD-10-CM

## 2022-08-26 DIAGNOSIS — Z34.01 ENCOUNTER FOR SUPERVISION OF NORMAL FIRST PREGNANCY IN FIRST TRIMESTER: ICD-10-CM

## 2022-08-26 LAB
ABO GROUP BLD: NORMAL
B-HCG SERPL-ACNC: ABNORMAL MIU/ML
BASOPHILS # BLD AUTO: 0.04 THOUSANDS/ΜL (ref 0–0.1)
BASOPHILS NFR BLD AUTO: 0 % (ref 0–1)
BLD GP AB SCN SERPL QL: NEGATIVE
EOSINOPHIL # BLD AUTO: 0.11 THOUSAND/ΜL (ref 0–0.61)
EOSINOPHIL NFR BLD AUTO: 1 % (ref 0–6)
ERYTHROCYTE [DISTWIDTH] IN BLOOD BY AUTOMATED COUNT: 13.2 % (ref 11.6–15.1)
HBV SURFACE AG SER QL: NORMAL
HCT VFR BLD AUTO: 37.5 % (ref 34.8–46.1)
HCV AB SER QL: NORMAL
HGB BLD-MCNC: 12.1 G/DL (ref 11.5–15.4)
IMM GRANULOCYTES # BLD AUTO: 0.05 THOUSAND/UL (ref 0–0.2)
IMM GRANULOCYTES NFR BLD AUTO: 1 % (ref 0–2)
LYMPHOCYTES # BLD AUTO: 1.63 THOUSANDS/ΜL (ref 0.6–4.47)
LYMPHOCYTES NFR BLD AUTO: 18 % (ref 14–44)
MCH RBC QN AUTO: 31 PG (ref 26.8–34.3)
MCHC RBC AUTO-ENTMCNC: 32.3 G/DL (ref 31.4–37.4)
MCV RBC AUTO: 96 FL (ref 82–98)
MONOCYTES # BLD AUTO: 0.62 THOUSAND/ΜL (ref 0.17–1.22)
MONOCYTES NFR BLD AUTO: 7 % (ref 4–12)
NEUTROPHILS # BLD AUTO: 6.47 THOUSANDS/ΜL (ref 1.85–7.62)
NEUTS SEG NFR BLD AUTO: 73 % (ref 43–75)
NRBC BLD AUTO-RTO: 0 /100 WBCS
PLATELET # BLD AUTO: 316 THOUSANDS/UL (ref 149–390)
PMV BLD AUTO: 9.6 FL (ref 8.9–12.7)
PROGEST SERPL-MCNC: 55.8 NG/ML
RBC # BLD AUTO: 3.9 MILLION/UL (ref 3.81–5.12)
RH BLD: POSITIVE
RUBV IGG SERPL IA-ACNC: 18.4 IU/ML
T4 FREE SERPL-MCNC: 0.86 NG/DL (ref 0.76–1.46)
TSH SERPL DL<=0.05 MIU/L-ACNC: 1.65 UIU/ML (ref 0.45–4.5)
WBC # BLD AUTO: 8.92 THOUSAND/UL (ref 4.31–10.16)

## 2022-08-26 PROCEDURE — 36415 COLL VENOUS BLD VENIPUNCTURE: CPT

## 2022-08-26 PROCEDURE — 84144 ASSAY OF PROGESTERONE: CPT

## 2022-08-26 PROCEDURE — 83020 HEMOGLOBIN ELECTROPHORESIS: CPT

## 2022-08-26 PROCEDURE — 84702 CHORIONIC GONADOTROPIN TEST: CPT

## 2022-08-26 PROCEDURE — 84439 ASSAY OF FREE THYROXINE: CPT

## 2022-08-26 PROCEDURE — 81220 CFTR GENE COM VARIANTS: CPT

## 2022-08-26 PROCEDURE — 84443 ASSAY THYROID STIM HORMONE: CPT

## 2022-08-26 PROCEDURE — 86803 HEPATITIS C AB TEST: CPT

## 2022-08-26 PROCEDURE — 80081 OBSTETRIC PANEL INC HIV TSTG: CPT

## 2022-08-27 LAB
HIV 1+2 AB+HIV1 P24 AG SERPL QL IA: NORMAL
RPR SER QL: NORMAL

## 2022-08-29 ENCOUNTER — ROUTINE PRENATAL (OUTPATIENT)
Dept: OBGYN CLINIC | Facility: CLINIC | Age: 27
End: 2022-08-29

## 2022-08-29 VITALS
HEIGHT: 65 IN | BODY MASS INDEX: 26.56 KG/M2 | DIASTOLIC BLOOD PRESSURE: 60 MMHG | SYSTOLIC BLOOD PRESSURE: 108 MMHG | WEIGHT: 159.4 LBS

## 2022-08-29 DIAGNOSIS — Z3A.21 PREGNANCY WITH 21 COMPLETED WEEKS GESTATION: Primary | ICD-10-CM

## 2022-08-29 PROCEDURE — PNV: Performed by: OBSTETRICS & GYNECOLOGY

## 2022-08-29 NOTE — PROGRESS NOTES
Patient reports good fm, no n/v, headache, cramping, bleeding, loss of fluid,  dom violence, or smoking  odilon pnv some edema hands and feet    Works outside has been hot also breast and feet itcy, reviewed baby powder, wash when able or wipe down, benedryl and benedryl cream for itching call if spreading or getting worse, just excoriation on breast   Has pnc follow up discussed labs, cf and hgb electrophoresis pending, urine neg/neg  Constipation reviewed already on colace and miralax and senna

## 2022-08-30 LAB
HGB A MFR BLD: 2.7 % (ref 1.8–3.2)
HGB A MFR BLD: 97.3 % (ref 96.4–98.8)
HGB F MFR BLD: 0 % (ref 0–2)
HGB FRACT BLD-IMP: NORMAL
HGB S MFR BLD: 0 %

## 2022-09-02 LAB
CF COMMENT: NORMAL
CFTR MUT ANL BLD/T: NORMAL

## 2022-09-06 ENCOUNTER — TELEPHONE (OUTPATIENT)
Dept: OBGYN CLINIC | Facility: CLINIC | Age: 27
End: 2022-09-06

## 2022-09-06 NOTE — TELEPHONE ENCOUNTER
Pt lmom - going to [place] ([facility]) and needs to be vaccinated prior to leaving  Wants our thoughts on Ravinder Morelia and Ravinder Thibodeaux COVID vaccine so that she is vaccinated prior to leaving   Needs to be vaccinated 2 weeks prior to leaving the country

## 2022-09-06 NOTE — TELEPHONE ENCOUNTER
Pt wondering if recommended to receive covid vaccine in pregnancy  Pt aware covid vaccine recommended in pregnancy, pt has no further questions at this time

## 2022-09-19 ENCOUNTER — HOSPITAL ENCOUNTER (OUTPATIENT)
Facility: HOSPITAL | Age: 27
Discharge: HOME | End: 2022-09-19
Attending: STUDENT IN AN ORGANIZED HEALTH CARE EDUCATION/TRAINING PROGRAM | Admitting: STUDENT IN AN ORGANIZED HEALTH CARE EDUCATION/TRAINING PROGRAM
Payer: COMMERCIAL

## 2022-09-19 ENCOUNTER — NURSE TRIAGE (OUTPATIENT)
Dept: OTHER | Facility: OTHER | Age: 27
End: 2022-09-19

## 2022-09-19 VITALS
HEART RATE: 92 BPM | WEIGHT: 155 LBS | RESPIRATION RATE: 18 BRPM | TEMPERATURE: 98.5 F | HEIGHT: 65 IN | DIASTOLIC BLOOD PRESSURE: 62 MMHG | SYSTOLIC BLOOD PRESSURE: 108 MMHG | BODY MASS INDEX: 25.83 KG/M2 | OXYGEN SATURATION: 98 %

## 2022-09-19 PROBLEM — O36.8120 DECREASED FETAL MOVEMENTS IN SECOND TRIMESTER: Status: ACTIVE | Noted: 2022-09-19

## 2022-09-19 PROCEDURE — 99214 OFFICE O/P EST MOD 30 MIN: CPT

## 2022-09-19 PROCEDURE — 59025 FETAL NON-STRESS TEST: CPT

## 2022-09-19 PROCEDURE — 76815 OB US LIMITED FETUS(S): CPT

## 2022-09-19 PROCEDURE — 76815 OB US LIMITED FETUS(S): CPT | Performed by: STUDENT IN AN ORGANIZED HEALTH CARE EDUCATION/TRAINING PROGRAM

## 2022-09-19 PROCEDURE — 59025 FETAL NON-STRESS TEST: CPT | Performed by: STUDENT IN AN ORGANIZED HEALTH CARE EDUCATION/TRAINING PROGRAM

## 2022-09-19 RX ORDER — POLYETHYLENE GLYCOL 3350 17 G/17G
17 POWDER, FOR SOLUTION ORAL DAILY
COMMUNITY

## 2022-09-19 NOTE — TELEPHONE ENCOUNTER
Regarding: Decreased fetal movement  ----- Message from Lizy Fallon sent at 9/19/2022  6:48 PM EDT -----  "I am 25 weeks pregnant and I took a fall   I haven't felt my baby moving since that time "

## 2022-09-19 NOTE — TELEPHONE ENCOUNTER
Reason for Disposition   [1] Pregnant 23 or more weeks AND [2] no fetal movements or decreased fetal movements (e g , kick count < 5 in 1 hour or < 10 in 2 hours)    Answer Assessment - Initial Assessment Questions  1  MECHANISM: "How did the fall happen?"      Fell off the back of an enclosed trailer about 2 feet from the ground, ankle rolled and fell backwards onto hip     2  DOMESTIC VIOLENCE SCREENING: "Did you fall because someone pushed you or tried to hurt you?"      Denies    3  ONSET: "When did the fall happen?" (e g , minutes, hours, or days ago)      0830    4  LOCATION: "What part of the body hit the ground?" (e g , back, buttocks, head, hips, knees, hands, head, stomach)      Hip     5  INJURY: "Did you get hurt when you fell? Are there any obvious injuries?" If Yes, ask: "What does the injury look like?"      Scraped hip    6  PAIN: "Is there any pain?" If Yes, ask: "How bad is the pain?" (e g , Scale 1-10; or mild,   moderate, severe)    - NONE (0): no pain    - MILD (1-3): doesn't interfere with normal activities     - MODERATE (4-7): interferes with normal activities or awakens from sleep     - SEVERE (8-10): excruciating pain, unable to do any normal activities       6/10    7  SIZE: For cuts, bruises, or swelling, ask: "How large is it?" (e g , inches or centimeters)     Minimal     8  JAMSHID: "What date are you expecting to deliver?"      1/3/23    9  FETAL MOVEMENT: "Has the baby's movement decreased or changed significantly from   normal?"      Decreased-no movement since this morning    10   OTHER SYMPTOMS: "Do you have any other symptoms?" (e g , stomach pain, contractions, vaginal bleeding, leaking of fluid from vagina)        Belly feels tight and pulling feeling    Protocols used: PREGNANCY - FALL-ADULT-

## 2022-09-20 NOTE — PROGRESS NOTES
L&D Triage Note - OB/GYN  Lorene Calderon 32 y o  female MRN: [id number]  Unit/Bed#: LD TRIAGE 4 Encounter: [id number]      ASSESSMENT:    Lorene Calderon is a 32 y o   at 24w6d  who was evaluated today for decreased fetal movement  Due to her reassuring workup described below, it is felt to be safe to discharge her home at this time  Patient felt reassured by feeling baby move again and by seeing baby move on abdominal ultrasound  PLAN:    1) Perform NST  2) CARA  3) Continue routine prenatal care  4) Discharge from [facility] triage with  labor precautions    - Reviewed rupture of membranes, false vs true labor, decreased fetal movement, and vaginal bleeding   - Pt to call provider with any concerns and follow up at her next scheduled prenatal appointment    - Case discussed with Dr Sheridan Genera:    Lorene Calderon 32 y o  Alfonso Harris at 24w6d with an Estimated Date of Delivery: 1/3/23 who presents today after a fall this morning around 830  She fell from approximately 2 feet and landed on her left hip  She denies striking her belly and has only some abrasions on her left foot  She said that this evening she realized that she had not felt baby move since the fall and contacted health call who instructed her to come in  Patient says that she was feeling some pressure in her lower belly put felt that it was due to her placenta  While in triage she began to feel baby move again and now feels that the pressure she was feeling was actually secondary to baby kicking         Contractions: denies  Leakage of fluid: denies  Vaginal Bleeding: denies  Fetal movement: present    OBJECTIVE:    Vitals:    22   BP: 108/62   Pulse: 92   Resp: 18   Temp: 98 5 °F (36 9 °C)   SpO2: 98%       ROS  Constitutional: Negative  Respiratory: Negative  Cardiovascular: Negative    Gastrointestinal: Negative    General Physical Exam:  General: in no apparent distress, non-toxic and alert  Cardiovascular: Cor RRR  Lungs: non-labored breathing  Abdomen: abdomen is soft without significant tenderness, masses, organomegaly or guarding  Lower extremeties: nontender    Cervical Exam  SVE: patient declined    Fetal monitoring:  FHT:  135 bpm/ Moderate 6 - 25 bpm / 10 x 10 accelerations present, no decelerations  Sula: contractions not noted       UImaging:            Abd  US   CARA     - LVP: 3 34cm    Nicolás Oliveros MD,  OBGYN PGY-1  9/19/2022 9:30 PM

## 2022-09-20 NOTE — PROCEDURES
Willi Barnes, laney [id number] at 25w0d with an JAMSHID of 1/3/2023, by Last Menstrual Period, was seen at [address] for the following procedure(s): $Procedure Type: CARA, NST]    Nonstress Test  Reason for NST: Decreased Fetal Movement  Variability: Moderate  Decelerations: None  Accelerations: Yes  Baseline: 135 BPM  Uterine Irritability: No  Contractions: Not present   Interpretation  Nonstress Test Interpretation: Reactive  Overall Impression: Reassuring      4 Quadrant CARA  LVP (cm): 3 3 cm                      Armando Gayle MD

## 2022-09-20 NOTE — DISCHARGE INSTRUCTIONS
Pregnancy at 23 to 26 [address]:   What changes are happening in my body? You are now close to or at the beginning of the third trimester  The third trimester starts at 24 weeks and ends with delivery  As your baby gets larger, you may develop certain symptoms  These may include pain in your back or down the sides of your abdomen  You may also have stretch marks on your abdomen, breasts, thighs, or buttocks  You may also have constipation  How do I care for myself at this stage of my pregnancy? Eat a variety of healthy foods  Healthy foods include fruits, vegetables, whole-grain breads, low-fat dairy foods, beans, lean meats, and fish  Drink liquids as directed  Ask how much liquid to drink each day and which liquids are best for you  Limit caffeine to less than 200 milligrams each day  Limit your intake of fish to 2 servings each week  Choose fish low in mercury such as canned light tuna, shrimp, salmon, cod, or tilapia  Do not  eat fish high in mercury such as swordfish, tilefish, katelynn mackerel, and shark  Manage back pain  Do not stand for long periods of time or lift heavy items  Use good posture while you stand, squat, or bend  Wear low-heeled shoes with good support  Rest may also help to relieve back pain  Ask your healthcare provider about exercises you can do to strengthen your back muscles  Take prenatal vitamins as directed  Your need for certain vitamins and minerals, such as folic acid, increases during pregnancy  Prenatal vitamins provide some of the extra vitamins and minerals you need  Prenatal vitamins may also help to decrease the risk of certain birth defects  Talk to your healthcare provider about exercise  Moderate exercise can help you stay fit  Your healthcare provider will help you plan an exercise program that is safe for you during pregnancy  Do not smoke    Smoking increases your risk of a miscarriage and other health problems during your pregnancy  Smoking can cause your baby to be born too early or weigh less at birth  Ask your healthcare provider for information if you need help quitting  Do not drink alcohol  Alcohol passes from your body to your baby through the placenta  It can affect your baby's brain development and cause fetal alcohol syndrome (FAS)  FAS is a group of conditions that causes mental, behavior, and growth problems  Talk to your healthcare provider before you take any medicines  Many medicines may harm your baby if you take them when you are pregnant  Do not take any medicines, vitamins, herbs, or supplements without first talking to your healthcare provider  Never use illegal or street drugs (such as marijuana or cocaine) while you are pregnant  What are some safety tips during pregnancy? Avoid hot tubs and saunas  Do not use a hot tub or sauna while you are pregnant, especially during your first trimester  Hot tubs and saunas may raise your baby's temperature and increase the risk of birth defects  Avoid toxoplasmosis  This is an infection caused by eating raw meat or being around infected cat feces  It can cause birth defects, miscarriages, and other problems  Wash your hands after you touch raw meat  Make sure any meat is well-cooked before you eat it  Avoid raw eggs and unpasteurized milk  Use gloves or ask someone else to clean your cat's litter box while you are pregnant  What changes are happening with my baby? By 26 weeks, your baby will weigh about 2 pounds  Your baby will be about 10 inches long from the top of the head to the rump (baby's bottom)  Your baby's movements are much stronger now  Your baby's eyes are almost completely formed and can partially open  Your baby also sleeps and wakes up  What do I need to know about prenatal care? Your healthcare provider will check your blood pressure and weight   You may also need the following:  A urine test  may also be done to check for sugar and protein  These can be signs of gestational diabetes or infection  Protein in your urine may also be a sign of preeclampsia  Preeclampsia is a condition that can develop during week 20 or later of your pregnancy  It causes high blood pressure, and it can cause problems with your kidneys and other organs  A gestational diabetes screen  may be done  Your healthcare provider may order either a 1-step or 2-step oral glucose tolerance test (OGTT)  1-step OGTT:  Your blood sugar level will be tested after you have not eaten for 8 hours (fasting)  You will then be given a glucose drink  Your level will be tested again 1 hour and 2 hours after you finish the drink  2-step OGTT:  You do not have to fast for the first part of the test  You will have the glucose drink at any time of day  Your blood sugar level will be checked 1 hour later  If your blood sugar is higher than a certain level, another test will be ordered  You will fast and your blood sugar level will be tested  You will have the glucose drink  Your blood will be tested again 1 hour, 2 hours, and 3 hours after you finish the glucose drink  Fundal height  is a measurement of your uterus to check your baby's growth  This number is usually the same as the number of weeks that you have been pregnant  Your baby's heart rate  will be checked  When should I seek immediate care? You develop a severe headache that does not go away  You have new or increased vision changes, such as blurred or spotted vision  You have new or increased swelling in your face or hands  You have vaginal spotting or bleeding  Your water broke or you feel warm water gushing or trickling from your vagina  When should I call my doctor or obstetrician? You have abdominal cramps, pressure, or tightening  You have a change in vaginal discharge  You have light bleeding  You have chills or a fever      You have vaginal itching, burning, or pain     You have yellow, green, white, or foul-smelling vaginal discharge  You have pain or burning when you urinate, less urine than usual, or pink or bloody urine  You have questions or concerns about your condition or care  CARE AGREEMENT:   You have the right to help plan your care  Learn about your health condition and how it may be treated  Discuss treatment options with your healthcare providers to decide what care you want to receive  You always have the right to refuse treatment  The above information is an  only  It is not intended as medical advice for individual conditions or treatments  Talk to your doctor, nurse or pharmacist before following any medical regimen to see if it is safe and effective for you  © Copyright [employer] 2022 Information is for End User's use only and may not be sold, redistributed or otherwise used for commercial purposes   All illustrations and images included in CareNotes® are the copyrighted property of A D A M , Inc  or [address] [place] [address]

## 2022-10-19 NOTE — PROGRESS NOTES
OB/GYN  PN Visit  Monika Springer  [id number]  10/20/2022  5:02 PM  Dr Obed Sandoval    S: 32 y o  [id number] 29w2d here for PN visit  She denies contractions  She denies leakage of fluid and vaginal bleeding  She reports good fetal movement  She denies nausea, vomiting, headache, cramping, domestic violence, and smoking  She reports some edema Her pregnancy is uncomplicated  O:  Vitals:    10/20/22 1600   BP: 108/60     Physical Exam  Vitals reviewed  Constitutional:       General: She is not in acute distress  Appearance: Normal appearance  She is well-developed  She is not ill-appearing, toxic-appearing or diaphoretic  Cardiovascular:      Rate and Rhythm: Normal rate  Comments: Tenderness over left ribs above breast tissue; no evidence of LAD; Likely MSK  Pulmonary:      Effort: Pulmonary effort is normal    Abdominal:      General: There is no distension  Palpations: Abdomen is soft  There is no mass  Tenderness: There is no abdominal tenderness  There is no guarding or rebound  Genitourinary:     Comments: Gravid, nontender  Skin:     General: Skin is warm and dry  Neurological:      Mental Status: She is alert and oriented to person, place, and time  Psychiatric:         Mood and Affect: Mood normal          Behavior: Behavior normal        Fundal height: 137bpm  FHT: 30cm     A/P:  Problem List Items Addressed This Visit        Unprioritized    Low-lying placenta    29 weeks gestation of pregnancy     - Continue PNV  - Fetal kick counts reviewed  - Labs: 3rd trimester labs ordered today  - Genetics: NIPT wnl  - Ultrasounds: Level II wnl on 22 other than low lying placenta;  Next US scheduled for 10/28/22  - Tdap: Administered today  - Flu Shot: Deferred  - COVID: Vaccinated x2 (J&J + moderna)  - Delivery:  with epidural  - Contraception: Mirena IUD  - Breastfeeding: Yes, pumping eventually (pump ordered today)  - RTO in 2 week           Other Visit Diagnoses     Third trimester pregnancy    -  Primary    Relevant Orders    CBC and Platelet    Glucose, 1H PG    RPR            Future Appointments   Date Time Provider Danial Johnston   10/28/2022  3:30 PM   [address]   2022  4:30 PM Burke Wilkerson MD [facility]   2022  3:45 PM Marsha Anderson MD [facility]   2022  3:30 PM Marsha Anderson MD [address]   2022  3:45 PM Matthew Bernstein MD [facility]   2022  4:00 PM Burke Wilkerson MD [facility]   2022  4:00 PM Davion Valles   2022  4:15 PM Ricardo Mast [address]  10/20/2022  5:02 PM

## 2022-10-20 ENCOUNTER — ROUTINE PRENATAL (OUTPATIENT)
Dept: OBGYN CLINIC | Facility: CLINIC | Age: 27
End: 2022-10-20

## 2022-10-20 VITALS
WEIGHT: 177.6 LBS | SYSTOLIC BLOOD PRESSURE: 108 MMHG | BODY MASS INDEX: 29.59 KG/M2 | DIASTOLIC BLOOD PRESSURE: 60 MMHG | HEIGHT: 65 IN

## 2022-10-20 DIAGNOSIS — Z34.93 THIRD TRIMESTER PREGNANCY: Primary | ICD-10-CM

## 2022-10-20 DIAGNOSIS — O44.40 LOW-LYING PLACENTA: ICD-10-CM

## 2022-10-20 DIAGNOSIS — Z3A.29 29 WEEKS GESTATION OF PREGNANCY: ICD-10-CM

## 2022-10-20 PROBLEM — S06.0X9A CONCUSSION WITH LOSS OF CONSCIOUSNESS: Status: RESOLVED | Noted: 2022-02-16 | Resolved: 2022-10-20

## 2022-10-20 PROBLEM — Z30.432 ENCOUNTER FOR REMOVAL OF INTRAUTERINE CONTRACEPTIVE DEVICE: Status: RESOLVED | Noted: 2018-09-26 | Resolved: 2022-10-20

## 2022-10-20 PROBLEM — M25.531 BILATERAL WRIST PAIN: Status: RESOLVED | Noted: 2017-01-10 | Resolved: 2022-10-20

## 2022-10-20 PROBLEM — M25.569 KNEE PAIN: Status: RESOLVED | Noted: 2018-11-06 | Resolved: 2022-10-20

## 2022-10-20 PROBLEM — S62.654A CLOSED NONDISPLACED FRACTURE OF MIDDLE PHALANX OF RIGHT RING FINGER: Status: RESOLVED | Noted: 2018-12-08 | Resolved: 2022-10-20

## 2022-10-20 PROBLEM — O36.8120 DECREASED FETAL MOVEMENTS IN SECOND TRIMESTER: Status: RESOLVED | Noted: 2022-09-19 | Resolved: 2022-10-20

## 2022-10-20 PROBLEM — M70.50: Status: RESOLVED | Noted: 2018-11-06 | Resolved: 2022-10-20

## 2022-10-20 PROBLEM — M25.532 BILATERAL WRIST PAIN: Status: RESOLVED | Noted: 2017-01-10 | Resolved: 2022-10-20

## 2022-10-20 PROBLEM — S83.529A: Status: RESOLVED | Noted: 2018-11-06 | Resolved: 2022-10-20

## 2022-10-20 PROCEDURE — PNV: Performed by: OBSTETRICS & GYNECOLOGY

## 2022-10-20 NOTE — ASSESSMENT & PLAN NOTE
- Continue PNV  - Fetal kick counts reviewed  - Labs: 3rd trimester labs ordered today  - Genetics: NIPT wnl  - Ultrasounds: Level II wnl on 22 other than low lying placenta;  Next US scheduled for 10/28/22  - Tdap: Administered today  - Flu Shot: Deferred  - COVID: Vaccinated x2 (J&J + moderna)  - Delivery:  with epidural  - Contraception: Mirena IUD  - Breastfeeding: Yes, pumping eventually (pump ordered today)  - RTO in 2 week

## 2022-10-21 LAB
DME PARACHUTE DELIVERY DATE REQUESTED: NORMAL
DME PARACHUTE ITEM DESCRIPTION: NORMAL
DME PARACHUTE ORDER STATUS: NORMAL
DME PARACHUTE SUPPLIER NAME: NORMAL
DME PARACHUTE SUPPLIER PHONE: NORMAL

## 2022-10-28 ENCOUNTER — ULTRASOUND (OUTPATIENT)
Dept: PERINATAL CARE | Facility: CLINIC | Age: 27
End: 2022-10-28
Payer: COMMERCIAL

## 2022-10-28 VITALS
DIASTOLIC BLOOD PRESSURE: 60 MMHG | HEART RATE: 103 BPM | HEIGHT: 65 IN | BODY MASS INDEX: 29.29 KG/M2 | WEIGHT: 175.8 LBS | SYSTOLIC BLOOD PRESSURE: 110 MMHG

## 2022-10-28 DIAGNOSIS — O44.40 LOW-LYING PLACENTA: Primary | ICD-10-CM

## 2022-10-28 DIAGNOSIS — Z3A.30 30 WEEKS GESTATION OF PREGNANCY: ICD-10-CM

## 2022-10-28 DIAGNOSIS — Z36.89 ENCOUNTER FOR ULTRASOUND TO ASSESS FETAL GROWTH: ICD-10-CM

## 2022-10-28 PROCEDURE — 76816 OB US FOLLOW-UP PER FETUS: CPT | Performed by: OBSTETRICS & GYNECOLOGY

## 2022-10-28 PROCEDURE — 76817 TRANSVAGINAL US OBSTETRIC: CPT | Performed by: OBSTETRICS & GYNECOLOGY

## 2022-10-28 NOTE — LETTER
October 28, 2022     Magalie Mohr, [address] [facility]  [address] [address]    Patient: Conchis Wilson   YOB: 1995   Date of Visit: 10/28/2022       Dear Dr Emilia Roblero: Thank you for referring Conchis Wilson to me for evaluation  Below are my notes for this consultation  If you have questions, please do not hesitate to call me  I look forward to following your patient along with you  Sincerely,        Blue Mora MD        CC: No Recipients  Blue Mora MD  10/28/2022  4:13 PM  Sign when Signing Visit  [address]: Ms Mathew Mott was seen today at 30w3d for followup placental location ultrasound with fetal growth measurement  See ultrasound report under "OB Procedures" tab    Please don't hesitate to contact our office with any concerns or questions   -Blue Mora

## 2022-10-28 NOTE — PROGRESS NOTES
[address]: Ms Hiram Truong was seen today at 30w3d for followup placental location ultrasound with fetal growth measurement  See ultrasound report under "OB Procedures" tab    Please don't hesitate to contact our office with any concerns or questions   -Dontrell Horne

## 2022-10-28 NOTE — PATIENT INSTRUCTIONS
Thank you for choosing us for your  care today  If you have any questions about your ultrasound or care, please do not hesitate to contact us or your primary obstetrician  Some general instructions for your pregnancy are:    Protect against coronavirus: get vaccinated - pregnant women are increased risk of severe COVID  Notify your primary care doctor if you have any symptoms  Exercise: Aim for 22 minutes per day (150 minutes per week) of regular exercise  Walking is great! Nutrition: aim for calcium-rich and iron-rich foods as well as healthy sources of protein  Learn about Preeclampsia: preeclampsia is a common, serious high blood pressure complication in pregnancy  A blood pressure of 346WBAJ (systolic or top number) or 50CRYB (diastolic or bottom number) is not normal and needs evaluation by your doctor  Aspirin is sometimes prescribed in early pregnancy to prevent preeclampsia in women with risk factors - ask your obstetrician if you should be on this medication  If you smoke, try to reduce how many cigarettes you smoke or try to quit completely  Do not vape  Other warning signs to watch out for in pregnancy or postpartum: chest pain, obstructed breathing or shortness of breath, seizures, thoughts of hurting yourself or your baby, bleeding, a painful or swollen leg, fever, or headache (see AWHON[facility] POST-BIRTH Warning Signs campaign)  If these happen call 911  Itching is also not normal in pregnancy and if you experience this, especially over your hands and feet, potentially worse at night, notify your doctors

## 2022-10-31 ENCOUNTER — APPOINTMENT (OUTPATIENT)
Dept: LAB | Facility: CLINIC | Age: 27
End: 2022-10-31

## 2022-10-31 DIAGNOSIS — Z34.93 THIRD TRIMESTER PREGNANCY: ICD-10-CM

## 2022-10-31 LAB
ERYTHROCYTE [DISTWIDTH] IN BLOOD BY AUTOMATED COUNT: 12 % (ref 11.6–15.1)
GLUCOSE 1H P 50 G GLC PO SERPL-MCNC: 66 MG/DL (ref 40–134)
HCT VFR BLD AUTO: 34.5 % (ref 34.8–46.1)
HGB BLD-MCNC: 10.9 G/DL (ref 11.5–15.4)
MCH RBC QN AUTO: 30.4 PG (ref 26.8–34.3)
MCHC RBC AUTO-ENTMCNC: 31.6 G/DL (ref 31.4–37.4)
MCV RBC AUTO: 96 FL (ref 82–98)
PLATELET # BLD AUTO: 350 THOUSANDS/UL (ref 149–390)
PMV BLD AUTO: 9.9 FL (ref 8.9–12.7)
RBC # BLD AUTO: 3.59 MILLION/UL (ref 3.81–5.12)
WBC # BLD AUTO: 9.96 THOUSAND/UL (ref 4.31–10.16)

## 2022-11-01 LAB — RPR SER QL: NORMAL

## 2022-11-04 ENCOUNTER — ROUTINE PRENATAL (OUTPATIENT)
Dept: OBGYN CLINIC | Facility: CLINIC | Age: 27
End: 2022-11-04

## 2022-11-04 VITALS
WEIGHT: 175.8 LBS | DIASTOLIC BLOOD PRESSURE: 64 MMHG | HEIGHT: 65 IN | BODY MASS INDEX: 29.29 KG/M2 | SYSTOLIC BLOOD PRESSURE: 98 MMHG

## 2022-11-04 DIAGNOSIS — F41.1 ANXIETY STATE: ICD-10-CM

## 2022-11-04 DIAGNOSIS — F98.8 ATTENTION DEFICIT DISORDER (ADD) IN ADULT: ICD-10-CM

## 2022-11-04 DIAGNOSIS — Z3A.31 31 WEEKS GESTATION OF PREGNANCY: Primary | ICD-10-CM

## 2022-11-04 NOTE — PROGRESS NOTES
OB/GYN  PN Visit  Stevo Eid  [id number]  2022  1:04 PM  Dr Ashia De La Torre: 32 y o   31w2d here for PN visit  She denies contractions  She denies leakage of fluid and vaginal bleeding  She reports good fetal movement  She denies nausea, vomiting, headache, cramping, edema, domestic violence, and smoking  Her pregnancy is uncomplicated  She has been having difficulty sleeping  She reports sleeping maybe 3-4 hours each night  She has trouble falling asleep and staying asleep  O:  Vitals:    22 1600   BP: 98/64     Physical Exam  Vitals reviewed  Constitutional:       General: She is not in acute distress  Appearance: Normal appearance  She is well-developed  She is not ill-appearing, toxic-appearing or diaphoretic  Cardiovascular:      Rate and Rhythm: Normal rate  Pulmonary:      Effort: Pulmonary effort is normal    Abdominal:      General: There is no distension  Palpations: Abdomen is soft  There is no mass  Tenderness: There is no abdominal tenderness  There is no guarding or rebound  Genitourinary:     Comments: Gravid, nontender  Skin:     General: Skin is warm and dry  Neurological:      Mental Status: She is alert and oriented to person, place, and time  Psychiatric:         Mood and Affect: Mood normal          Behavior: Behavior normal        Fundal height: 31cm  FHT: 156bpm     A/P:    Problem List        Unprioritized    Anxiety state    Attention deficit disorder (ADD) in adult    Overview     Not currently taking Adderall during pregnancy         Flexor carpi radialis tendinitis    31 weeks gestation of pregnancy    Current Assessment & Plan     - Continue Multivitamin w/ FA  - Labor precautions reviewed  - Fetal kick counts reviewed  - Labs: 3rd trimester labs wnl  - Genetics: NIPT wnl  - Ultrasounds: Level II wnl on 22 other than low lying placenta;  Most recent [address] on 10/28/22 showed that low lying placenta had resolved; Breech presentation - will need scan at 36 weeks to confirm position  - Tdap: Administered 10/20/22  - Flu Shot: Deferred  - COVID: Vaccinated x2 (J&J + moderna)  - Delivery:  with epidural  - Contraception: Mirena IUD  - Breastfeeding: Yes, pumping eventually  - Sleep: Reviewed sleep hygiene and habitual changes; Reviewed Melatonin, Benadryl, or Unisom;  Reviewed limited data on CBD in pregnancy  - RTO in 2 week                 Future Appointments   Date Time Provider Danial Johnston   2022  3:45 PM Mathis Blizzard, MD [address]   2022  3:30 PM Mathis Blizzard, MD [address]   2022  3:45 PM Luz Mccauley MD [facility]   2022  4:00 PM Naomi Leon MD [facility]   2022  4:00 PM Naoma Shown, [address]   2022  4:15 PM Naoma Shown, [id number] [place] [address]  2022  1:04 PM

## 2022-11-04 NOTE — PATIENT INSTRUCTIONS
Medications and Pregnancy    The following list of over-the-counter medications is usually considered safe to take during pregnancy  Take care to not double up on products containing acetaminophen (Tylenol)  Sleep  Benadryl (diphenhydramine) -? Take 1-2 tablets as needed at bedtime  Unisom (doxylamine) 25 mg tablet -? As needed at bedtime  Melatonin 5 mg tablet -? As needed at bedtime

## 2022-11-06 PROBLEM — Z3A.31 31 WEEKS GESTATION OF PREGNANCY: Status: ACTIVE | Noted: 2022-10-20

## 2022-11-06 NOTE — ASSESSMENT & PLAN NOTE
- Continue Multivitamin w/ FA  - Labor precautions reviewed  - Fetal kick counts reviewed  - Labs: 3rd trimester labs wnl  - Genetics: NIPT wnl  - Ultrasounds: Level II wnl on 22 other than low lying placenta; Most recent [address] on 10/28/22 showed that low lying placenta had resolved; Breech presentation - will need scan at 36 weeks to confirm position  - Tdap: Administered 10/20/22  - Flu Shot: Deferred  - COVID: Vaccinated x2 (J&J + moderna)  - Delivery:  with epidural  - Contraception: Mirena IUD  - Breastfeeding: Yes, pumping eventually  - Sleep: Reviewed sleep hygiene and habitual changes; Reviewed Melatonin, Benadryl, or Unisom;  Reviewed limited data on CBD in pregnancy  - RTO in 2 week

## 2022-11-10 ENCOUNTER — NURSE TRIAGE (OUTPATIENT)
Dept: OTHER | Facility: OTHER | Age: 27
End: 2022-11-10

## 2022-11-10 ENCOUNTER — HOSPITAL ENCOUNTER (OUTPATIENT)
Facility: HOSPITAL | Age: 27
Discharge: HOME | End: 2022-11-10
Attending: STUDENT IN AN ORGANIZED HEALTH CARE EDUCATION/TRAINING PROGRAM | Admitting: STUDENT IN AN ORGANIZED HEALTH CARE EDUCATION/TRAINING PROGRAM

## 2022-11-10 VITALS
TEMPERATURE: 98.5 F | HEIGHT: 65 IN | HEART RATE: 90 BPM | DIASTOLIC BLOOD PRESSURE: 61 MMHG | RESPIRATION RATE: 18 BRPM | BODY MASS INDEX: 29.16 KG/M2 | WEIGHT: 175 LBS | SYSTOLIC BLOOD PRESSURE: 109 MMHG

## 2022-11-10 NOTE — TELEPHONE ENCOUNTER
Regardin weeks pregnant just fall frontwards on bell a couple of minutes ago   ----- Message from Tiana David sent at 11/10/2022  6:24 PM EST -----  '' I'm 33 weeks pregnant, I just fall frontwards on my bell a couple of minutes ago ''

## 2022-11-10 NOTE — TELEPHONE ENCOUNTER
Reason for Disposition  • [1] Pregnant 20 or more weeks AND [2] fall to ground or floor (e g , walking and tripped)    Answer Assessment - Initial Assessment Questions  1  MECHANISM: "How did the fall happen?"      Tripped and fell onto belly   2  DOMESTIC VIOLENCE SCREENING: "Did you fall because someone pushed you or tried to hurt you?"      Denies   3  ONSET: "When did the fall happen?" (e g , minutes, hours, or days ago)      A few mins ago   4  LOCATION: "What part of the body hit the ground?" (e g , back, buttocks, head, hips, knees, hands, head, stomach)      Fell right onto stomach area  5  INJURY: "Did you get hurt when you fell? Are there any obvious injuries?" If Yes, ask: "What does the injury look like?"      *No Answer*  6  PAIN: "Is there any pain?" If Yes, ask: "How bad is the pain?" (e g , Scale 1-10; or mild,   moderate, severe)    - NONE (0): no pain    - MILD (1-3): doesn't interfere with normal activities     - MODERATE (4-7): interferes with normal activities or awakens from sleep     - SEVERE (8-10): excruciating pain, unable to do any normal activities       Mild   7  SIZE: For cuts, bruises, or swelling, ask: "How large is it?" (e g , inches or centimeters)     Denies   8  JAMSHID: "What date are you expecting to deliver?"      1 3 23  9  FETAL MOVEMENT: "Has the baby's movement decreased or changed significantly from   normal?"      Has not felt baby moving since falling  10  OTHER SYMPTOMS: "Do you have any other symptoms?" (e g , stomach pain, contractions, vaginal bleeding, leaking of fluid from vagina)       Tightness     33 weeks pregnant    Protocols used: PREGNANCY - FALL-ADULT-

## 2022-11-10 NOTE — TELEPHONE ENCOUNTER
Pt called in stating she is 33 weeks pregnant and just a few mins ago fell down on to her stomach  Per pt she tripped and fell while carrying trays  Pt stated she has not felt the baby move since she fell  Pt denies any bleeding or leakage of fluid  TT out to on call provider to make aware    Per on call provider pt is to head to [facility] to get checked  Pt and Charge Nurse made aware

## 2022-11-11 NOTE — PROGRESS NOTES
L&D Triage Note - OB/GYN  Tatiana Cooper 32 y o  female MRN: [id number]  Unit/Bed#: LD TRIAGE  Encounter: [id number]      ASSESSMENT/PLAN  Tatiana Cooper is a 32 y o   at [facility] presents following a fall at 6:15pm       1) Fall  - CARA adequate  - NST reactive over two hours  - Patient denies contractions, abdominal pain, leakage of fluids, vaginal bleeding    Discharge instructions  - Patient instructed to call if experiencing worsening contractions, vaginal bleeding, loss of fluid or decreased fetal movement  - Will follow up with OBGYN in office      She is a patient of [facility]   D/w Dr Deanna Barksdale, on call OBGYN Attending Physician  ______________    SUBJECTIVE    JAMSHID: Estimated Date of Delivery: 1/3/23    HPI:  32 y o  Andie Wu presents following a fall at 6:15pm this evening  Patient noted that she tripped and fell forward on her belly after working in her barn  Patient denies any abdominal pain, contractions, vaginal bleeding, LOF  Patient scraped her R shin but denies hitting her head  Patient denies dizziness or loss of consciousness following the fall  She endorses that she has been hydrating adequately all day  Contractions: not present  Leakage of fluid: not present  Vaginal Bleeding: not present  Fetal movement: present    Her obstetrical history is significant for fall in September and 2 prior spontaneous abortions and 1 prior ectopic pregnancy  ROS:  Constitutional: Negative  Respiratory: Negative  Cardiovascular: Negative    Gastrointestinal: Negative    Physical Exam  General: Well appearing, no distress  Respiratory: Unlabored breathing  Cardiovascular: Regular rate  Abdomen: Soft, gravid, nontender  Fundal Height: Appropriate for gestational age  Extremities: Warm and well perfused  Non tender        OBJECTIVE:  /61 (BP Location: Left arm)   Pulse 90   Temp 98 5 °F (36 9 °C) (Oral)   Resp 18   Ht 5' 5" (1 651 m)   Wt 79 4 kg (175 lb)   LMP 2022 BMI 29 12 kg/m²   Body mass index is 29 12 kg/m²  Labs: No results found for this or any previous visit (from the past 24 hour(s))  SVE:   patient declined    FHT:  Baseline Rate: 130 bpm  Variability: Moderate 6-25 bpm  Accelerations: 15 x 15 or greater  Decelerations: None    TOCO:   Contraction Frequency (minutes): irritability  Contraction Quality: Not applicable (pt feels no contractions)    IMAGING:      TAUS   CARA      - Q1 3 02 cm     - Q2 3 14cm     - Q3 2 92 cm     - Q4 2 83 cm     - Total: 11 91 cm   Placenta: Anterior   Presentation: Vertex    Examined with Dr Lisa Soriano MD  [facility] PGY1  11/10/2022  10:58 PM      Portions of the record may have been created with voice recognition software  Occasional wrong word or "sound a like" substitutions may have occurred due to the inherent limitations of voice recognition software    Read the chart carefully and recognize, using context, where substitutions have occurred

## 2022-11-11 NOTE — PROCEDURES
Willi Barnes, a [id number] at [address] [place] [address] with an JAMSHID of 1/3/2023, by Last Menstrual Period, was seen at [address] for the following procedure(s): $Procedure Type: CARA]          4 Quadrant CARA  CARA Q1 (cm): 3 cm  CARA Q2 (cm): 3 1 cm  CARA Q3 (cm): 2 9 cm  CARA Q4 (cm): 2 8 cm  CARA TOTAL (cm): 11 8 cm  LVP (cm): 3 1 cm    CARA performed by Dr Hurst Re

## 2022-11-14 ENCOUNTER — TELEPHONE (OUTPATIENT)
Dept: OBGYN CLINIC | Facility: CLINIC | Age: 27
End: 2022-11-14

## 2022-11-14 NOTE — TELEPHONE ENCOUNTER
Pt lmom - vet wanted her to let obgyn know that her cat came back with toxoplasmosis and is being treated and is improving  Doesn't think has any symptoms and thinks everything is fine but worth letting office know and would like a call back if any recommendations or to discuss

## 2022-11-14 NOTE — TELEPHONE ENCOUNTER
Sent tt to on call provider to see if there is any precautions that need to be taken, just waiting to hear back  Patient said we can send her a mychart message if nothing needs to be done, or if she does need to take precautions we can call her back

## 2022-11-17 ENCOUNTER — ROUTINE PRENATAL (OUTPATIENT)
Dept: OBGYN CLINIC | Facility: CLINIC | Age: 27
End: 2022-11-17

## 2022-11-17 VITALS
SYSTOLIC BLOOD PRESSURE: 110 MMHG | WEIGHT: 180.4 LBS | BODY MASS INDEX: 30.06 KG/M2 | DIASTOLIC BLOOD PRESSURE: 72 MMHG | HEIGHT: 65 IN

## 2022-11-17 DIAGNOSIS — Z3A.33 33 WEEKS GESTATION OF PREGNANCY: ICD-10-CM

## 2022-11-17 DIAGNOSIS — Z34.93 PRENATAL CARE IN THIRD TRIMESTER: Primary | ICD-10-CM

## 2022-11-17 NOTE — PATIENT INSTRUCTIONS
Pregnancy at 31 to 34 Weeks   AMBULATORY CARE:   Changes happening with your body: You may continue to have symptoms such as shortness of breath, heartburn, contractions, or swelling of your ankles and feet  You may be gaining about 1 pound a week now  Seek care immediately if:   You develop a severe headache that does not go away  You have new or increased vision changes, such as blurred or spotted vision  You have new or increased swelling in your face or hands  You have vaginal spotting or bleeding  Your water broke or you feel warm water gushing or trickling from your vagina  Call your obstetrician if:   You have more than 5 contractions in 1 hour  You notice any changes in your baby's movements  You have abdominal cramps, pressure, or tightening  You have a change in vaginal discharge  You have chills or a fever  You have vaginal itching, burning, or pain  You have yellow, green, white, or foul-smelling vaginal discharge  You have pain or burning when you urinate, less urine than usual, or pink or bloody urine  You have questions or concerns about your condition or care  How to care for yourself at this stage of your pregnancy:       Eat a variety of healthy foods  Healthy foods include fruits, vegetables, whole-grain breads, low-fat dairy foods, beans, lean meats, and fish  Drink liquids as directed  Ask how much liquid to drink each day and which liquids are best for you  Limit caffeine to less than 200 milligrams each day  Limit your intake of fish to 2 servings each week  Choose fish low in mercury such as canned light tuna, shrimp, salmon, cod, or tilapia  Do not  eat fish high in mercury such as swordfish, tilefish, katelynn mackerel, and shark  Manage heartburn  by eating 4 or 5 small meals each day instead of large meals  Avoid spicy food  Manage swelling  by lying down and putting your feet up  Take prenatal vitamins as directed    Your need for certain vitamins and minerals, such as folic acid, increases during pregnancy  Prenatal vitamins provide some of the extra vitamins and minerals you need  Prenatal vitamins may also help to decrease the risk of certain birth defects  Talk to your healthcare provider about exercise  Moderate exercise can help you stay fit  Your healthcare provider will help you plan an exercise program that is safe for you during pregnancy  Do not smoke  Smoking increases your risk of a miscarriage and other health problems during your pregnancy  Smoking can cause your baby to be born too early or weigh less at birth  Ask your healthcare provider for information if you need help quitting  Do not drink alcohol  Alcohol passes from your body to your baby through the placenta  It can affect your baby's brain development and cause fetal alcohol syndrome (FAS)  FAS is a group of conditions that causes mental, behavior, and growth problems  Talk to your healthcare provider before you take any medicines  Many medicines may harm your baby if you take them when you are pregnant  Do not take any medicines, vitamins, herbs, or supplements without first talking to your healthcare provider  Never use illegal or street drugs (such as marijuana or cocaine) while you are pregnant  Safety tips during pregnancy:   Avoid hot tubs and saunas  Do not use a hot tub or sauna while you are pregnant, especially during your first trimester  Hot tubs and saunas may raise your baby's temperature and increase the risk of birth defects  Avoid toxoplasmosis  This is an infection caused by eating raw meat or being around infected cat feces  It can cause birth defects, miscarriages, and other problems  Wash your hands after you touch raw meat  Make sure any meat is well-cooked before you eat it  Avoid raw eggs and unpasteurized milk  Use gloves or ask someone else to clean your cat's litter box while you are pregnant  Changes happening with your baby:  By 34 weeks, your baby may weigh more than 5 pounds  Your baby will be about 12 ½ inches long from the top of the head to the rump (baby's bottom)  Your baby is gaining about ½ pound a week  Your baby's eyes open and close now  Your baby's kicks and movements are more forceful at this time  What you need to know about prenatal care: Your healthcare provider will check your blood pressure and weight  You may also need the following:  A urine test  may also be done to check for sugar and protein  These can be signs of gestational diabetes or infection  Protein in your urine may also be a sign of preeclampsia  Preeclampsia is a condition that can develop during week 20 or later of your pregnancy  It causes high blood pressure, and it can cause problems with your kidneys and other organs  A gestational diabetes screen  may be done  Your healthcare provider may order either a 1-step or 2-step oral glucose tolerance test (OGTT)  1-step OGTT:  Your blood sugar level will be tested after you have not eaten for 8 hours (fasting)  You will then be given a glucose drink  Your level will be tested again 1 hour and 2 hours after you finish the drink  2-step OGTT:  You do not have to fast for the first part of the test  You will have the glucose drink at any time of day  Your blood sugar level will be checked 1 hour later  If your blood sugar is higher than a certain level, another test will be ordered  You will fast and your blood sugar level will be tested  You will have the glucose drink  Your blood will be tested again 1 hour, 2 hours, and 3 hours after you finish the glucose drink  A Tdap vaccine  may be recommended by your healthcare provider  Fundal height  is a measurement of your uterus to check your baby's growth  This number is usually the same as the number of weeks that you have been pregnant   Your healthcare provider may also check your baby's position  Your baby's heart rate  will be checked  Follow up with your obstetrician as directed:  Write down your questions so you remember to ask them during your visits  © Copyright [employer] 2022 Information is for End User's use only and may not be sold, redistributed or otherwise used for commercial purposes  All illustrations and images included in CareNotes® are the copyrighted property of A D A M [employer] Inc  or Kathrine Zapata   The above information is an  only  It is not intended as medical advice for individual conditions or treatments  Talk to your doctor, nurse or pharmacist before following any medical regimen to see if it is safe and effective for you

## 2022-11-17 NOTE — PROGRESS NOTES
Chery Donaldson is a 24-year-old  030 at 33 weeks and 2 days for routine prenatal care- she denies any cramping or contractions but notes an increase in pelvic pressure for the past 2 days that has caused significant discomfort  Denies any loss of fluid or vaginal bleeding  Endorses good fetal movement  Urine neg/neg  SVE closed/thick/high, vertex on Leopold's  Increase in pressure likely secondary to change of fetal position  Reviewed  labor precautions  Patient does have complaints of increased him numbness in feet numbness bilaterally- has been having some increased edema  Reviewed trial compression socks and braces for what is likely carpal tunnel pregnancy  Thirty week packet given today and reviewed  Tdap vaccine administered today  Return to office in 2 weeks or sooner if needed

## 2022-11-29 ENCOUNTER — TELEPHONE (OUTPATIENT)
Dept: OBGYN CLINIC | Facility: CLINIC | Age: 27
End: 2022-11-29

## 2022-11-29 DIAGNOSIS — Z20.7 EXPOSURE TO TOXOPLASMA SPECIES: Primary | ICD-10-CM

## 2022-11-29 NOTE — TELEPHONE ENCOUNTER
Pt states her cat was diagnosed with toxoplasmosis, is 2 wks into a 30 day treatment  she has not been caring for the cat but states cat sleeps in the bed with her/sits on couch with her, etc  She was reading symptoms on the internet and is nervous now, states only symptoms she is experiencing is SOB but thinks it is more r/t when she is lying down or baby's position  Pt requesting if any blood work could be done  TT sent to on call provider, received order to place for lab to see if history of exposure, pt aware, order placed and pt confirms will complete at [place] [id number] lab  Pt has no further questions at this time

## 2022-11-29 NOTE — TELEPHONE ENCOUNTER
Pt lmom - contacted the office 2 weeks ago about cat being diagnosed with toxoplasmosis, cat is on treatment and everything but wants to see if pt could be tested to be on the safe side  Doesn't have any neurologic symptoms but looked on the Internet there are other symptoms but could also just be pregnancy symptoms too  Has appt on Friday and wondering if could have lab work can get that done prior, would like a call back to see what options are

## 2022-12-02 ENCOUNTER — HOSPITAL ENCOUNTER (OUTPATIENT)
Facility: HOSPITAL | Age: 27
Discharge: HOME | End: 2022-12-02
Attending: OBSTETRICS & GYNECOLOGY | Admitting: OBSTETRICS & GYNECOLOGY

## 2022-12-02 ENCOUNTER — TELEPHONE (OUTPATIENT)
Dept: OBGYN CLINIC | Facility: CLINIC | Age: 27
End: 2022-12-02

## 2022-12-02 VITALS — HEART RATE: 108 BPM | OXYGEN SATURATION: 99 % | DIASTOLIC BLOOD PRESSURE: 66 MMHG | SYSTOLIC BLOOD PRESSURE: 112 MMHG

## 2022-12-02 PROBLEM — Z3A.35 35 WEEKS GESTATION OF PREGNANCY: Status: ACTIVE | Noted: 2022-10-20

## 2022-12-02 LAB
BACTERIA UR QL AUTO: ABNORMAL /HPF
BILIRUB UR QL STRIP: NEGATIVE
CLARITY UR: ABNORMAL
COLOR UR: ABNORMAL
GLUCOSE UR STRIP-MCNC: NEGATIVE MG/DL
HGB UR QL STRIP.AUTO: NEGATIVE
KETONES UR STRIP-MCNC: ABNORMAL MG/DL
LEUKOCYTE ESTERASE UR QL STRIP: ABNORMAL
NITRITE UR QL STRIP: NEGATIVE
NON-SQ EPI CELLS URNS QL MICRO: ABNORMAL /HPF
PH UR STRIP.AUTO: 6 [PH]
PROT UR STRIP-MCNC: NEGATIVE MG/DL
RBC #/AREA URNS AUTO: ABNORMAL /HPF
SP GR UR STRIP.AUTO: 1.01 (ref 1–1.03)
UROBILINOGEN UR STRIP-ACNC: <2 MG/DL
WBC #/AREA URNS AUTO: ABNORMAL /HPF

## 2022-12-02 RX ORDER — ACETAMINOPHEN 325 MG/1
975 TABLET ORAL EVERY 8 HOURS PRN
Status: DISCONTINUED | OUTPATIENT
Start: 2022-12-02 | End: 2022-12-02

## 2022-12-02 RX ORDER — ACETAMINOPHEN 325 MG/1
975 TABLET ORAL EVERY 8 HOURS SCHEDULED
Status: DISCONTINUED | OUTPATIENT
Start: 2022-12-02 | End: 2022-12-02 | Stop reason: HOSPADM

## 2022-12-02 RX ADMIN — ACETAMINOPHEN 975 MG: 325 TABLET, FILM COATED ORAL at 16:46

## 2022-12-02 NOTE — TELEPHONE ENCOUNTER
Pt called states she has been experiencing increased pressure and wondering if should could in to office for earlier appt today  Pt states increased pressure and stomach tightening has been experiencing since this morning  Pt states tightening is different than gonzalez bell she has experienced before, is not happening regularly but has been pretty constant, increases when movements/activities and feels uncomfortable when sitting per pt  Pt states she has also had increased nausea and heartburn for the last 2 days  Pt states she has been taking tylenol and tums that help provide relief  Pt denies any loss of fluids or bleeding  Pt states baby's movements are active, feels like increased activity noted  Reviewed with provider on call  Pt verbalizes understanding of provider's recommendations to L&D for evaluation  L&D made aware

## 2022-12-02 NOTE — PROGRESS NOTES
L&D Triage Note - OB/GYN  Lety Fuller 32 y o  female MRN: [id number]  Unit/Bed#: LD TRIAGE 3 Encounter: [id number]      ASSESSMENT:    Lety Fuller is a 32 y o   at 35w3d presenting for low back pain    PLAN:    1) Low back pain  - Lumbar back pain appears to be referred from prior sciatica  - Pain has improved with Tylenol and PO hydration  Encouraged Tylenol, heating pad and ice packs as needed  Pt states that this has helped in the past with her back pain  - FHT:Reactive, 130 bpm, moderate variability, 15x15 accelerations present, no decelerations  Contractions decreased in frequency  - UA sent, pending  - SVE 0 5/0/-4, Membranes in tact    Discharge Instructions:   Continue routine prenatal care  Discharge from [facility] triage with  labor precautions    - Reviewed rupture of membranes, false vs true labor, decreased fetal movement, and vaginal bleeding   - Pt to call provider with any concerns and follow up at her next scheduled prenatal appointment on 22 at 1545    - Case discussed with Dr Nguyen Books:    Lety Fuller 32 y o  [place] at 35w3d with an Estimated Date of Delivery: 1/3/23     Pt presents for increased lower back pain and pelvic pressure  She says that she felt increased pressure this morning before going to her scheduled prenatal appointment  She is also complaining of lumbar back pain bilaterally that radiates down the back of both of her legs  Denies leakage of fluid or vaginal bleeding  Reports increased fetal movement  She denies, fever, chills, chest pain, shortness of breath, dysuria, hematuria or any other complaints       Her current obstetrical history is significant for recent exposure to Toxoplasmosis (labs to be collected as outpatient)     Her past obstetrical history is significant for 2 prior SAB, 1 prior Ectopic (2022 with Mirena IUD in place - received Methotrexate)      OBJECTIVE:    Vitals:    22 1500   BP: 112/66   Pulse: (!) 108   SpO2: 99% ROS:  Constitutional: Negative  Respiratory: Negative  Cardiovascular: Negative    Gastrointestinal: Negative    General Physical Exam:  General: in no apparent distress, non-toxic, alert and afebrile  Cardiovascular: Cor RRR  Lungs: non-labored breathing  Abdomen: abdomen is soft without significant tenderness, masses, organomegaly or guarding  Lower extremeties: nontender    Cervical Exam  SVE: 0 5 / 0% / -4    Fetal monitoring:  FHT:  140 bpm/ Moderate 6 - 25 bpm / 15 x 15 accelerations present, no decelerations  [place]: contractions intermittent, pt felt decrease in contraction discomfort since arriving in triage       Dina Chaves DO  OBDALYN PGY-1  12/2/2022 3:08 PM

## 2022-12-03 ENCOUNTER — HOSPITAL ENCOUNTER (OUTPATIENT)
Facility: HOSPITAL | Age: 27
Discharge: HOME | End: 2022-12-03
Attending: OBSTETRICS & GYNECOLOGY | Admitting: OBSTETRICS & GYNECOLOGY

## 2022-12-03 ENCOUNTER — NURSE TRIAGE (OUTPATIENT)
Dept: OTHER | Facility: OTHER | Age: 27
End: 2022-12-03

## 2022-12-03 VITALS — DIASTOLIC BLOOD PRESSURE: 61 MMHG | SYSTOLIC BLOOD PRESSURE: 110 MMHG | HEART RATE: 102 BPM

## 2022-12-04 NOTE — TELEPHONE ENCOUNTER
Regardin Weeks and 5 days pregnant having decreased fetal movement  ----- Message from Sha Cassidy sent at 12/3/2022  8:10 PM EST -----  '' I'm 36 weeks and 5 days pregnant I'm having decreased fetal movement ''

## 2022-12-04 NOTE — TELEPHONE ENCOUNTER
Reason for Disposition  • [1] Pregnant 23 or more weeks AND [2] baby moving less today by kick count  (e g , kick count < 5 in 1 hour or < 10 in 2 hours)    Answer Assessment - Initial Assessment Questions  1  FETAL MOVEMENT: "Has the baby's movement decreased or changed significantly from normal?" (e g , yes, no; describe)      Has only felt one kick since 3:30pm; before was feeling baby move more  Sat for an hour and did kick counts and felt nothing  Felt one small kick an hour ago  3  PREGNANCY: "How many weeks pregnant are you?"       35w4d  4  OTHER SYMPTOMS: "Do you have any other symptoms?" (e g , abdominal pain, leaking fluid from vagina, vaginal bleeding, etc )     Patient is still having cramping-constant tightness  Currently 5/10  Denies other symptoms  Patient was at L&D last night; was having cramps that were contractions  Patient was having contractions 10 minutes apart until 2 am  Patient stated that she hasn't felt well during the day      Protocols used: PREGNANCY - DECREASED FETAL MOVEMENT-ADULT-AH

## 2022-12-04 NOTE — TELEPHONE ENCOUNTER
TC on call provider patient's symptoms; wanted patient evaluated  Patient verbalized understanding  MARY KATE WEEKS L&D Charge RN patient information and ETA

## 2022-12-04 NOTE — PROGRESS NOTES
Triage Note - OB  Michael Moralez 32 y o  female MRN: [id number]  Unit/Bed#:  TRIAGE 1- Encounter: [id number]    OB TRIAGE NOTE  Michael Moralez  [id number]  12/3/2022  10:06 PM  LD TRIAGE 1/LD TRIAGE 1-*    ASSESS:  32 y o   35w4d with dFM and abdominal tightening with no concern for PTL or fetal well being  PLAN  #1  dFM  · CARA:14 1 cm  · FHT reactive with no contractions  · Cardiac activity and fetal movement noted on TAUS  Anterior placenta  · Discussed that having an anterior placenta can be decreasing her ability feel fetal movement  Reviewed [employer]    #2  R/o PTL  · SVE: 1/0/-4  · FHT with no contractions    #3  Discharge instructions  · Patient instructed to call if experiencing worsening contractions, vaginal bleeding, loss of fluid or decreased fetal movement  · Will follow up with OBGYN on 22  D/w Dr Dominga Nails  ______________    SUBJECTIVE    JAMSHID: Estimated Date of Delivery: 1/3/23    HPI Chronology:  32 y o  Brenda Cardoza 35w4d presents with complaint of decreased fetal movement and uterine tightening  She last felt her baby move when she sat down to do [employer] two hours ago  She felt one movement in one hour  She was seen in triage for cramping yesterday    Contractions: yes  Leakage: no  Bleeding: no  Fetal Movement: decreased   Pelvic pain: yes    Vitals:   /61   Pulse 102   LMP 2022   There is no height or weight on file to calculate BMI  Review of Systems   Constitutional: Negative for chills and fever  Eyes: Negative for visual disturbance  Respiratory: Negative for choking, chest tightness and shortness of breath  Cardiovascular: Negative for chest pain and palpitations  Gastrointestinal: Negative for abdominal pain  Genitourinary: Negative for vaginal bleeding, vaginal discharge and vaginal pain  Musculoskeletal: Positive for back pain  Neurological: Negative for headaches  Physical Exam  HENT:      Head: Normocephalic        Mouth/Throat:      Pharynx: Oropharynx is clear  Eyes:      Conjunctiva/sclera: Conjunctivae normal    Cardiovascular:      Rate and Rhythm: Normal rate  Pulses: Normal pulses  Pulmonary:      Effort: Pulmonary effort is normal    Abdominal:      General: Abdomen is flat  Palpations: Abdomen is soft  Tenderness: There is no abdominal tenderness  Genitourinary:     General: Normal vulva  Skin:     General: Skin is warm  Capillary Refill: Capillary refill takes less than 2 seconds  Neurological:      Mental Status: She is alert and oriented to person, place, and time  Psychiatric:         Mood and Affect: Mood normal          FHT:  Baseline Rate: 140 bpm  Variability: Moderate 6-25 bpm  Accelerations: 15 x 15 or greater  Decelerations: None  FHR Category: Category I  TOCO:   Contraction Frequency (minutes): irritibility  Contraction Duration (seconds):  (0)  Contraction Quality: Not applicable    Labs: No results found for this or any previous visit (from the past 24 hour(s))  Lab, Imaging and other studies: I have personally reviewed pertinent reports  Deidre Munoz MD  12/3/2022  10:06 PM                DFM, r/o PTL 1/0/-4   CARA 14 1

## 2022-12-04 NOTE — PROCEDURES
Willi Barnes, a [id number] at 35w4d with an JAMSHID of 1/3/2023, by Last Menstrual Period, was seen at [address] for the following procedure(s): $Procedure Type: CARA]         4 Quadrant CARA  CARA Q1 (cm): 2 7 cm  CARA Q2 (cm): 5 cm  CARA Q3 (cm): 3 1 cm  CARA Q4 (cm): 3 4 cm  CARA TOTAL (cm): 14 2 cm     Anterior placenta           Nanda Knight MD  PGY-2  12/3/2022  10:15 PM

## 2022-12-05 ENCOUNTER — TELEPHONE (OUTPATIENT)
Dept: OBGYN CLINIC | Facility: CLINIC | Age: 27
End: 2022-12-05

## 2022-12-05 NOTE — TELEPHONE ENCOUNTER
PT DAFNE, was seen in ER on Saturday for DFM  She has been having some light pink bleeding since then, has not progressed  She just wanted it noted and was wondering if theres anything she can do  Would like a call back

## 2022-12-05 NOTE — TELEPHONE ENCOUNTER
Spoke to on call provider who would like patient to get checked out at l&D  Patient will be there in about an hour and a half  Let L&D know  yes

## 2022-12-05 NOTE — TELEPHONE ENCOUNTER
Spoke to patient, patient said she is having light pink when she wipes since being in the ER on Friday and Saturday  They did cervix checks and since then she has spotting  Told patient just to monitor if increased bleeding or changes to red to give us a call back  Patient said she is having cramping but it is the same cramping she has been having  I asked patient if she was having any decreased fetal movement she said yes but it isnt any less then when she was seen in the ER Friday and Saturday  Patient aware to call back if worsening or any changes

## 2022-12-09 ENCOUNTER — ROUTINE PRENATAL (OUTPATIENT)
Dept: OBGYN CLINIC | Facility: CLINIC | Age: 27
End: 2022-12-09

## 2022-12-09 VITALS — SYSTOLIC BLOOD PRESSURE: 118 MMHG | WEIGHT: 182 LBS | BODY MASS INDEX: 30.29 KG/M2 | DIASTOLIC BLOOD PRESSURE: 68 MMHG

## 2022-12-09 DIAGNOSIS — Z3A.35 35 WEEKS GESTATION OF PREGNANCY: Primary | ICD-10-CM

## 2022-12-09 LAB — EXTERNAL GROUP B STREP ANTIGEN: NEGATIVE

## 2022-12-09 NOTE — PROGRESS NOTES
Patient reports good fm, no v, headache, , loss of fluid,  dom violence, or smoking  odilon pnv patient very uncomfortable with sciatic, has been checked out at hospital multiple times this past week for pressure and decreased fetal movement  Had some spotting due to multiple cervical exams  Cervix today is closed 50-2    Urine neg/ negative GBS done reviewed with patient to rest and appropriate sciatic precautions ice and then heat, tylenol, chiropracter or PT, call with needs, rest return in 1 week labor talk
2

## 2022-12-16 ENCOUNTER — TELEPHONE (OUTPATIENT)
Dept: OBGYN CLINIC | Facility: CLINIC | Age: 27
End: 2022-12-16

## 2022-12-16 NOTE — TELEPHONE ENCOUNTER
Patient says she had a discussion with Dr Ainsley Brooks about her due date being one week behind  Said she discuss this with Selin about changing it and it was not updated in the system  She would like to be induced preferably before [place]

## 2022-12-19 ENCOUNTER — TELEPHONE (OUTPATIENT)
Dept: OBGYN CLINIC | Facility: CLINIC | Age: 27
End: 2022-12-19

## 2022-12-19 ENCOUNTER — ROUTINE PRENATAL (OUTPATIENT)
Dept: OBGYN CLINIC | Facility: CLINIC | Age: 27
End: 2022-12-19

## 2022-12-19 VITALS
SYSTOLIC BLOOD PRESSURE: 122 MMHG | HEIGHT: 65 IN | DIASTOLIC BLOOD PRESSURE: 74 MMHG | WEIGHT: 183.4 LBS | BODY MASS INDEX: 30.56 KG/M2

## 2022-12-19 DIAGNOSIS — Z34.03 ENCOUNTER FOR SUPERVISION OF NORMAL FIRST PREGNANCY IN THIRD TRIMESTER: Primary | ICD-10-CM

## 2022-12-19 NOTE — PROGRESS NOTES
VISIT: (+) n - coming and going; (+) cramping - tightness that comes and goes - consistent with how often it has been coming; and seems to be increasing in intensity; (+) edema - hands and feet; (+) sciatic pain down right leg - very uncomfortable; Denies v/HA/vb/lof/dv/smoking; urine neg/neg; GBS neg  PNVs + DHA - tolerating a MVI daily  Good FM - r/rosenda 10 kicks/2 hrs; reports FM less but does get 10 movements in a 2 hour period once a day  Tdap - done; Tearful today - so afraid of going into labor on [place]; so uncomfortable - desires 39 week IOL - will message triage; Reviewed signs and symptoms of labor and when to call; Reviewed signs and symptoms of pregnancy induced hypertension or preeclampsia and when to call  Planning on epidural; Patient has a car seat - advise installation in car at this time  Encourage patient to pack her bags    RTO in 1 weeks for routine ob check or sooner if needed

## 2022-12-19 NOTE — TELEPHONE ENCOUNTER
----- Message from Avinash Morris PA-C sent at 12/19/2022 11:43 AM EST -----  Desires IOL at 39 weeks JAMSHID 1/3/23 - first baby

## 2022-12-20 ENCOUNTER — NURSE TRIAGE (OUTPATIENT)
Dept: OTHER | Facility: OTHER | Age: 27
End: 2022-12-20

## 2022-12-21 ENCOUNTER — HOSPITAL ENCOUNTER (OUTPATIENT)
Facility: HOSPITAL | Age: 27
Discharge: HOME | End: 2022-12-21
Attending: STUDENT IN AN ORGANIZED HEALTH CARE EDUCATION/TRAINING PROGRAM | Admitting: STUDENT IN AN ORGANIZED HEALTH CARE EDUCATION/TRAINING PROGRAM

## 2022-12-21 VITALS
TEMPERATURE: 98 F | RESPIRATION RATE: 22 BRPM | SYSTOLIC BLOOD PRESSURE: 107 MMHG | DIASTOLIC BLOOD PRESSURE: 57 MMHG | HEART RATE: 99 BPM | OXYGEN SATURATION: 98 %

## 2022-12-21 PROBLEM — Z3A.38 38 WEEKS GESTATION OF PREGNANCY: Status: ACTIVE | Noted: 2022-10-20

## 2022-12-21 LAB
BILIRUB UR QL STRIP: NEGATIVE
CLARITY UR: CLEAR
COLOR UR: YELLOW
GLUCOSE UR STRIP-MCNC: NEGATIVE MG/DL
HGB UR QL STRIP.AUTO: NEGATIVE
KETONES UR STRIP-MCNC: NEGATIVE MG/DL
LEUKOCYTE ESTERASE UR QL STRIP: NEGATIVE
NITRITE UR QL STRIP: NEGATIVE
PH UR STRIP.AUTO: 6 [PH] (ref 4.5–8)
PROT UR STRIP-MCNC: NEGATIVE MG/DL
SP GR UR STRIP.AUTO: 1.02 (ref 1–1.03)
UROBILINOGEN UR QL STRIP.AUTO: 0.2 E.U./DL

## 2022-12-21 NOTE — TELEPHONE ENCOUNTER
Reason for Disposition  • [1] First baby (primipara) AND [2] contractions < 6 minutes apart  AND [3] present 2 hours    Answer Assessment - Initial Assessment Questions  1  ONSET: "When did the symptoms begin?"         2000     2  CONTRACTIONS: "Describe the contractions that you are having " (e g , duration, frequency, regularity, severity)      Between 5-8 minutes lasting 60 seconds     3  JAMSHID: "What date are you expecting to deliver?"      1/3/23    4  PARITY: "Have you had a baby before?" If Yes, ask: "How long did the labor last?"      Denies    5  FETAL MOVEMENT: "Has the baby's movement decreased or changed significantly from normal?"      Denies    6   OTHER SYMPTOMS: "Do you have any other symptoms?" (e g , leaking fluid from vagina, vaginal bleeding, fever, hand/facial swelling)      Clear discharge    Protocols used: PREGNANCY - LABOR-ADULT-

## 2022-12-21 NOTE — TELEPHONE ENCOUNTER
Regarding: Possible Labor  ----- Message from [facility] sent at 12/20/2022 11:54 PM EST -----  "I am 38 wks and I think I am in labor  I have been having contractions since 8pm  My toes are numb, I feel like I am going to pass out and vomit   I also have lots of back pain "

## 2022-12-21 NOTE — PROGRESS NOTES
L&D Triage Note - OB/GYN  Ila Head 32 y o  female MRN: [id number]  Unit/Bed#: LD TRIAGE 2 Encounter: [id number]      ASSESSMENT:    Ila Head is a 32 y o   at 38w1d presenting for lower back pain pain  No concerns for labor at this time  PLAN:    1) r/o labor  - SVE: 0 5/0-4  -Fetal heart tracing: Reactive, rate 130 bpm, moderate variability, 15 x 15 accelerations present, no decelerations  -Irritability noted on tocometer, no contractions while in triage  -Urine dipstick was unremarkable in triage  Denies any urinary symptoms  - Encouraged her to to use Tylenol, heating pad and lidocaine patches for her back pain along with getting plenty of hydration and rest  -Vital signs within normal limits   stable for discharge from triage  - She plans to schedule 39w IOL at her next appointment    Discharge Instructions:   Continue routine prenatal care  Discharge from [facility] triage with term labor precautions    - Reviewed rupture of membranes, false vs true labor, decreased fetal movement, and vaginal bleeding   - Pt to call provider with any concerns and follow up at her next scheduled prenatal appointment on 2022 at 4 PM with Dr Katie Villasenor  - Case discussed with Dr Celina Chavez:    Ila Head 32 y o  Reginerichy Baldwin at 38w1d with an Estimated Date of Delivery: 1/3/23     Presents to triage this morning for low back pain  Notes that she has been generally uncomfortable throughout this pregnancy with with low back pain on both sides since yesterday  She was seen in triage for same complaint several weeks ago  She states that she has not used any Tylenol for the pain "because she did not want to mask her symptoms before coming to the hospital " She denies any consistent contraction patterns, leakage of fluid, or vaginal bleeding  She reports good fetal movement  Reports chronic sciatic pain in this pregnancy      Denies any fever chills, nausea, vomiting, diarrhea, constipation, or any other complaints at this time    Her current obstetrical history is significant for recent exposure to Toxoplasmosis (labs to be collected as outpatient)     Her past obstetrical history is significant for 2 prior SAB, 1 prior Ectopic (1/2022 with Mirena IUD in place - received Methotrexate)    OBJECTIVE:    Vitals:    12/21/22 0121   BP: 107/57   Pulse: 99   Resp: 22   Temp: 98 °F (36 7 °C)   SpO2:        ROS:  Constitutional: Negative  Respiratory: Negative  Cardiovascular: Negative    Gastrointestinal: Negative    General Physical Exam:  General: in no apparent distress, non-toxic, alert and normal vitals  Cardiovascular: Cor RRR  Lungs: non-labored breathing  Abdomen: abdomen is soft without significant tenderness, masses, organomegaly or guarding  Back: Tenderness noted to bilateral lower lumbar region, no midline tenderness, no erythema or ecchymosis  Lower extremeties: nontender    Cervical Exam: Chaperone present   SVE: 0 5 / 0% / -4    Fetal monitoring:  FHT: Reactive, rate 130 bpm, moderate variability, 15 x 15 accelerations present, no decelerations  Ragan: contractions not present     Urine Dip    - Urine dipstick shows negative for all components        Eleni Quiroga DO  [place] PGY-1  12/21/2022 1:56 AM

## 2022-12-22 NOTE — TELEPHONE ENCOUNTER
Pt scheduled for 12/29 at 8pm  Patient aware via mychart, calender updated, pink alejandrina updated and provider aware

## 2022-12-23 ENCOUNTER — ROUTINE PRENATAL (OUTPATIENT)
Dept: OBGYN CLINIC | Facility: CLINIC | Age: 27
End: 2022-12-23

## 2022-12-23 VITALS — SYSTOLIC BLOOD PRESSURE: 124 MMHG | DIASTOLIC BLOOD PRESSURE: 78 MMHG | WEIGHT: 184 LBS | BODY MASS INDEX: 30.62 KG/M2

## 2022-12-23 DIAGNOSIS — Z3A.38 38 WEEKS GESTATION OF PREGNANCY: Primary | ICD-10-CM

## 2022-12-23 DIAGNOSIS — Z34.93 PRENATAL CARE IN THIRD TRIMESTER: ICD-10-CM

## 2022-12-23 NOTE — Clinical Note
IOL scheduled for 12/29 She was told 12/27--anything available for 12/27? Please call her to review Thanks!

## 2022-12-23 NOTE — PROGRESS NOTES
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

## 2022-12-24 ENCOUNTER — HOSPITAL ENCOUNTER (OUTPATIENT)
Facility: HOSPITAL | Age: 27
Discharge: HOME | End: 2022-12-24
Attending: STUDENT IN AN ORGANIZED HEALTH CARE EDUCATION/TRAINING PROGRAM | Admitting: STUDENT IN AN ORGANIZED HEALTH CARE EDUCATION/TRAINING PROGRAM

## 2022-12-24 ENCOUNTER — ANESTHESIA EVENT (INPATIENT)
Dept: ANESTHESIOLOGY | Facility: HOSPITAL | Age: 27
End: 2022-12-24

## 2022-12-24 ENCOUNTER — ANESTHESIA (INPATIENT)
Dept: ANESTHESIOLOGY | Facility: HOSPITAL | Age: 27
End: 2022-12-24

## 2022-12-24 ENCOUNTER — NURSE TRIAGE (OUTPATIENT)
Dept: OTHER | Facility: OTHER | Age: 27
End: 2022-12-24

## 2022-12-24 VITALS
OXYGEN SATURATION: 98 % | HEART RATE: 96 BPM | TEMPERATURE: 98 F | DIASTOLIC BLOOD PRESSURE: 62 MMHG | RESPIRATION RATE: 18 BRPM | SYSTOLIC BLOOD PRESSURE: 111 MMHG

## 2022-12-25 NOTE — PROCEDURES
Willi Barnes        , a [id number] at 38w5d with an JAMSHID of 1/3/2023, by Last Menstrual Period, was seen at [address] for the following procedure(s): $Procedure Type: CARA]         4 Quadrant CARA  CARA Q1 (cm): 2 9 cm  CARA Q2 (cm): 2 9 cm  CARA Q3 (cm): 2 cm  CARA Q4 (cm): 4 cm  CARA TOTAL (cm): 11 8 cm

## 2022-12-25 NOTE — TELEPHONE ENCOUNTER
Regardin Weeks pregnant having decreased fetal movement  ----- Message from Melvi Weathers sent at 2022  9:16 PM EST -----  '' 39 weeks pregnant having decreased fetal movement concerned ''

## 2022-12-25 NOTE — PROGRESS NOTES
L&D Triage Note - OB/GYN  Alex Barriga 32 y o  female MRN: [id number]  Unit/Bed#: LD TRIAGE 3 Encounter: [id number]      ASSESSMENT:    Alex Barriga is a 32 y o   at 38w5d presenting to triage with DFM  CARA 11 8 cm, movement visualized, NST reactive  Appropriate for discharge    PLAN:    1) DFM  -Movement seen on ultrasound  -CARA 11 8cm  -NST reactive, toco with intermittent contractions  2) Continue routine prenatal care  3) Discharge from [facility] triage with term labor precautions    - Reviewed rupture of membranes, false vs true labor, decreased fetal movement, and vaginal bleeding   - Pt to call provider with any concerns and follow up at her next scheduled prenatal appointment    - Case discussed with Dr Mickie Kern:    Alex Barriga 32 y o  Karole Patient at 38w5d with an Estimated Date of Delivery: 1/3/23 presenting to triage with complaint of DFM; she only felt 2 movements since 6PM and normally feels much more frequent movement  She thinks it may be related to increased activity yesterday      Her current obstetrical history is significant for none    Her past obstetrical history is significant for 1 ectopic, 2 SABs    Contractions: none  Leakage of fluid: none  Vaginal Bleeding: none  Fetal movement: decreased    OBJECTIVE:    Vitals:    22 2256   BP: 111/62   Pulse: 96   Resp: 18   Temp:    SpO2: 98%       ROS:  Constitutional: Negative  Respiratory: Negative  Cardiovascular: Negative    Gastrointestinal: Negative    General Physical Exam:  General: in no apparent distress  Cardiovascular: No murmurs  Lungs: non-labored breathing  Abdomen: abdomen is soft without significant tenderness, masses, organomegaly or guarding  Lower extremeties: nontender    Cervical Exam    SVE: declined       FHT:  Baseline Rate: 125 bpm  Variability: Moderate 6-25 bpm  Accelerations: 15 x 15 or greater  Decelerations: None  FHR Category: Category I    TOCO:   Contraction Frequency (minutes): x1  Contraction Duration (seconds): 120  Contraction Quality: Mild (Pt unaware of contraction)    Lab Results   Component Value Date    WBC 9 96 10/31/2022    HGB 10 9 (L) 10/31/2022    HCT 34 5 (L) 10/31/2022     10/31/2022     Lab Results   Component Value Date    K 3 7 05/19/2022     05/19/2022    CO2 26 05/19/2022    BUN 10 05/19/2022    CREATININE 0 64 05/19/2022    AST 13 05/19/2022    ALT 5 (L) 05/19/2022       Imaging:        Abd  US   CARA      - Q1 2 9cm     - Q2 2 9cm     - Q3 2cm     - Q4 4cm     - Total: 11 8cm      Stefan Miranda MD,  OBGYN PGY-1  12/25/2022 1:04 AM

## 2022-12-25 NOTE — TELEPHONE ENCOUNTER
38w4d, JAMSHID 1/3  Denies any vaginal bleeding or leaking of fluid  Also denies any abdominal pain or cramping  However does report decreased fetal movement  She reports attempting a kick count and only getting one movement within 2 hours  On call provider notified    Reason for Disposition  • [1] Pregnant 23 or more weeks AND [2] baby moving less today by kick count  (e g , kick count < 5 in 1 hour or < 10 in 2 hours)    Answer Assessment - Initial Assessment Questions  1  FETAL MOVEMENT: "Has the baby's movement decreased or changed significantly from normal?" (e g , yes, no; describe)      Decreased   2  JAMSHID: "What date are you expecting to deliver?"       1/3  3  PREGNANCY: "How many weeks pregnant are you?"       38w4d  4   OTHER SYMPTOMS: "Do you have any other symptoms?" (e g , abdominal pain, leaking fluid from vagina, vaginal bleeding, etc )      denies    Protocols used: PREGNANCY - DECREASED FETAL MOVEMENT-ADULT-AH

## 2022-12-25 NOTE — PROGRESS NOTES
Pt Discharged home to self care    AVS given and explained by MD   All questions answered at this time

## 2022-12-27 ENCOUNTER — HOSPITAL ENCOUNTER (INPATIENT)
Facility: HOSPITAL | Age: 27
LOS: 3 days | Discharge: HOME | End: 2022-12-30
Attending: STUDENT IN AN ORGANIZED HEALTH CARE EDUCATION/TRAINING PROGRAM | Admitting: STUDENT IN AN ORGANIZED HEALTH CARE EDUCATION/TRAINING PROGRAM

## 2022-12-27 ENCOUNTER — TELEPHONE (OUTPATIENT)
Dept: OBGYN CLINIC | Facility: CLINIC | Age: 27
End: 2022-12-27

## 2022-12-27 DIAGNOSIS — Z3A.38 38 WEEKS GESTATION OF PREGNANCY: ICD-10-CM

## 2022-12-27 LAB
ABO GROUP BLD: NORMAL
BLD GP AB SCN SERPL QL: NEGATIVE
ERYTHROCYTE [DISTWIDTH] IN BLOOD BY AUTOMATED COUNT: 12.9 % (ref 11.6–15.1)
HCT VFR BLD AUTO: 34.9 % (ref 34.8–46.1)
HGB BLD-MCNC: 11.5 G/DL (ref 11.5–15.4)
MCH RBC QN AUTO: 28.3 PG (ref 26.8–34.3)
MCHC RBC AUTO-ENTMCNC: 33 G/DL (ref 31.4–37.4)
MCV RBC AUTO: 86 FL (ref 82–98)
PLATELET # BLD AUTO: 327 THOUSANDS/UL (ref 149–390)
PMV BLD AUTO: 9.7 FL (ref 8.9–12.7)
RBC # BLD AUTO: 4.07 MILLION/UL (ref 3.81–5.12)
RH BLD: POSITIVE
SPECIMEN EXPIRATION DATE: NORMAL
WBC # BLD AUTO: 10.1 THOUSAND/UL (ref 4.31–10.16)

## 2022-12-27 PROCEDURE — 3E033VJ INTRODUCTION OF OTHER HORMONE INTO PERIPHERAL VEIN, PERCUTANEOUS APPROACH: ICD-10-PCS | Performed by: OBSTETRICS & GYNECOLOGY

## 2022-12-27 PROCEDURE — 4A1HXCZ MONITORING OF PRODUCTS OF CONCEPTION, CARDIAC RATE, EXTERNAL APPROACH: ICD-10-PCS | Performed by: OBSTETRICS & GYNECOLOGY

## 2022-12-27 PROCEDURE — 3E0P7VZ INTRODUCTION OF HORMONE INTO FEMALE REPRODUCTIVE, VIA NATURAL OR ARTIFICIAL OPENING: ICD-10-PCS | Performed by: OBSTETRICS & GYNECOLOGY

## 2022-12-27 PROCEDURE — 10907ZC DRAINAGE OF AMNIOTIC FLUID, THERAPEUTIC FROM PRODUCTS OF CONCEPTION, VIA NATURAL OR ARTIFICIAL OPENING: ICD-10-PCS | Performed by: OBSTETRICS & GYNECOLOGY

## 2022-12-27 RX ORDER — BUTORPHANOL TARTRATE 1 MG/ML
1 INJECTION, SOLUTION INTRAMUSCULAR; INTRAVENOUS
Status: DISCONTINUED | OUTPATIENT
Start: 2022-12-27 | End: 2022-12-30 | Stop reason: HOSPADM

## 2022-12-27 RX ORDER — LIDOCAINE HYDROCHLORIDE AND EPINEPHRINE 15; 5 MG/ML; UG/ML
INJECTION, SOLUTION EPIDURAL AS NEEDED
Status: DISCONTINUED | OUTPATIENT
Start: 2022-12-27 | End: 2022-12-28 | Stop reason: HOSPADM

## 2022-12-27 RX ORDER — PROMETHAZINE HYDROCHLORIDE 25 MG/ML
25 INJECTION, SOLUTION INTRAMUSCULAR; INTRAVENOUS EVERY 6 HOURS PRN
Status: DISCONTINUED | OUTPATIENT
Start: 2022-12-27 | End: 2022-12-30 | Stop reason: HOSPADM

## 2022-12-27 RX ORDER — OXYTOCIN/RINGER'S LACTATE 30/500 ML
1-30 PLASTIC BAG, INJECTION (ML) INTRAVENOUS
Status: DISCONTINUED | OUTPATIENT
Start: 2022-12-27 | End: 2022-12-28

## 2022-12-27 RX ORDER — ONDANSETRON 2 MG/ML
4 INJECTION INTRAMUSCULAR; INTRAVENOUS EVERY 4 HOURS PRN
Status: DISCONTINUED | OUTPATIENT
Start: 2022-12-27 | End: 2022-12-28

## 2022-12-27 RX ORDER — SODIUM CHLORIDE, SODIUM LACTATE, POTASSIUM CHLORIDE, CALCIUM CHLORIDE 600; 310; 30; 20 MG/100ML; MG/100ML; MG/100ML; MG/100ML
125 INJECTION, SOLUTION INTRAVENOUS CONTINUOUS
Status: DISCONTINUED | OUTPATIENT
Start: 2022-12-27 | End: 2022-12-30 | Stop reason: HOSPADM

## 2022-12-27 RX ADMIN — PROMETHAZINE HYDROCHLORIDE 25 MG: 25 INJECTION INTRAMUSCULAR; INTRAVENOUS at 16:42

## 2022-12-27 RX ADMIN — BUTORPHANOL TARTRATE 1 MG: 1 INJECTION, SOLUTION INTRAMUSCULAR; INTRAVENOUS at 16:41

## 2022-12-27 RX ADMIN — ROPIVACAINE HYDROCHLORIDE: 2 INJECTION, SOLUTION EPIDURAL; INFILTRATION at 21:40

## 2022-12-27 RX ADMIN — SODIUM CHLORIDE, POTASSIUM CHLORIDE, SODIUM LACTATE AND CALCIUM CHLORIDE 500 ML/HR: 600; 310; 30; 20 INJECTION, SOLUTION INTRAVENOUS at 23:09

## 2022-12-27 RX ADMIN — SODIUM CHLORIDE, POTASSIUM CHLORIDE, SODIUM LACTATE AND CALCIUM CHLORIDE 125 ML/HR: 600; 310; 30; 20 INJECTION, SOLUTION INTRAVENOUS at 14:00

## 2022-12-27 RX ADMIN — SODIUM CHLORIDE, POTASSIUM CHLORIDE, SODIUM LACTATE AND CALCIUM CHLORIDE 125 ML/HR: 600; 310; 30; 20 INJECTION, SOLUTION INTRAVENOUS at 20:26

## 2022-12-27 RX ADMIN — LIDOCAINE HYDROCHLORIDE AND EPINEPHRINE 5 ML: 15; 5 INJECTION, SOLUTION EPIDURAL at 21:37

## 2022-12-27 RX ADMIN — Medication 2 MILLI-UNITS/MIN: at 15:22

## 2022-12-27 NOTE — H&P
8 [facility] 32 y o  female MRN: [id number]  Unit/Bed#: LD TRIAGE  Encounter: [id number]    Assessment: 32 y o   at 39w0d admitted for IOL for persistent DFM  SVE: 1/30/-3  FHT: Cat I  Clinical EFW: 7 ; Cephalic confirmed by ultrasound  GBS status: negative   Postpartum contraception plan: 6 week IUD    Plan:   Attention deficit disorder (ADD) in adult  Assessment & Plan  Not on medication    * 38 weeks gestation of pregnancy  Assessment & Plan  Admit   T&S, CBC, RPR  CLD  IV fluids  GBS prophylaxis is not needed   Induction with zavala balloon and pitocin when moved back to room        Discussed case and plan w/ Dr Mary Mancuso      Chief Complaint: decreased fetal movement    HPI: Georgia Valdovinos is a 32 y o  [id number] with an JAMSHID of 1/3/2023, by Last Menstrual Period at 39w0d who is being admitted for IOL for DFM  She complains of uterine contractions, occurring intermittently minutes, has no LOF, and reports no VB  She states there is decreased FM for the past several days  She was evaluated in triage on  with reactive NST and sufficient CARA and discharged with precautions  Since then, she has not had sufficient fetal movement  Patient states that she is very anxious; she cannot eat or sleep due to the fear that she is going to lose her baby  Patient Active Problem List   Diagnosis   • Anxiety state   • Attention deficit disorder (ADD) in adult   • Flexor carpi radialis tendinitis   • 38 weeks gestation of pregnancy       Baby complications/comments: none, DFM for past 3 days    Review of Systems   Constitutional: Negative for chills and fever  Respiratory: Negative for cough and shortness of breath  Cardiovascular: Negative for chest pain and leg swelling  Gastrointestinal: Negative for abdominal pain, nausea and vomiting  Genitourinary: Negative for dysuria, pelvic pain, urgency, vaginal bleeding and vaginal discharge     Neurological: Negative for dizziness, light-headedness and headaches  All other systems reviewed and are negative  OB Hx:  OB History    Para Term  AB Living   4 0 0 0 3 0   SAB IAB Ectopic Multiple Live Births   2 0 1 0 0      # Outcome Date GA Lbr Guevara/2nd Weight Sex Delivery Anes PTL Lv   4 Current            3 Ectopic 2022     ECTOPIC         Birth Comments: on Mirena IUD, given methotrexate   2 SAB 03/25/15           1 SAB 14               Past Medical Hx:  Past Medical History:   Diagnosis Date   • Acne    • Anxiety    • Carpal tunnel syndrome    • Closed nondisplaced fracture of middle phalanx of right ring finger 2018   • Concussion with loss of consciousness 2022       Past Surgical hx:  Past Surgical History:   Procedure Laterality Date   • COLONOSCOPY     • EXAMINATION UNDER ANESTHESIA N/A 2018    Procedure: EXAM UNDER ANESTHESIA (EUA)  WITH IUD REMOVAL;  Surgeon: Zunilda Middleton DO;  Location:  MAIN OR;  Service: Gynecology   • W[place] OPEN TX PHALANGEAL SHAFT FRACTURE PROX/MIDDLE EA Right 2018    Procedure: CLOSED REDUCTION PERCUTANEOUS PINNING P2 RIGHT RING FINGER;  Surgeon: Rob Jeff DO;  Location: [address] [place] [address];  Service: Orthopedics   • REMOVAL OF INTRAUTERINE DEVICE (IUD) N/A 2018    Procedure: REMOVAL OF INTRAUTERINE DEVICE (IUD); Surgeon: Zunilda Middleton DO;  Location: [facility];  Service: Gynecology   • RHINOPLASTY     • WISDOM TOOTH EXTRACTION         Social Hx:  Alcohol use: no  Tobacco use: no  Other substance use: no    Other: no    Allergies   Allergen Reactions   • Amoxicillin Anaphylaxis   • Zithromax [Azithromycin] Anaphylaxis   • Latex Rash       Medications Prior to Admission   Medication   • Multiple Vitamin (MULTI VITAMIN DAILY PO)       Objective:  Temp:  [98 4 °F (36 9 °C)] 98 4 °F (36 9 °C)  HR:  [110-115] 115  Resp:  [18] 18  BP: (121-122)/(65-67) 121/67  There is no height or weight on file to calculate BMI       Physical Exam:  Physical Exam  Constitutional:       Appearance: Normal appearance  Cardiovascular:      Rate and Rhythm: Normal rate and regular rhythm  Heart sounds: No murmur heard  No friction rub  No gallop  Pulmonary:      Effort: Pulmonary effort is normal  No respiratory distress  Breath sounds: No wheezing  Abdominal:      Palpations: Abdomen is soft  Tenderness: There is no abdominal tenderness  Musculoskeletal:         General: No swelling or tenderness  Neurological:      Mental Status: She is alert and oriented to person, place, and time  Skin:     General: Skin is warm and dry  Psychiatric:         Mood and Affect: Mood normal    Vitals reviewed  FHT:  Baseline Rate: 130 bpm  Variability: Moderate 6-25 bpm  Accelerations: 15 x 15 or greater, With fetal movment  Decelerations: None    TOCO:   Contraction Frequency (minutes): occ  Contraction Duration (seconds): 60-70  Contraction Quality: Mild    Lab Results   Component Value Date    WBC 9 96 10/31/2022    HGB 10 9 (L) 10/31/2022    HCT 34 5 (L) 10/31/2022     10/31/2022     Lab Results   Component Value Date    K 3 7 05/19/2022     05/19/2022    CO2 26 05/19/2022    BUN 10 05/19/2022    CREATININE 0 64 05/19/2022    AST 13 05/19/2022    ALT 5 (L) 05/19/2022     Prenatal Labs: Reviewed      Blood type: O Pos  Antibody: Neg  GBS: Neg  HIV: NonR  Rubella: Immune  VDRL/RPR: Non reactive  HBsAg: Negative  Chlamydia: Negative  Gonorrhea: Negative  Diabetes 1 hour screen: 66  Platelets: 335  Hgb: 10 9  >2 Midnights  INPATIENT     Signature/Title:  Baldemar Lesches, MD  Date: 12/27/2022  Time: 12:54 PM

## 2022-12-27 NOTE — OB LABOR/OXYTOCIN SAFETY PROGRESS
Labor Progress Note - Yeyo Carmen 32 y o  female MRN: [id number]    Unit/Bed#: -01 Encounter: [id number]       Contraction Frequency (minutes): irregular  Contraction Quality: Mild      Cervical Dilation: 1        Cervical Effacement: 30  Fetal Station: -3  Baseline Rate: 150 bpm  Fetal Heart Rate: 140 BPM  FHR Category: Category I               Vital Signs:   Vitals:    12/27/22 1357   BP: 113/67   Pulse: 101   Resp: 18   Temp: 98 1 °F (36 7 °C)   SpO2: 99%       Notes/comments: FHT Cat I  PROCEDURE:  ZAVALA BALLOON PLACEMENT    A 24F zavala with a 30cc balloon was selected, SVE was performed and cervix was located, zavala was introduced over sterile gloved hands  Balloon advanced through cervix beyond the internal cervical os  A small amount amount of sterile saline solution was instilled in the balloon to confirm placement  Placement was confirmed to be beyond the internal cervical os  A total of 60cc of sterile saline solution was placed into the balloon  Pt tolerated well  Instructions left with RN to place zavala to gravity with a 1L bag of IV fluid  Notify MD when zavala dislodged      MD Jesus Ovalle MD 12/27/2022 2:46 PM

## 2022-12-27 NOTE — PLAN OF CARE
Problem: PAIN - ADULT  Goal: Verbalizes/displays adequate comfort level or baseline comfort level  Description: Interventions:  - Encourage patient to monitor pain and request assistance  - Assess pain using appropriate pain scale  - Administer analgesics based on type and severity of pain and evaluate response  - Implement non-pharmacological measures as appropriate and evaluate response  - Consider cultural and social influences on pain and pain management  - Notify physician/advanced practitioner if interventions unsuccessful or patient reports new pain  Outcome: Progressing     Problem: INFECTION - ADULT  Goal: Absence or prevention of progression during hospitalization  Description: INTERVENTIONS:  - Assess and monitor for signs and symptoms of infection  - Monitor lab/diagnostic results  - Monitor all insertion sites, i e  indwelling lines, tubes, and drains  - Monitor endotracheal if appropriate and nasal secretions for changes in amount and color  - [place] appropriate cooling/warming therapies per order  - Administer medications as ordered  - Instruct and encourage patient and family to use good hand hygiene technique  - Identify and instruct in appropriate isolation precautions for identified infection/condition  Outcome: Progressing  Goal: Absence of fever/infection during neutropenic period  Description: INTERVENTIONS:  - Monitor WBC    Outcome: Progressing     Problem: SAFETY ADULT  Goal: Patient will remain free of falls  Description: INTERVENTIONS:  - Educate patient/family on patient safety including physical limitations  - Instruct patient to call for assistance with activity   - Consult OT/PT to assist with strengthening/mobility   - Keep Call bell within reach  - Keep bed low and locked with side rails adjusted as appropriate  - Keep care items and personal belongings within reach  - Initiate and maintain comfort rounds  - Make Fall Risk Sign visible to staff  - Offer Toileting every 2 Hours, in advance of need  - Initiate/Maintain bed alarm if needed  - Obtain necessary fall risk management equipment: no- slip socks  - Apply yellow socks and bracelet for high fall risk patients  - Consider moving patient to room near nurses station  Outcome: Progressing  Goal: Maintain or return to baseline ADL function  Description: INTERVENTIONS:  -  Assess patient's ability to carry out ADLs; assess patient's baseline for ADL function and identify physical deficits which impact ability to perform ADLs (bathing, care of mouth/teeth, toileting, grooming, dressing, etc )  - Assess/evaluate cause of self-care deficits   - Assess range of motion  - Assess patient's mobility; develop plan if impaired  - Assess patient's need for assistive devices and provide as appropriate  - Encourage maximum independence but intervene and supervise when necessary  - Involve family in performance of ADLs  - Assess for home care needs following discharge   - Consider OT consult to assist with ADL evaluation and planning for discharge  - Provide patient education as appropriate  Outcome: Progressing  Goal: Maintains/Returns to pre admission functional level  Description: INTERVENTIONS:  - Perform BMAT or MOVE assessment daily    - Set and communicate daily mobility goal to care team and patient/family/caregiver  - Collaborate with rehabilitation services on mobility goals if consulted  - Perform Range of Motion as needed  - Reposition patient as needed  - Dangle patient as needed  - Stand patient as needed  - Ambulate patient as needed  - Out of bed to chair as needed  - Out of bed for meals as needed    - Out of bed for toileting  - Record patient progress and toleration of activity level   Outcome: Progressing     Problem: Knowledge Deficit  Goal: Patient/family/caregiver demonstrates understanding of disease process, treatment plan, medications, and discharge instructions  Description: Complete learning assessment and assess knowledge base  Interventions:  - Provide teaching at level of understanding  - Provide teaching via preferred learning methods  Outcome: Progressing  Goal: Verbalizes understanding of labor plan  Description: Assess patient/family/caregiver's baseline knowledge level and ability to understand information  Provide education via patient/family/caregiver's preferred learning method at appropriate level of understanding  1  Provide teaching at level of understanding  2  Provide teaching via preferred learning method(s)    Outcome: Progressing     Problem: DISCHARGE PLANNING  Goal: Discharge to home or other facility with appropriate resources  Description: INTERVENTIONS:  - Identify barriers to discharge w/patient and caregiver  - Arrange for needed discharge resources and transportation as appropriate  - Identify discharge learning needs (meds, wound care, etc )  - Arrange for interpretive services to assist at discharge as needed  - Refer to Case Management Department for coordinating discharge planning if the patient needs post-hospital services based on physician/advanced practitioner order or complex needs related to functional status, cognitive ability, or social support system  Outcome: Progressing     Problem: BIRTH - VAGINAL/ SECTION  Goal: Fetal and maternal status remain reassuring during the birth process  Description: INTERVENTIONS:  - Monitor vital signs  - Monitor fetal heart rate  - Monitor uterine activity  - Monitor labor progression (vaginal delivery)  - DVT prophylaxis  - Antibiotic prophylaxis  Outcome: Progressing  Goal: Emotionally satisfying birthing experience for mother/fetus  Description: Interventions:  - Assess, plan, implement and evaluate the nursing care given to the patient in labor  - Advocate the philosophy that each childbirth experience is a unique experience and support the family's chosen level of involvement and control during the labor process   - Actively participate in both the patient's and family's teaching of the birth process  - Consider cultural, Gnosticist and age-specific factors and plan care for the patient in labor  Outcome: Progressing     Problem: Labor & Delivery  Goal: Manages discomfort  Description: Assess and monitor for signs and symptoms of discomfort  Assess patient's pain level regularly and per hospital policy  Administer medications as ordered  Support use of nonpharmacological methods to help control pain such as distraction, imagery, relaxation, and application of heat and cold  Collaborate with interdisciplinary team and patient to determine appropriate pain management plan  1  Include patient in decisions related to comfort  2  Offer non-pharmacological pain management interventions  3  Report ineffective pain management to physician  Outcome: Progressing  Goal: Patient vital signs are stable  Description: 1  Assess vital signs - vaginal delivery    Outcome: Progressing

## 2022-12-27 NOTE — TELEPHONE ENCOUNTER
Patient called because she was told by 2 providers that her induction was going to be scheduled for today  I told her that I tried to get it scheduled for her 1st choice but unable to accommodate due to scheduling availability at [facility]  I was only able to schedule on the 29th  Patient is very upset with our office and wants an induction today, told her I double checked with [facility] and [facility] and they still don't have anything available today  Patient stated " my baby isn't moving" patient sent to [facility] for evaluation  [facility] aware

## 2022-12-27 NOTE — ASSESSMENT & PLAN NOTE
Admit   T&S, CBC, RPR  CLD  IV fluids  GBS prophylaxis is not needed   Induction with zavala balloon and pitocin when moved back to room

## 2022-12-28 LAB
BASE EXCESS BLDCOA CALC-SCNC: -4.7 MMOL/L (ref 3–11)
BASE EXCESS BLDCOV CALC-SCNC: -3.4 MMOL/L (ref 1–9)
HCO3 BLDCOA-SCNC: 23.2 MMOL/L (ref 17.3–27.3)
HCO3 BLDCOV-SCNC: 20.5 MMOL/L (ref 12.2–28.6)
O2 CT VFR BLDCOA CALC: 9.4 ML/DL
OXYHGB MFR BLDCOA: 44.6 %
OXYHGB MFR BLDCOV: 60.3 %
PCO2 BLDCOA: 54.1 MM[HG] (ref 30–60)
PCO2 BLDCOV: 33.5 MM HG (ref 27–43)
PH BLDCOA: 7.25 [PH] (ref 7.23–7.43)
PH BLDCOV: 7.4 [PH] (ref 7.19–7.49)
PO2 BLDCOA: 21.2 MM HG (ref 5–25)
PO2 BLDCOV: 23.7 MM HG (ref 15–45)
RPR SER QL: NORMAL
SAO2 % BLDCOV: 12.1 ML/DL

## 2022-12-28 PROCEDURE — 0KQM0ZZ REPAIR PERINEUM MUSCLE, OPEN APPROACH: ICD-10-PCS | Performed by: OBSTETRICS & GYNECOLOGY

## 2022-12-28 PROCEDURE — 0UQMXZZ REPAIR VULVA, EXTERNAL APPROACH: ICD-10-PCS | Performed by: OBSTETRICS & GYNECOLOGY

## 2022-12-28 RX ORDER — OXYTOCIN/RINGER'S LACTATE 30/500 ML
250 PLASTIC BAG, INJECTION (ML) INTRAVENOUS ONCE
Status: COMPLETED | OUTPATIENT
Start: 2022-12-28 | End: 2022-12-28

## 2022-12-28 RX ORDER — CARBOPROST TROMETHAMINE 250 UG/ML
INJECTION, SOLUTION INTRAMUSCULAR
Status: DISPENSED
Start: 2022-12-28 | End: 2022-12-29

## 2022-12-28 RX ORDER — DOCUSATE SODIUM 100 MG/1
100 CAPSULE, LIQUID FILLED ORAL 2 TIMES DAILY
Status: DISCONTINUED | OUTPATIENT
Start: 2022-12-28 | End: 2022-12-30 | Stop reason: HOSPADM

## 2022-12-28 RX ORDER — CALCIUM CARBONATE 200(500)MG
1000 TABLET,CHEWABLE ORAL 3 TIMES DAILY PRN
Status: DISCONTINUED | OUTPATIENT
Start: 2022-12-28 | End: 2022-12-30 | Stop reason: HOSPADM

## 2022-12-28 RX ORDER — ACETAMINOPHEN 325 MG/1
650 TABLET ORAL EVERY 4 HOURS PRN
Status: DISCONTINUED | OUTPATIENT
Start: 2022-12-28 | End: 2022-12-30 | Stop reason: HOSPADM

## 2022-12-28 RX ORDER — ONDANSETRON 2 MG/ML
4 INJECTION INTRAMUSCULAR; INTRAVENOUS EVERY 8 HOURS PRN
Status: DISCONTINUED | OUTPATIENT
Start: 2022-12-28 | End: 2022-12-30 | Stop reason: HOSPADM

## 2022-12-28 RX ORDER — ACETAMINOPHEN 325 MG/1
650 TABLET ORAL EVERY 6 HOURS PRN
Status: DISCONTINUED | OUTPATIENT
Start: 2022-12-28 | End: 2022-12-28

## 2022-12-28 RX ORDER — IBUPROFEN 600 MG/1
600 TABLET ORAL EVERY 6 HOURS
Status: DISCONTINUED | OUTPATIENT
Start: 2022-12-28 | End: 2022-12-30 | Stop reason: HOSPADM

## 2022-12-28 RX ORDER — EPHEDRINE SULFATE 50 MG/ML
INJECTION INTRAVENOUS AS NEEDED
Status: DISCONTINUED | OUTPATIENT
Start: 2022-12-28 | End: 2022-12-28 | Stop reason: HOSPADM

## 2022-12-28 RX ORDER — METHYLERGONOVINE MALEATE 0.2 MG/ML
INJECTION INTRAVENOUS
Status: DISPENSED
Start: 2022-12-28 | End: 2022-12-29

## 2022-12-28 RX ORDER — ECHINACEA PURPUREA EXTRACT 125 MG
1 TABLET ORAL
Status: DISCONTINUED | OUTPATIENT
Start: 2022-12-28 | End: 2022-12-30 | Stop reason: HOSPADM

## 2022-12-28 RX ORDER — DIAPER,BRIEF,INFANT-TODD,DISP
1 EACH MISCELLANEOUS DAILY PRN
Status: DISCONTINUED | OUTPATIENT
Start: 2022-12-28 | End: 2022-12-30 | Stop reason: HOSPADM

## 2022-12-28 RX ORDER — HYDROXYZINE HYDROCHLORIDE 25 MG/1
25 TABLET, FILM COATED ORAL
Status: DISCONTINUED | OUTPATIENT
Start: 2022-12-28 | End: 2022-12-30 | Stop reason: HOSPADM

## 2022-12-28 RX ADMIN — ROPIVACAINE HYDROCHLORIDE: 2 INJECTION, SOLUTION EPIDURAL; INFILTRATION at 02:06

## 2022-12-28 RX ADMIN — HYDROXYZINE HYDROCHLORIDE 25 MG: 25 TABLET, FILM COATED ORAL at 11:33

## 2022-12-28 RX ADMIN — SODIUM CHLORIDE, POTASSIUM CHLORIDE, SODIUM LACTATE AND CALCIUM CHLORIDE 125 ML/HR: 600; 310; 30; 20 INJECTION, SOLUTION INTRAVENOUS at 02:25

## 2022-12-28 RX ADMIN — SODIUM CHLORIDE, POTASSIUM CHLORIDE, SODIUM LACTATE AND CALCIUM CHLORIDE 125 ML/HR: 600; 310; 30; 20 INJECTION, SOLUTION INTRAVENOUS at 07:53

## 2022-12-28 RX ADMIN — ROPIVACAINE HYDROCHLORIDE: 2 INJECTION, SOLUTION EPIDURAL; INFILTRATION at 06:19

## 2022-12-28 RX ADMIN — EPHEDRINE SULFATE 10 MG: 50 INJECTION, SOLUTION INTRAVENOUS at 02:07

## 2022-12-28 RX ADMIN — DOCUSATE SODIUM 100 MG: 100 CAPSULE, LIQUID FILLED ORAL at 17:20

## 2022-12-28 RX ADMIN — Medication 250 MILLI-UNITS/MIN: at 15:00

## 2022-12-28 RX ADMIN — IBUPROFEN 600 MG: 600 TABLET, FILM COATED ORAL at 15:31

## 2022-12-28 RX ADMIN — ACETAMINOPHEN 650 MG: 325 TABLET, FILM COATED ORAL at 07:20

## 2022-12-28 RX ADMIN — IBUPROFEN 600 MG: 600 TABLET, FILM COATED ORAL at 22:53

## 2022-12-28 RX ADMIN — SODIUM CHLORIDE, POTASSIUM CHLORIDE, SODIUM LACTATE AND CALCIUM CHLORIDE 500 ML/HR: 600; 310; 30; 20 INJECTION, SOLUTION INTRAVENOUS at 06:19

## 2022-12-28 RX ADMIN — SALINE NASAL SPRAY 1 SPRAY: 1.5 SOLUTION NASAL at 07:52

## 2022-12-28 NOTE — ANESTHESIA POSTPROCEDURE EVALUATION
Post-Op Assessment Note    CV Status:  Stable    Pain management: adequate     Mental Status:  Alert and awake   Hydration Status:  Euvolemic   PONV Controlled:  Controlled   Airway Patency:  Patent      Post Op Vitals Reviewed: Yes      Staff: CRNA     Post-op block assessment: no complications, site cleaned and catheter intact      No notable events documented      /68 (12/28/22 1545)    Temp      Pulse 90 (12/28/22 1545)   Resp 18 (12/28/22 1545)    SpO2

## 2022-12-28 NOTE — OB LABOR/OXYTOCIN SAFETY PROGRESS
Oxytocin Safety Progress Check Note - Xin Kaiser 32 y o  female MRN: [id number]    Unit/Bed#: -01 Encounter: [id number]    Dose (izzy-units/min) Oxytocin: 14 izzy-units/min  Contraction Frequency (minutes): 2-3  Contraction Quality: Mild  Tachysystole: No     Cervical Dilation: 5  Cervical Effacement: 70  Fetal Station: -3     Baseline Rate: 165 bpm  Fetal Heart Rate: 165 BPM  FHR Category: Category II    Pt feels some chills, otherwise denies pain  SVE as above  Fetal tachycardia noted, maternal HR 70-90s  Maternal temp 98 0; pt's partner reports that she tends to run in the high 96s  Pit at 15  Ngo catheter placed at this time  Plan for IV fluids, repositioning  Pit decreased from 14 to 6  Consider holding pit, consider AROM      Vital Signs:   Vitals:    12/27/22 2212   BP: 112/63   Pulse: 100   Resp:    Temp:    SpO2:      D/w Dr Feliberto Cruz MD 12/27/2022 10:31 PM

## 2022-12-28 NOTE — OB LABOR/OXYTOCIN SAFETY PROGRESS
Oxytocin Safety Progress Check Note - Joce Bernal 32 y o  female MRN: [id number]    Unit/Bed#: -01 Encounter: [id number]    Dose (izzy-units/min) Oxytocin: 18 izzy-units/min  Contraction Frequency (minutes): 1 5-3  Contraction Quality: Mild  Tachysystole: No   Cervical Dilation: 6        Cervical Effacement: 80  Fetal Station: -2  Baseline Rate: 155 bpm  Fetal Heart Rate: 160 BPM  FHR Category: Category I               Vital Signs:   Vitals:    12/28/22 0558   BP: 105/57   Pulse: 95   Resp:    Temp:    SpO2:        Notes/comments:   SVE as above  FHT Cat 2 with baseline in 190s with moderate variability  Maternal Temp 98F at this time     Fluid bolus and decreased pitocin to half    D/w Dr Shaun White,  12/28/2022 6:08 AM

## 2022-12-28 NOTE — OB LABOR/OXYTOCIN SAFETY PROGRESS
Labor Progress Note - Joce Bernal 32 y o  female MRN: [id number]    Unit/Bed#: -01 Encounter: [id number]    Dose (izzy-units/min) Oxytocin: 0 izzy-units/min (per dr Lucia Silva order)  Contraction Frequency (minutes): 1 5-4  Contraction Quality: Mild  Tachysystole: No   Cervical Dilation: 6  Cervical Effacement: 80  Fetal Station: -2  Baseline Rate: 170 bpm  Fetal Heart Rate: 170 BPM  FHR Category: Category II     Vital Signs:   Vitals:    12/28/22 0702   BP:    Pulse:    Resp: 20   Temp:    SpO2:        Notes/comments:   Fetal tachycardia noted again  Maternal temp 98 4 F, was previously 100 1 and no maternal tachycardia noted  Pitocin discontinued at this time- IVF bolus and tylenol to be given and plan to monitor closely for improvement prior to restarting pitocin      Brenda Hayden MD 12/28/2022 7:13 AM

## 2022-12-28 NOTE — ANESTHESIA PREPROCEDURE EVALUATION
Procedure:  LABOR ANALGESIA    Relevant Problems   ANESTHESIA (within normal limits)      GYN   (+) 38 weeks gestation of pregnancy      NEURO/PSYCH   (+) Anxiety state        Physical Exam    Airway    Mallampati score: II  TM Distance: >3 FB  Neck ROM: full     Dental       Cardiovascular  Rate: normal,     Pulmonary  Pulmonary exam normal     Other Findings        Anesthesia Plan  ASA Score- 2     Anesthesia Type- epidural with ASA Monitors  Additional Monitors:   Airway Plan:           Plan Factors-Exercise tolerance (METS): >4 METS  Chart reviewed  Existing labs reviewed  Patient summary reviewed  Patient is not a current smoker  Induction-     Postoperative Plan-     Informed Consent- Anesthetic plan and risks discussed with patient  I personally reviewed this patient with the CRNA  Discussed and agreed on the Anesthesia Plan with the CRNA  Frutoso Spatz

## 2022-12-28 NOTE — PLAN OF CARE
Problem: PAIN - ADULT  Goal: Verbalizes/displays adequate comfort level or baseline comfort level  Description: Interventions:  - Encourage patient to monitor pain and request assistance  - Assess pain using appropriate pain scale  - Administer analgesics based on type and severity of pain and evaluate response  - Implement non-pharmacological measures as appropriate and evaluate response  - Consider cultural and social influences on pain and pain management  - Notify physician/advanced practitioner if interventions unsuccessful or patient reports new pain  12/28/2022 1640 by Abran Huggins RN  Outcome: Progressing  12/28/2022 0703 by Abran Huggins RN  Outcome: Progressing     Problem: INFECTION - ADULT  Goal: Absence or prevention of progression during hospitalization  Description: INTERVENTIONS:  - Assess and monitor for signs and symptoms of infection  - Monitor lab/diagnostic results  - Monitor all insertion sites, i e  indwelling lines, tubes, and drains  - Monitor endotracheal if appropriate and nasal secretions for changes in amount and color  - [place] appropriate cooling/warming therapies per order  - Administer medications as ordered  - Instruct and encourage patient and family to use good hand hygiene technique  - Identify and instruct in appropriate isolation precautions for identified infection/condition  12/28/2022 1640 by Abran Huggins RN  Outcome: Progressing  12/28/2022 0703 by Abran Huggins RN  Outcome: Progressing  Goal: Absence of fever/infection during neutropenic period  Description: INTERVENTIONS:  - Monitor WBC    12/28/2022 1640 by Abran Huggins RN  Outcome: Progressing  12/28/2022 0703 by Abran Huggins RN  Outcome: Progressing     Problem: SAFETY ADULT  Goal: Patient will remain free of falls  Description: INTERVENTIONS:  - Educate patient/family on patient safety including physical limitations  - Instruct patient to call for assistance with activity   - Consult OT/PT to assist with strengthening/mobility   - Keep Call bell within reach  - Keep bed low and locked with side rails adjusted as appropriate  - Keep care items and personal belongings within reach  - Initiate and maintain comfort rounds  - Make Fall Risk Sign visible to staff  - Offer Toileting every  Hours, in advance of need  - Initiate/Maintain alarm  - Obtain necessary fall risk management equipment:   - Apply yellow socks and bracelet for high fall risk patients  - Consider moving patient to room near nurses station  12/28/2022 1640 by Deshawn Golden RN  Outcome: Progressing  12/28/2022 0703 by Deshawn Golden RN  Outcome: Progressing  Goal: Maintain or return to baseline ADL function  Description: INTERVENTIONS:  -  Assess patient's ability to carry out ADLs; assess patient's baseline for ADL function and identify physical deficits which impact ability to perform ADLs (bathing, care of mouth/teeth, toileting, grooming, dressing, etc )  - Assess/evaluate cause of self-care deficits   - Assess range of motion  - Assess patient's mobility; develop plan if impaired  - Assess patient's need for assistive devices and provide as appropriate  - Encourage maximum independence but intervene and supervise when necessary  - Involve family in performance of ADLs  - Assess for home care needs following discharge   - Consider OT consult to assist with ADL evaluation and planning for discharge  - Provide patient education as appropriate  12/28/2022 1640 by Deshawn Golden RN  Outcome: Progressing  12/28/2022 0703 by eDshawn Golden RN  Outcome: Progressing  Goal: Maintains/Returns to pre admission functional level  Description: INTERVENTIONS:  - Perform BMAT or MOVE assessment daily    - Set and communicate daily mobility goal to care team and patient/family/caregiver  - Collaborate with rehabilitation services on mobility goals if consulted  - Perform Range of Motion  times a day  - Reposition patient every  hours    - Dangle patient  times a day  - Stand patient  times a day  - Ambulate patient  times a day  - Out of bed to chair  times a day   - Out of bed for meals  times a day  - Out of bed for toileting  - Record patient progress and toleration of activity level   2022 by Beverley Banegas RN  Outcome: Progressing  2022 by Beverley Banegas RN  Outcome: Progressing     Problem: DISCHARGE PLANNING  Goal: Discharge to home or other facility with appropriate resources  Description: INTERVENTIONS:  - Identify barriers to discharge w/patient and caregiver  - Arrange for needed discharge resources and transportation as appropriate  - Identify discharge learning needs (meds, wound care, etc )  - Arrange for interpretive services to assist at discharge as needed  - Refer to Case Management Department for coordinating discharge planning if the patient needs post-hospital services based on physician/advanced practitioner order or complex needs related to functional status, cognitive ability, or social support system  2022 by Beverley Banegas RN  Outcome: Progressing  2022 by Beverley Banegas RN  Outcome: Progressing     Problem: POSTPARTUM  Goal: Experiences normal postpartum course  Description: INTERVENTIONS:  - Monitor maternal vital signs  - Assess uterine involution and lochia  Outcome: Progressing  Goal: Appropriate maternal -  bonding  Description: INTERVENTIONS:  - Identify family support  - Assess for appropriate maternal/infant bonding   -Encourage maternal/infant bonding opportunities  - Referral to  or  as needed  Outcome: Progressing  Goal: Establishment of infant feeding pattern  Description: INTERVENTIONS:  - Assess breast/bottle feeding  - Refer to lactation as needed  Outcome: Progressing  Goal: Incision(s), wounds(s) or drain site(s) healing without S/S of infection  Description: INTERVENTIONS  - Assess and document dressing, incision, wound bed, drain sites and surrounding tissue  - Provide patient and family education  - Perform skin care/dressing changes every   Outcome: Progressing     Problem: Knowledge Deficit  Goal: Patient/family/caregiver demonstrates understanding of disease process, treatment plan, medications, and discharge instructions  Description: Complete learning assessment and assess knowledge base  Interventions:  - Provide teaching at level of understanding  - Provide teaching via preferred learning methods  2022 by Andrew Gill RN  Outcome: Completed  2022 by Andrew Gill RN  Outcome: Progressing  Goal: Verbalizes understanding of labor plan  Description: Assess patient/family/caregiver's baseline knowledge level and ability to understand information  Provide education via patient/family/caregiver's preferred learning method at appropriate level of understanding  1  Provide teaching at level of understanding  2  Provide teaching via preferred learning method(s)    2022 by Andrew Gill RN  Outcome: Completed  2022 by Andrew Gill RN  Outcome: Progressing     Problem: BIRTH - VAGINAL/ SECTION  Goal: Fetal and maternal status remain reassuring during the birth process  Description: INTERVENTIONS:  - Monitor vital signs  - Monitor fetal heart rate  - Monitor uterine activity  - Monitor labor progression (vaginal delivery)  - DVT prophylaxis  - Antibiotic prophylaxis  2022 by Andrew Gill RN  Outcome: Completed  2022 by Andrew Gill RN  Outcome: Progressing  Goal: Emotionally satisfying birthing experience for mother/fetus  Description: Interventions:  - Assess, plan, implement and evaluate the nursing care given to the patient in labor  - Advocate the philosophy that each childbirth experience is a unique experience and support the family's chosen level of involvement and control during the labor process   - Actively participate in both the patient's and family's teaching of the birth process  - Consider cultural, Congregation and age-specific factors and plan care for the patient in labor  12/28/2022 1640 by Harrison Rios RN  Outcome: Completed  12/28/2022 0703 by Harrison Rios RN  Outcome: Progressing     Problem: Labor & Delivery  Goal: Manages discomfort  Description: Assess and monitor for signs and symptoms of discomfort  Assess patient's pain level regularly and per hospital policy  Administer medications as ordered  Support use of nonpharmacological methods to help control pain such as distraction, imagery, relaxation, and application of heat and cold  Collaborate with interdisciplinary team and patient to determine appropriate pain management plan  1  Include patient in decisions related to comfort  2  Offer non-pharmacological pain management interventions  3  Report ineffective pain management to physician  12/28/2022 1640 by Harrison Rios RN  Outcome: Completed  12/28/2022 0703 by Harrison Rios RN  Outcome: Progressing  Goal: Patient vital signs are stable  Description: 1  Assess vital signs - vaginal delivery    12/28/2022 1640 by Harrison Rios RN  Outcome: Completed  12/28/2022 0703 by Harrison Rios RN  Outcome: Progressing

## 2022-12-28 NOTE — ANESTHESIA PROCEDURE NOTES
Epidural Block    Patient location during procedure: OB  Start time: 12/27/2022 9:37 PM  Reason for block: procedure for pain, at surgeon's request, post-op pain management and primary anesthetic  Staffing  Performed: Anesthesiologist   Anesthesiologist: Nuvia Barr MD  Preanesthetic Checklist  Completed: patient identified, IV checked, site marked, risks and benefits discussed, surgical consent, monitors and equipment checked, pre-op evaluation and timeout performed  Epidural  Patient position: sitting  Prep: ChloraPrep  Patient monitoring: cardiac monitor and frequent blood pressure checks  Approach: midline  Location: lumbar  Injection technique: RHONDA saline  Needle  Needle type: Tuohy   Needle gauge: 18 G  Catheter type: side hole  Catheter size: 20 G  Catheter at skin depth: 12 cm  Catheter securement method: clear occlusive dressing  Test dose: negative  Assessment  Number of attempts: 1negative aspiration for CSF, negative aspiration for heme and no paresthesia on injection  patient tolerated the procedure well with no immediate complications  Additional Notes  Single skin puncture and needle pass  RHONDA saline @ 6 cm  Catheter threaded to 18cm w/o paresthesias  Final position 12 at skin  Aspirated thru catheter neg for heme/csf; td negative

## 2022-12-28 NOTE — DISCHARGE SUMMARY
Discharge Summary - OB/GYN   Carlos Benz 32 y o  female MRN: [id number]  Unit/Bed#: -01 Encounter: [id number]      Admission Date: 2022     Discharge Date: ***    Admitting Diagnosis:   1  Pregnancy at 39w1d  2  ***    Discharge Diagnosis:   Same, delivered  ***    Procedures: {delivery outcome:43704}    Delivering Attending: Isaiah Mendez MD*  Discharge Attending: [facility] Course:     Carlos Benz is a 32 y o  G***P*** at 39w1d wks who was initially admitted for ***  She delivered a viable {Desc; male/female:91837}  on *** at ***  Weight ***lbs ***oz via {delivery outcome:21110}  Apgars were *** (1 min) and *** (5 min)   was transferred to *** nursery  Patient tolerated the procedure well and was transferred to recovery in stable condition  Her post-partum course was complicated by ***  Her post-partum pain was well controlled with oral analgesics  On day of discharge, she was ambulating and able to reasonably perform all ADLs  She was voiding and had appropriate bowel function  Pain was well controlled  She was discharged home on post-partum day #*** without complications  Patient was instructed to follow up with her OB as an outpatient and was given appropriate warnings to call provider if she develops signs of infection or uncontrolled pain  Complications: none apparent    Condition at discharge: {condition:34386}     Discharge instructions/Information to patient and family:   See after visit summary for information provided to patient and family  Provisions for Follow-Up Care:  See after visit summary for information related to follow-up care and any pertinent home health orders  Disposition: {Discharge Disposition:94466}    Planned Readmission: {EXAM; YES/NO:10877::"No"}    Discharge Medications: For a complete list of the patient's medications, please refer to her med rec

## 2022-12-28 NOTE — DISCHARGE SUMMARY
Discharge Summary - Joce Bernal 32 y o  female MRN: [id number]    Unit/Bed#: -01 Encounter: [id number]    Admission Date: 2022     Discharge Date: 22    Admitting Diagnosis:   Patient Active Problem List   Diagnosis   • Anxiety state   • Attention deficit disorder (ADD) in adult   • Flexor carpi radialis tendinitis   • 38 weeks gestation of pregnancy     Discharge Diagnosis:   Same, delivered    Procedures:   spontaneous vaginal delivery    Admitting Attending: Dr Oscar Dominguez Attending: Dr Wing Ayers  Discharge Attending: Dr Kaitlynn Tong Course:     Joce Bernal is a 32 y o  [id number] who was admitted at 39w0d due to concern for decreased fetal movement at term  Cervical ripening was performed with zavala balloon and pitocin  After zavala balloon expulsion, she was found to be 3cm  She progressed to 5cm and membranes were artificially ruptured for clear fluid  She was noted to have intermittent fetal tachycardia but was afebrile  Pitocin was decreased until fetal heart tones were appropriate  She progressed to complete and began pushing  She then underwent an uncomplicated spontaneous vaginal delivery and delivered a viable female  on 22 at 1335  APGARS were 9, 9 at 1 and 5 minutes, respectively   weighed 8lb 4 8oz   was then transferred to  nursery  Patient tolerated the procedure well and was transferred to postpartum in stable condition  The patient's post partum course was unremarkable  On day of discharge, she was ambulating and able to reasonably perform all ADLs  She was voiding and had appropriate bowel function  Pain was well controlled  She was discharged home on postpartum day #2 without complications  Patient was instructed to follow up with her OB as an outpatient and was given appropriate warnings to call provider if she develops signs of infection or uncontrolled pain      Condition at discharge:   good     Disposition: Home    Planned Readmission:   No    Discharge Medications:   Prenatal vitamin daily for 6 months or the duration of nursing whichever is longer  Motrin 600 mg orally every 6 hours as needed for pain  Tylenol (over the counter) per bottle directions as needed for pain  Hydrocortisone cream 1% (over the counter) applied 1-2x daily to hemorrhoids as needed  Witch hazel pads for hemorrhoidal discomfort as needed      Discharge instructions :   -Do not place anything (no partner, tampons or douche) in your vagina for 6 weeks  -You may walk for exercise for the first 6 weeks then gradually return to your usual activities    -Please do not drive for 1 week if you have no stitches and for 2 weeks if you have stitches or underwent a  delivery     -You may take baths or shower per your preference    -Please look at your bust (breasts) in the mirror daily and call provider for redness or tenderness or increased warmth  - If you have had a  please look at your incision daily as well and call provider for increasing redness or steady drainage from the incision    -Please call your provider if temperature > 100 4*F or 38* C, worsening pain or a foul discharge

## 2022-12-28 NOTE — PLAN OF CARE
Problem: PAIN - ADULT  Goal: Verbalizes/displays adequate comfort level or baseline comfort level  Description: Interventions:  - Encourage patient to monitor pain and request assistance  - Assess pain using appropriate pain scale  - Administer analgesics based on type and severity of pain and evaluate response  - Implement non-pharmacological measures as appropriate and evaluate response  - Consider cultural and social influences on pain and pain management  - Notify physician/advanced practitioner if interventions unsuccessful or patient reports new pain  Outcome: Progressing     Problem: INFECTION - ADULT  Goal: Absence or prevention of progression during hospitalization  Description: INTERVENTIONS:  - Assess and monitor for signs and symptoms of infection  - Monitor lab/diagnostic results  - Monitor all insertion sites, i e  indwelling lines, tubes, and drains  - Monitor endotracheal if appropriate and nasal secretions for changes in amount and color  - [place] appropriate cooling/warming therapies per order  - Administer medications as ordered  - Instruct and encourage patient and family to use good hand hygiene technique  - Identify and instruct in appropriate isolation precautions for identified infection/condition  Outcome: Progressing  Goal: Absence of fever/infection during neutropenic period  Description: INTERVENTIONS:  - Monitor WBC    Outcome: Progressing     Problem: SAFETY ADULT  Goal: Patient will remain free of falls  Description: INTERVENTIONS:  - Educate patient/family on patient safety including physical limitations  - Instruct patient to call for assistance with activity   - Consult OT/PT to assist with strengthening/mobility   - Keep Call bell within reach  - Keep bed low and locked with side rails adjusted as appropriate  - Keep care items and personal belongings within reach  - Initiate and maintain comfort rounds  - Make Fall Risk Sign visible to staff  - Offer Toileting every  Hours, in advance of need  - Initiate/Maintain alarm  - Obtain necessary fall risk management equipment:   - Apply yellow socks and bracelet for high fall risk patients  - Consider moving patient to room near nurses station  Outcome: Progressing  Goal: Maintain or return to baseline ADL function  Description: INTERVENTIONS:  -  Assess patient's ability to carry out ADLs; assess patient's baseline for ADL function and identify physical deficits which impact ability to perform ADLs (bathing, care of mouth/teeth, toileting, grooming, dressing, etc )  - Assess/evaluate cause of self-care deficits   - Assess range of motion  - Assess patient's mobility; develop plan if impaired  - Assess patient's need for assistive devices and provide as appropriate  - Encourage maximum independence but intervene and supervise when necessary  - Involve family in performance of ADLs  - Assess for home care needs following discharge   - Consider OT consult to assist with ADL evaluation and planning for discharge  - Provide patient education as appropriate  Outcome: Progressing  Goal: Maintains/Returns to pre admission functional level  Description: INTERVENTIONS:  - Perform BMAT or MOVE assessment daily    - Set and communicate daily mobility goal to care team and patient/family/caregiver  - Collaborate with rehabilitation services on mobility goals if consulted  - Perform Range of Motion  times a day  - Reposition patient every hours    - Dangle patient  times a day  - Stand patient  times a day  - Ambulate patient  times a day  - Out of bed to chair  times a day   - Out of bed for meals  times a day  - Out of bed for toileting  - Record patient progress and toleration of activity level   Outcome: Progressing     Problem: Knowledge Deficit  Goal: Patient/family/caregiver demonstrates understanding of disease process, treatment plan, medications, and discharge instructions  Description: Complete learning assessment and assess knowledge base   Interventions:  - Provide teaching at level of understanding  - Provide teaching via preferred learning methods  Outcome: Progressing  Goal: Verbalizes understanding of labor plan  Description: Assess patient/family/caregiver's baseline knowledge level and ability to understand information  Provide education via patient/family/caregiver's preferred learning method at appropriate level of understanding  1  Provide teaching at level of understanding  2  Provide teaching via preferred learning method(s)    Outcome: Progressing     Problem: DISCHARGE PLANNING  Goal: Discharge to home or other facility with appropriate resources  Description: INTERVENTIONS:  - Identify barriers to discharge w/patient and caregiver  - Arrange for needed discharge resources and transportation as appropriate  - Identify discharge learning needs (meds, wound care, etc )  - Arrange for interpretive services to assist at discharge as needed  - Refer to Case Management Department for coordinating discharge planning if the patient needs post-hospital services based on physician/advanced practitioner order or complex needs related to functional status, cognitive ability, or social support system  Outcome: Progressing     Problem: BIRTH - VAGINAL/ SECTION  Goal: Fetal and maternal status remain reassuring during the birth process  Description: INTERVENTIONS:  - Monitor vital signs  - Monitor fetal heart rate  - Monitor uterine activity  - Monitor labor progression (vaginal delivery)  - DVT prophylaxis  - Antibiotic prophylaxis  Outcome: Progressing  Goal: Emotionally satisfying birthing experience for mother/fetus  Description: Interventions:  - Assess, plan, implement and evaluate the nursing care given to the patient in labor  - Advocate the philosophy that each childbirth experience is a unique experience and support the family's chosen level of involvement and control during the labor process   - Actively participate in both the patient's and family's teaching of the birth process  - Consider cultural, [place] and age-specific factors and plan care for the patient in labor  Outcome: Progressing     Problem: Labor & Delivery  Goal: Manages discomfort  Description: Assess and monitor for signs and symptoms of discomfort  Assess patient's pain level regularly and per hospital policy  Administer medications as ordered  Support use of nonpharmacological methods to help control pain such as distraction, imagery, relaxation, and application of heat and cold  Collaborate with interdisciplinary team and patient to determine appropriate pain management plan  1  Include patient in decisions related to comfort  2  Offer non-pharmacological pain management interventions  3  Report ineffective pain management to physician  Outcome: Progressing  Goal: Patient vital signs are stable  Description: 1  Assess vital signs - vaginal delivery    Outcome: Progressing

## 2022-12-28 NOTE — L&D DELIVERY NOTE
DELIVERY NOTE  Giovany Oakley 32 y o  female MRN: [id number]  Unit/Bed#: -01 Encounter: [id number]    Obstetrician:    Dr Jonny Melendez MD    Assistant:   Dr Cachorro Rivera    Pre-Delivery Diagnosis:   Patient Active Problem List   Diagnosis   • Anxiety state   • Attention deficit disorder (ADD) in adult   • Flexor carpi radialis tendinitis   • 38 weeks gestation of pregnancy     Post-Delivery Diagnosis:   Same as above - Delivered  Viable female fetus  2nd degree laceration    Procedure:  Spontaneous vaginal delivery  Repair 2nd degree and right labial spontaneous laceration      Findings:  1  Viable female  delivered on 22 at 80 with weight pending at time of dictation,  Apgar scores of 9 at one minute and 9 at five minutes  2  Spontaneous delivery of placenta with centrally  inserted 3-vessel cord  3  Clear amniotic fluid  4  2nd degree perineal laceration and right labial laceration, repaired with 3-0 Vicryl rapide    Specimens:   Cord blood obtained   Placenta; normal appearing, central insertion, intact   Arterial and venous blood gases (below)     Gases:  Umbilical Cord Venous Blood Gas:  Results from last 7 days   Lab Units 22  1340   PH COV  7 404   PCO2 COV mm HG 33 5   HCO3 COV mmol/L 20 5   BASE EXC COV mmol/L -3 4*   O2 CT CD VB mL/dL 12 1   O2 HGB, VENOUS CORD % 70 2     Umbilical Cord Arterial Blood Gas:  Results from last 7 days   Lab Units 22  1340   PH COA  7 251   PCO2 COA  54 1   PO2 COA mm HG 21 2   HCO3 COA mmol/L 23 2   BASE EXC COA mmol/L -4 7*   O2 CONTENT CORD ART ml/dl 9 4   O2 HGB, ARTERIAL CORD % 44 6       Quantitative Blood Loss:   275 mL           Complications:    None       Brief labor course:   Maykel Bauer was admitted at 39w0d due to concern for decreased fetal movement at term  Cervical ripening was performed with zavala balloon and pitocin  After zavala balloon expulsion, she was found to be 3cm   She progressed to 5cm and membranes were artificially ruptured for clear fluid  She was noted to have intermittent fetal tachycardia but was afebrile  Pitocin was decreased until fetal heart tones were appropriate  She progressed to complete and began pushing  Procedure Details      Description of procedure     After pushing for 37 minutes, on 22 at 1335 patient delivered a viable female , Apgars of 9 (1 min) and 9 (5 min)  The fetal vertex delivered spontaneously  There was no nuchal cord  With the assistance of maternal expulsive efforts and gentle downward traction of the fetal head, the anterior (left) shoulder was delivered without difficulty, followed by the remainder of the infant's body and contralateral arm  After delivery of the , delayed clamping of the umbilical cord was undertaken for 30 seconds  The  was noted to have good tone and cry spontaneously  There was no apparent injury to the   The cord was then doubly clamped and cut and the  was passed off to  staff for routine care  Umbilical cord blood and umbilical artery and venous gases were collected  Placenta was delivered with fundal massage and gentle traction on the cord with active management of the third stage of labor  Placenta delivered intact with a 3-vessel cord  Active management of the third stage of labor was undertaken with IV pitocin at 250 milliunits/min  A bimanual exam was performed and lower uterine segment was noted to be boggy  Pitocin was increased and bleeding was noted to be under control  Perineal Inspection/Laceration Repair    The vagina, cervix, perineum, and rectum were inspected and there was noted to be a 2nd degree laceration which was repaired with 3-0 vicryl rapide  Under adequate anesthesia, the apex of the vaginal laceration was identified and an anchoring suture was placed 1 cm above the apex    The vaginal mucosa and underlying rectovaginal fascia were closed using a running locked 3-0 Vicryl-CT 1 suture to the hymenal ring  The suture was then brought underneath the hymenal ring  The suture was then placed through the bulbocavernosus muscle  Continuing with the same suture, the transverse perineal muscles were reapproximated with 2 transverse running sutures  The suture was brought to the posterior apex of the skin laceration and then the skin was reapproximated in a subcuticular fashion to the hymenal ring  Right labial laceration was repaired with 2 interrupted sutures of 3-0 Vicryl rapide  Excellent hemostasis and cosmesis was achieved  Conclusion:  Mother and baby are currently recovering nicely in stable condition  Attending Supervision:   Dr Kelly Pena MD was present for the entire procedure  Sejal Garcia MD  OB/GYN  12/28/2022  2:20 PM

## 2022-12-28 NOTE — OB LABOR/OXYTOCIN SAFETY PROGRESS
Oxytocin Safety Progress Check Note - Mary Vargas 32 y o  female MRN: [id number]    Unit/Bed#: -01 Encounter: [id number]    Dose (izzy-units/min) Oxytocin: 10 izzy-units/min  Contraction Frequency (minutes): 1-7  Contraction Quality: Moderate  Tachysystole: No   Cervical Dilation: 6-7        Cervical Effacement: 90  Fetal Station: -1  Baseline Rate: 145 bpm  Fetal Heart Rate: 146 BPM  FHR Category: Category II               Vital Signs:   Vitals:    12/28/22 1027   BP: (!) 87/54   Pulse: 81   Resp:    Temp:    SpO2:        Notes/comments: SVE as above  FHT Cat I now with baseline 145bpm, moderate variability, 15x15 accels, no decels           Beckie Solano MD 12/28/2022 10:32 AM

## 2022-12-28 NOTE — OB LABOR/OXYTOCIN SAFETY PROGRESS
Oxytocin Safety Progress Check Note - Monika Arguello 32 y o  female MRN: [id number]    Unit/Bed#: -01 Encounter: [id number]    Dose (izzy-units/min) Oxytocin: 0 izzy-units/min (per dr Gutierrez Failing order)  Contraction Frequency (minutes): 6 mins  Contraction Quality: Mild  Tachysystole: No   Cervical Dilation: 6        Cervical Effacement: 80  Fetal Station: -2  Baseline Rate: 165 bpm  Fetal Heart Rate: 162 BPM  FHR Category: Category II               Vital Signs:   Vitals:    12/28/22 0827   BP: 93/50   Pulse: 98   Resp:    Temp:    SpO2:        Notes/comments: SVE deferred  FHT Cat II with baseline 160bpm, moderate variability, 15x15 accels, intermittent variable decels  Restart pitocin  Currently afebrile  Continue to monitor       D/w Dr González Jara MD 12/28/2022 8:38 AM

## 2022-12-28 NOTE — PLAN OF CARE
Problem: PAIN - ADULT  Goal: Verbalizes/displays adequate comfort level or baseline comfort level  Description: Interventions:  - Encourage patient to monitor pain and request assistance  - Assess pain using appropriate pain scale  - Administer analgesics based on type and severity of pain and evaluate response  - Implement non-pharmacological measures as appropriate and evaluate response  - Consider cultural and social influences on pain and pain management  - Notify physician/advanced practitioner if interventions unsuccessful or patient reports new pain  Outcome: Progressing     Problem: INFECTION - ADULT  Goal: Absence or prevention of progression during hospitalization  Description: INTERVENTIONS:  - Assess and monitor for signs and symptoms of infection  - Monitor lab/diagnostic results  - Monitor all insertion sites, i e  indwelling lines, tubes, and drains  - Monitor endotracheal if appropriate and nasal secretions for changes in amount and color  - [place] appropriate cooling/warming therapies per order  - Administer medications as ordered  - Instruct and encourage patient and family to use good hand hygiene technique  - Identify and instruct in appropriate isolation precautions for identified infection/condition  Outcome: Progressing  Goal: Absence of fever/infection during neutropenic period  Description: INTERVENTIONS:  - Monitor WBC    Outcome: Progressing     Problem: SAFETY ADULT  Goal: Patient will remain free of falls  Description: INTERVENTIONS:  - Educate patient/family on patient safety including physical limitations  - Instruct patient to call for assistance with activity   - Consult OT/PT to assist with strengthening/mobility   - Keep Call bell within reach  - Keep bed low and locked with side rails adjusted as appropriate  - Keep care items and personal belongings within reach  - Initiate and maintain comfort rounds  - Make Fall Risk Sign visible to staff  - Offer Toileting every  Hours, in advance of need  - Initiate/Maintain alarm  - Obtain necessary fall risk management equipment:   - Apply yellow socks and bracelet for high fall risk patients  - Consider moving patient to room near nurses station  Outcome: Progressing  Goal: Maintain or return to baseline ADL function  Description: INTERVENTIONS:  -  Assess patient's ability to carry out ADLs; assess patient's baseline for ADL function and identify physical deficits which impact ability to perform ADLs (bathing, care of mouth/teeth, toileting, grooming, dressing, etc )  - Assess/evaluate cause of self-care deficits   - Assess range of motion  - Assess patient's mobility; develop plan if impaired  - Assess patient's need for assistive devices and provide as appropriate  - Encourage maximum independence but intervene and supervise when necessary  - Involve family in performance of ADLs  - Assess for home care needs following discharge   - Consider OT consult to assist with ADL evaluation and planning for discharge  - Provide patient education as appropriate  Outcome: Progressing  Goal: Maintains/Returns to pre admission functional level  Description: INTERVENTIONS:  - Perform BMAT or MOVE assessment daily    - Set and communicate daily mobility goal to care team and patient/family/caregiver  - Collaborate with rehabilitation services on mobility goals if consulted  - Perform Range of Motion  times a day  - Reposition patient every  hours    - Dangle patient  times a day  - Stand patient  times a day  - Ambulate patient  times a day  - Out of bed to chair  times a day   - Out of bed for meals  times a day  - Out of bed for toileting  - Record patient progress and toleration of activity level   Outcome: Progressing     Problem: Knowledge Deficit  Goal: Patient/family/caregiver demonstrates understanding of disease process, treatment plan, medications, and discharge instructions  Description: Complete learning assessment and assess knowledge base   Interventions:  - Provide teaching at level of understanding  - Provide teaching via preferred learning methods  Outcome: Progressing  Goal: Verbalizes understanding of labor plan  Description: Assess patient/family/caregiver's baseline knowledge level and ability to understand information  Provide education via patient/family/caregiver's preferred learning method at appropriate level of understanding  1  Provide teaching at level of understanding  2  Provide teaching via preferred learning method(s)    Outcome: Progressing     Problem: DISCHARGE PLANNING  Goal: Discharge to home or other facility with appropriate resources  Description: INTERVENTIONS:  - Identify barriers to discharge w/patient and caregiver  - Arrange for needed discharge resources and transportation as appropriate  - Identify discharge learning needs (meds, wound care, etc )  - Arrange for interpretive services to assist at discharge as needed  - Refer to Case Management Department for coordinating discharge planning if the patient needs post-hospital services based on physician/advanced practitioner order or complex needs related to functional status, cognitive ability, or social support system  Outcome: Progressing     Problem: BIRTH - VAGINAL/ SECTION  Goal: Fetal and maternal status remain reassuring during the birth process  Description: INTERVENTIONS:  - Monitor vital signs  - Monitor fetal heart rate  - Monitor uterine activity  - Monitor labor progression (vaginal delivery)  - DVT prophylaxis  - Antibiotic prophylaxis  Outcome: Progressing  Goal: Emotionally satisfying birthing experience for mother/fetus  Description: Interventions:  - Assess, plan, implement and evaluate the nursing care given to the patient in labor  - Advocate the philosophy that each childbirth experience is a unique experience and support the family's chosen level of involvement and control during the labor process   - Actively participate in both the patient's and family's teaching of the birth process  - Consider cultural, [place] and age-specific factors and plan care for the patient in labor  Outcome: Progressing     Problem: Labor & Delivery  Goal: Manages discomfort  Description: Assess and monitor for signs and symptoms of discomfort  Assess patient's pain level regularly and per hospital policy  Administer medications as ordered  Support use of nonpharmacological methods to help control pain such as distraction, imagery, relaxation, and application of heat and cold  Collaborate with interdisciplinary team and patient to determine appropriate pain management plan  1  Include patient in decisions related to comfort  2  Offer non-pharmacological pain management interventions  3  Report ineffective pain management to physician  Outcome: Progressing  Goal: Patient vital signs are stable  Description: 1  Assess vital signs - vaginal delivery    Outcome: Progressing

## 2022-12-28 NOTE — OB LABOR/OXYTOCIN SAFETY PROGRESS
Oxytocin Safety Progress Check Note - Rd Blackmon 32 y o  female MRN: [id number]    Unit/Bed#: -01 Encounter: [id number]    Dose (izzy-units/min) Oxytocin: 16 izzy-units/min  Contraction Frequency (minutes): 2-5  Contraction Quality: Moderate  Tachysystole: No   Cervical Dilation: 10  Dilation Complete Date: 12/28/22  Dilation Complete Time: 1238  Cervical Effacement: 100  Fetal Station: 0  Baseline Rate: 150 bpm  Fetal Heart Rate: 150 BPM  FHR Category: Category I               Vital Signs:   Vitals:    12/28/22 1227   BP: 101/56   Pulse: (!) 111   Resp:    Temp:    SpO2:        Notes/comments: Patient complete and 0 station  FHT Cat I  Plan to start pushing shortly  D/w Dr Katy Castillo MD 12/28/2022 12:40 PM

## 2022-12-28 NOTE — LACTATION NOTE
This note was copied from a baby's chart  CONSULT - LACTATION  Baby Girl (Carl Monterroso) Roel 0 days female MRN: [id number]    [address] Room / Bed: (N)/(N) Encounter: [id number]    Maternal Information     MOTHER:  Edelmira Sevilla  Maternal Age: 32 y o    OB History: # 1 - Date: 12/25/14, Sex: None, Weight: None, GA: None, Delivery: None, Apgar1: None, Apgar5: None, Living: None, Birth Comments: None    # 2 - Date: 03/25/15, Sex: None, Weight: None, GA: None, Delivery: None, Apgar1: None, Apgar5: None, Living: None, Birth Comments: None    # 3 - Date: 01/2022, Sex: None, Weight: None, GA: None, Delivery: Ectopic, Apgar1: None, Apgar5: None, Living: None, Birth Comments: on Mirena IUD, given methotrexate    # 4 - Date: 12/28/22, Sex: Female, Weight: 3765 g (8 lb 4 8 oz), GA: 39w1d, Delivery: Vaginal, Spontaneous, Apgar1: 9, Apgar5: 9, Living: Living, Birth Comments: None   Previouse breast reduction surgery? No    Lactation history:   Has patient previously breast fed: No   How long had patient previously breast fed:     Previous breast feeding complications:       Past Surgical History:   Procedure Laterality Date   • COLONOSCOPY     • EXAMINATION UNDER ANESTHESIA N/A 11/6/2018    Procedure: EXAM UNDER ANESTHESIA (EUA)  WITH IUD REMOVAL;  Surgeon: Baldemar Chen DO;  Location: [facility];  Service: Gynecology   • IN OPEN TX PHALANGEAL SHAFT FRACTURE PROX/MIDDLE EA Right 12/19/2018    Procedure: CLOSED REDUCTION PERCUTANEOUS PINNING P2 RIGHT RING FINGER;  Surgeon: Amaya Logan DO;  Location: [address] [place] [address];  Service: Orthopedics   • REMOVAL OF INTRAUTERINE DEVICE (IUD) N/A 11/6/2018    Procedure: REMOVAL OF INTRAUTERINE DEVICE (IUD);   Surgeon: Baldemar Chen DO;  Location: [facility];  Service: Gynecology   • RHINOPLASTY  2013   • WISDOM TOOTH EXTRACTION          Birth information:  YOB: 2022   Time of birth: 1:35 PM   Sex: female   Delivery type: Vaginal, Spontaneous   Birth Weight: 3765 g (8 lb 4 8 oz)   Percent of Weight Change: 0%     Gestational Age: 36w3d   [unfilled]      Feeding recommendations:  breast feed on demand       Met with mother  Provided mother with Ready, Set, Baby booklet  Discussed Skin to Skin contact an benefits to mom and baby  Talked about the delay of the first bath until baby has adjusted  Spoke about the benefits of rooming in  Feeding on cue and what that means for recognizing infant's hunger  Avoidance of pacifiers for the first month discussed  Talked about exclusive breastfeeding for the first 6 months  Positioning and latch reviewed as well as showing images of other feeding positions  Discussed the properties of a good latch in any position  Reviewed hand/manual expression  Discussed s/s that baby is getting enough milk and some s/s that breastfeeding dyad may need further help  Gave information on common concerns, what to expect the first few weeks after delivery, preparing for other caregivers, and how partners can help  Resources for support also provided  Information on hand expression given  Discussed benefits of knowing how to manually express breast including stimulating milk supply, softening nipple for latch and evacuating breast in the event of engorgement  Discussed 2nd night syndrome and ways to calm infant  Hand out given  Provided DC booklet at this time, enc family to review and prepare questions for day of DC  Assisted parents to place baby skin to skin in football and cross cradle holds  Discussed importance of alignment of baby's ear, shoulder, and hip in any preferred position  Worked on supporting baby at breast level and beginning the feed with baby's nose arriving at the nipple  Then, using areolar compression while guiding baby chin-forward to the breast to achieve a deep, comfortable latch  Baby is very sleepy, spitting up fluid throughout attempt   Enc Mom to keep baby skin to skin, watch for cues and feed on demand  Reviewed signs of effective breastfeeding: audible swallows, strong but comfortable tugging while latched, breasts softening (after milk comes in), baby falling asleep and releasing the breast, and meeting daily diaper goals  Mom has a breast pump at home       Lester Olivier RN 12/28/2022 6:39 PM

## 2022-12-28 NOTE — OB LABOR/OXYTOCIN SAFETY PROGRESS
Oxytocin Safety Progress Check Note - Anthony Childs 32 y o  female MRN: [id number]    Unit/Bed#: -01 Encounter: [id number]    Dose (izzy-units/min) Oxytocin: 2 izzy-units/min (Verbal orders from Dr Rocio Locke due to [facility])  Contraction Frequency (minutes): 3-6 5  Contraction Quality: Mild  Tachysystole: No     Cervical Dilation: 5  Cervical Effacement: 70  Fetal Station: -3     Baseline Rate: 155 bpm  Fetal Heart Rate: 155 BPM  FHR Category: Category I    FHT returned to baseline, fetal tachycardia resolved  AROM at this time for clear fluid; plan to go up on pit per protocol      Vital Signs:   Vitals:    12/27/22 2352   BP:    Pulse: 83   Resp:    Temp:    SpO2: 100%     D/w Dr Sandrine Barrientos MD 12/28/2022 12:00 AM

## 2022-12-28 NOTE — OB LABOR/OXYTOCIN SAFETY PROGRESS
Oxytocin Safety Progress Check Note - Caridad Bradshaw 32 y o  female MRN: [id number]    Unit/Bed#: -01 Encounter: [id number]    Dose (izzy-units/min) Oxytocin: 12 izzy-units/min  Contraction Frequency (minutes): 2-2 5  Contraction Quality: Mild  Tachysystole: No   Cervical Dilation: 5        Cervical Effacement: 70  Fetal Station: -3  Baseline Rate: 160 bpm  Fetal Heart Rate: 181 BPM  FHR Category: Category II               Vital Signs:   Vitals:    12/28/22 0331   BP:    Pulse:    Resp:    Temp: 99 1 °F (37 3 °C)   SpO2:        Notes/comments:   SVE as above  More comfortable with her epidural  FHT  With baseline 155 bpm, moderate variability, accelerations present, no declerations at this time  Continue with Pitocin titration at this time     D/w Dr Elise Escobar DO 12/28/2022 3:41 AM

## 2022-12-28 NOTE — OB LABOR/OXYTOCIN SAFETY PROGRESS
Oxytocin Safety Progress Check Note - Bess Mayo 32 y o  female MRN: [id number]    Unit/Bed#: -01 Encounter: [id number]    Dose (izzy-units/min) Oxytocin: 10 izzy-units/min  Contraction Frequency (minutes): 2 5-3 5  Contraction Quality: Mild  Tachysystole: No   Cervical Dilation: 3        Cervical Effacement: 50  Fetal Station: -3  Baseline Rate: 130 bpm  Fetal Heart Rate: 137 BPM  FHR Category: Category I               Vital Signs:   Vitals:    12/27/22 1930   BP: 98/55   Pulse:    Resp:    Temp:    SpO2:        Notes/comments:   SVE as above     FHT Cat 1  Plan to continue with Pitocin titration  Discussed with Dr Mare Smith DO 12/27/2022 7:44 PM

## 2022-12-29 LAB
ATRIAL RATE: 79 BPM
P AXIS: 59 DEGREES
PR INTERVAL: 124 MS
QRS AXIS: 68 DEGREES
QRSD INTERVAL: 88 MS
QT INTERVAL: 390 MS
QTC INTERVAL: 447 MS
T WAVE AXIS: 5 DEGREES
VENTRICULAR RATE: 79 BPM

## 2022-12-29 RX ORDER — IBUPROFEN 600 MG/1
600 TABLET ORAL EVERY 6 HOURS PRN
Qty: 30 TABLET | Refills: 0
Start: 2022-12-29

## 2022-12-29 RX ORDER — FAMOTIDINE 20 MG/1
20 TABLET, FILM COATED ORAL 2 TIMES DAILY
Status: DISCONTINUED | OUTPATIENT
Start: 2022-12-29 | End: 2022-12-30 | Stop reason: HOSPADM

## 2022-12-29 RX ORDER — SENNOSIDES 8.6 MG
8.6 TABLET ORAL 2 TIMES DAILY PRN
Qty: 30 TABLET | Refills: 1 | Status: SHIPPED | OUTPATIENT
Start: 2022-12-29

## 2022-12-29 RX ORDER — SIMETHICONE 80 MG
80 TABLET,CHEWABLE ORAL EVERY 6 HOURS PRN
Status: DISCONTINUED | OUTPATIENT
Start: 2022-12-29 | End: 2022-12-30 | Stop reason: HOSPADM

## 2022-12-29 RX ORDER — DOCUSATE SODIUM 100 MG/1
100 CAPSULE, LIQUID FILLED ORAL 2 TIMES DAILY PRN
Qty: 30 CAPSULE | Refills: 1 | Status: SHIPPED | OUTPATIENT
Start: 2022-12-29

## 2022-12-29 RX ORDER — ACETAMINOPHEN 325 MG/1
650 TABLET ORAL EVERY 4 HOURS PRN
Refills: 0
Start: 2022-12-29

## 2022-12-29 RX ADMIN — ACETAMINOPHEN 650 MG: 325 TABLET, FILM COATED ORAL at 17:39

## 2022-12-29 RX ADMIN — ACETAMINOPHEN 650 MG: 325 TABLET, FILM COATED ORAL at 10:06

## 2022-12-29 RX ADMIN — IBUPROFEN 600 MG: 600 TABLET, FILM COATED ORAL at 05:32

## 2022-12-29 RX ADMIN — DOCUSATE SODIUM 100 MG: 100 CAPSULE, LIQUID FILLED ORAL at 17:39

## 2022-12-29 RX ADMIN — FAMOTIDINE 20 MG: 20 TABLET, FILM COATED ORAL at 17:39

## 2022-12-29 RX ADMIN — IBUPROFEN 600 MG: 600 TABLET, FILM COATED ORAL at 23:00

## 2022-12-29 RX ADMIN — FAMOTIDINE 20 MG: 20 TABLET, FILM COATED ORAL at 10:06

## 2022-12-29 RX ADMIN — SIMETHICONE 80 MG: 80 TABLET, CHEWABLE ORAL at 03:28

## 2022-12-29 RX ADMIN — IBUPROFEN 600 MG: 600 TABLET, FILM COATED ORAL at 13:27

## 2022-12-29 NOTE — PROGRESS NOTES
Evaluated patient at bedside following nursing communication the patient was having discomfort around her diaphragm  Patient describes the pain as now being located in her sternum and making it difficult to take a deep breath  Lungs are clear to auscultation bilaterally  EKG, Pepcid,Mylicon ordered  Discussed with Dr Mckinley Sotelo MD  OBGYN PGY-1  12/29/2022 2:59 AM

## 2022-12-29 NOTE — ASSESSMENT & PLAN NOTE
Lochia WNL   Recovering well   Appropriate bowel and bladder function   Pain well controlled   Tolerating diet   Breastfeeding   Ambulating without issues   No lower extremity tenderness  GBS negative   Rh positive

## 2022-12-29 NOTE — UTILIZATION REVIEW
NOTIFICATION OF INPATIENT ADMISSION   MATERNITY/DELIVERY AUTHORIZATION REQUEST   SERVICING FACILITY:   Pending sale to [facility] - L&D, [place], NICU  [address]  Tax ID: [id number]  NPI: [id number]   ATTENDING PROVIDER:  Attending Name and NPI#: Oren Bay Md [[id number]]  Address: [address] [place] [address]  Phone: [phone]   ADMISSION INFORMATION:  Place of Service: Inpatient [address] [facility]  [address]  Place of Service Code: 21  Inpatient Admission Date/Time: 22 11:32 AM  Discharge Date/Time: No discharge date for patient encounter  Admitting Diagnosis Code/Description:  Decreased fetal movement [O36 8190]  39 weeks gestation of pregnancy [Z3A 39]  Encounter for full-term uncomplicated delivery [H42]     Mother: Dolores Gordillo 1995 Estimated Date of Delivery: 1/3/23  Delivering clinician: Oren Bay    OB History        4    Para   1    Term   1       0    AB   3    Living   1       SAB   2    IAB   0    Ectopic   1    Multiple   0    Live Births   1               [place] Name & MRN:   Information for the patient's :  David Cowman Girl Gennette Bullion) [[id number]]     [place] Delivery Information:  Sex: female  Delivered 2022 1:35 PM by Vaginal, Spontaneous; Gestational Age: 36w3d    [place] Measurements:  Weight: 8 lb 4 8 oz (3765 g); Height: 19"    APGAR 1 minute 5 minutes 10 minutes   Totals: 9 9       Birth Information: 32 y o  female MRN: [id number] Unit/Bed#: -01   Birthweight: No birth weight on file  Gestational Age: <None> Delivery Type:    APGARS Totals:        UTILIZATION REVIEW CONTACT:  [facility] Utilization   Network Utilization Review Department  Phone: [phone]  Fax [phone]  Email: [email or web address]  Contact for approvals/pending authorizations, clinical reviews, and discharge       PHYSICIAN ADVISORY SERVICES:  Medical Necessity Denial & [employer] Review  Phone: [phone]  Fax: [phone]  Email: [email or web address]

## 2022-12-29 NOTE — PROGRESS NOTES
Progress Note - OB/GYN  Robb Zhang 32 y o  female MRN: [id number]  Unit/Bed#: -01 Encounter: [id number]    Assessment and Plan     Robb Zhang is a patient of: [facility]   She is PPD# 1 s/p  spontaneous vaginal delivery  Recovering well and is stable       Attention deficit disorder (ADD) in adult  Assessment & Plan  Not on medication    *  (spontaneous vaginal delivery)  Assessment & Plan  Lochia WNL   Recovering well   Appropriate bowel and bladder function   Pain well controlled   Tolerating diet   Breastfeeding   Ambulating without issues   No lower extremity tenderness  GBS negative   Rh positive           Disposition    - Anticipate discharge home on PPD# 1-3      Subjective/Objective     Chief Complaint: Postpartum State     Subjective:    Robb Zhang is PPD#1 s/p  spontaneous vaginal delivery  She has no current complaints  Pain is well controlled  Patient is currently voiding  She is ambulating  Patient is currently passing flatus and has had no bowel movement  She is tolerating PO, and denies nausea or vomitting  Patient denies fever, chills, chest pain, shortness of breath, or calf tenderness  Lochia is normal  She is  Breastfeeding  She is recovering well and is stable  Vitals:   /60 (BP Location: Left arm)   Pulse 83   Temp 98 5 °F (36 9 °C) (Oral)   Resp 18   Ht 5' 5" (1 651 m)   Wt 83 5 kg (184 lb)   LMP 2022   SpO2 98%   Breastfeeding Yes   BMI 30 62 kg/m²       Intake/Output Summary (Last 24 hours) at 2022 0611  Last data filed at 2022 1817  Gross per 24 hour   Intake 1406 6 ml   Output 4175 ml   Net -2768 4 ml       Invasive Devices     Peripheral Intravenous Line  Duration           Peripheral IV 22 Distal;Dorsal (posterior); Left Forearm 1 day                Physical Exam:   GEN: Robb Zhang appears well, alert and oriented x 3, pleasant and cooperative   CARDIO: RRR, no murmurs or rubs  RESP:  CTAB, no wheezes or rales  ABDOMEN: soft, no tenderness, no distention, fundus @ -1  EXTREMITIES: SCDs on, non tender, no erythema, b/l Nilda's sign negative      Labs:     Hemoglobin   Date Value Ref Range Status   12/27/2022 11 5 11 5 - 15 4 g/dL Final   10/31/2022 10 9 (L) 11 5 - 15 4 g/dL Final     WBC   Date Value Ref Range Status   12/27/2022 10 10 4 31 - 10 16 Thousand/uL Final   10/31/2022 9 96 4 31 - 10 16 Thousand/uL Final     Platelets   Date Value Ref Range Status   12/27/2022 327 149 - 390 Thousands/uL Final   10/31/2022 350 149 - 390 Thousands/uL Final     Creatinine   Date Value Ref Range Status   05/19/2022 0 64 0 60 - 1 30 mg/dL Final     Comment:     Standardized to IDMS reference method   11/05/2018 0 92 0 60 - 1 30 mg/dL Final     Comment:     Standardized to IDMS reference method     AST   Date Value Ref Range Status   05/19/2022 13 13 - 39 U/L Final     Comment:     Specimen collection should occur prior to Sulfasalazine administration due to the potential for falsely depressed results  11/14/2016 18 5 - 45 U/L Final     ALT   Date Value Ref Range Status   05/19/2022 5 (L) 7 - 52 U/L Final     Comment:     Specimen collection should occur prior to Sulfasalazine administration due to the potential for falsely depressed results      11/14/2016 19 12 - 78 U/L Final          Margaret Rivas MD  12/29/2022  6:11 AM

## 2022-12-29 NOTE — LACTATION NOTE
This note was copied from a baby's chart  Follow up Lactation: mom has baby latched to the left breast  Active, coordinated sucking bursts  Sometimes baby loses the latch  Re-latches well with staff help on the L breast   Active coordinated sucking  Baby continues to have 5-10 sucking burst, then loses the breast      Upon oral assessment, high, bubble shaped palate, mild ridging  Education on paced bottle feeding via mom's request  Paced bottle fed baby with demonstration  No teach -back  Education on how and when to introduce a bottle  Mom states she is going back to work at 7 weeks  Encouraged mom to wait until 4 week growth spurt for bottle feeding  Demonstration and teach-back of bottle feeding  Enc  Mom to share milk mob you tube video with FOB and maternal grandmother    Reviewed feeding at d/c and how to establish milk supply  Enc  To call lactation    Milk Supply:   - Allow for non-nutritive suck at the breast to stimulate supply   - Allow for skin to skin during and after each breastfeeding session   - Use massage, heat, and hand expression prior to feedings to assist with deep latch   - Increase pumping sessions and pump after every feeding    Feed expressed milk or formula as needed/desired  Paced bottle feeding technique is less stressful for your baby, prevents overfeeding and protects the breastfeeding relationship  You can find a video about paced bottle feeding at www lacted  org      Education on positioning and alignment   Mom is encouraged to:     - Bring baby up to the breast (use of pillows to elevate so baby's torso is against mom's breasts)   - Skin to skin for feedings with top hand exposed to show signs of satiation   - Chin deep into breast tissue (make baby look up to the nipple)   - nose aligned to the nipple   -Wait for wide gape, drag chin on the breast so nipple is aimed at the upper, back palate  - Cheek should be touching breast   - Deep, firm hold of baby with ear, shoulder, hip alignment    Education provided on growth spurts and cluster feedings  There are 4 growth spurts before 12 weeks  Cluster feedings may occur a few days before the growth spurt begins  Small, frequent feeds at the breast are encouraged    Demonstrated with teach back breast compressions during a feeding to increase milk transfer and stimulate suckling after a breathing/muscle break

## 2022-12-29 NOTE — LACTATION NOTE
This note was copied from a baby's chart  Follow up Lactation: mom is very anxious, unsure about her ability to breastfeed  Mom states FOB wants to bottle feed as well  Education on positioning and latch  Education wide mouth, deep latch  Education on timing of feeds, signs of satiation  Ed  On how to establish milk supply  Mom has S9    Switch feeds reviewed    Education alt  Feeding  Baby took 7 mls on the breast and 5 mls off the breast via syringe  Ed  On discharge feeding plan  Spectra  Education on turning on the pump, press the 3 wavy lines to place pump on stimulation mode (high cycle, low vacuum) Set vacuum to comfort with light suction  After 3 min, press 3 wavy lines and change setting to Expression mode (low Cycle, High vacuum) Vacuum setting should not pinch, only tug the nipple  Now pump is set  Next time mom pumps, will only need to turn on pump and press 3 wavy line button to change cycle three times in a pumping session      - Start feedings on breast that last feeding ended   - allow no more than 3 hours between breast feeding sessions   - time between feedings is counted from the beginning of the first feed to the beginning of the next feeding session    Reviewed early signs of hunger, including tensing of hands and shoulders - no need to wait for open eyes  Crying is a late hunger sign  If baby is crying, soothe baby first and then attempt to latch  Reviewed normal sucking patterns: transition from stimulation to nutritive to release or non-nutritive  The goal is to see and hear lots of swallowing  Reviewed normal nursing pattern: infant could latch on one breast up to 30 minutes or until releases on own  Signs of satiation is open hand with fingers that do not grab your finger  Discussed difference in sensation of non-nutritive v nutritive sucking    Mom is encouraged to place baby skin to skin for feedings   Skin to skin education provided for baby placement on mother's chest, baby only in diaper, blankets below shoulders on baby's back  Skin to skin is encouraged to continue at home for feedings and between feedings  Switch Feeds:   Start every feeding at the breast  Offer both breasts or one breast and use breast compressions to achieve active suckling  Once baby is not actively suckling, bring baby in upright position and offer expressed milk and/or artificial supplementation via alternative feeding method (syringe,  paced bottle feeding)  Burp frequently between breasts and during paced bottle feeding  Once feed is complete, place baby back on breast for on-nutritive suck  Pump after the feeding session to supplement with expressed milk at next feed  Discharge feeding plan    1  Meet early feeding cues  2  Use massage, warmth, hand expression to stimulate breasts  3  Bring baby to breast skin to skin  4  Align nipple to nose, drag nipple to chin, (move baby not breast) and bring baby to breast when mouth is wide and deep latch is achieved  U shape hold to the breast  Cheek touching the breast, no twisting of baby's neck  5  Use breast compressions to stimulate suck  6  Once baby does not suck with stimulation, becomes fussy, or un-latches feed expressed milk via alternative feeding method (syringe, paced bottle)  7  Bring baby back to breast for non-nutritive suck and skin to skin  8  Pump after each feed to stimulate breasts and have expressed milk for next feed    Provided education on growth spurts, when to introduce bottles; paced bottle feeding, and non-nutritive suck at the breast  Provided education on Signs of satiation  Encouraged to call lactation to observe a latch prior to discharge for reassurance  Encouraged to call baby and me with any questions and closely monitor output  Met with mother to go over discharge breastfeeding booklet including the feeding log   Emphasized 8 or more (12) feedings in a 24 hour period, what to expect for the number of diapers per day of life and the progression of properties of the  stooling pattern  Reviewed breastfeeding and your lifestyle, storage and preparation of breast milk, how to keep you breast pump clean, the employed breastfeeding mother and paced bottle feeding handouts  Booklet included Breastfeeding Resources for after discharge including access to the number for the [address]

## 2022-12-29 NOTE — PLAN OF CARE
Problem: PAIN - ADULT  Goal: Verbalizes/displays adequate comfort level or baseline comfort level  Description: Interventions:  - Encourage patient to monitor pain and request assistance  - Assess pain using appropriate pain scale  - Administer analgesics based on type and severity of pain and evaluate response  - Implement non-pharmacological measures as appropriate and evaluate response  - Consider cultural and social influences on pain and pain management  - Notify physician/advanced practitioner if interventions unsuccessful or patient reports new pain  Outcome: Progressing     Problem: INFECTION - ADULT  Goal: Absence or prevention of progression during hospitalization  Description: INTERVENTIONS:  - Assess and monitor for signs and symptoms of infection  - Monitor lab/diagnostic results  - Monitor all insertion sites, i e  indwelling lines, tubes, and drains  - Monitor endotracheal if appropriate and nasal secretions for changes in amount and color  - [place] appropriate cooling/warming therapies per order  - Administer medications as ordered  - Instruct and encourage patient and family to use good hand hygiene technique  - Identify and instruct in appropriate isolation precautions for identified infection/condition  Outcome: Progressing  Goal: Absence of fever/infection during neutropenic period  Description: INTERVENTIONS:  - Monitor WBC    Outcome: Progressing     Problem: SAFETY ADULT  Goal: Patient will remain free of falls  Description: INTERVENTIONS:  - Educate patient/family on patient safety including physical limitations  - Instruct patient to call for assistance with activity   - Consult OT/PT to assist with strengthening/mobility   - Keep Call bell within reach  - Keep bed low and locked with side rails adjusted as appropriate  - Keep care items and personal belongings within reach  - Initiate and maintain comfort rounds  - Make Fall Risk Sign visible to staff  - Apply yellow socks and bracelet for high fall risk patients  - Consider moving patient to room near nurses station  Outcome: Progressing  Goal: Maintain or return to baseline ADL function  Description: INTERVENTIONS:  -  Assess patient's ability to carry out ADLs; assess patient's baseline for ADL function and identify physical deficits which impact ability to perform ADLs (bathing, care of mouth/teeth, toileting, grooming, dressing, etc )  - Assess/evaluate cause of self-care deficits   - Assess range of motion  - Assess patient's mobility; develop plan if impaired  - Assess patient's need for assistive devices and provide as appropriate  - Encourage maximum independence but intervene and supervise when necessary  - Involve family in performance of ADLs  - Assess for home care needs following discharge   - Consider OT consult to assist with ADL evaluation and planning for discharge  - Provide patient education as appropriate  Outcome: Progressing  Goal: Maintains/Returns to pre admission functional level  Description: INTERVENTIONS:  - Perform BMAT or MOVE assessment daily    - Set and communicate daily mobility goal to care team and patient/family/caregiver     - Collaborate with rehabilitation services on mobility goals if consulted  - Out of bed for toileting  - Record patient progress and toleration of activity level   Outcome: Progressing     Problem: DISCHARGE PLANNING  Goal: Discharge to home or other facility with appropriate resources  Description: INTERVENTIONS:  - Identify barriers to discharge w/patient and caregiver  - Arrange for needed discharge resources and transportation as appropriate  - Identify discharge learning needs (meds, wound care, etc )  - Arrange for interpretive services to assist at discharge as needed  - Refer to Case Management Department for coordinating discharge planning if the patient needs post-hospital services based on physician/advanced practitioner order or complex needs related to functional status, cognitive ability, or social support system  Outcome: Progressing     Problem: POSTPARTUM  Goal: Experiences normal postpartum course  Description: INTERVENTIONS:  - Monitor maternal vital signs  - Assess uterine involution and lochia  Outcome: Progressing  Goal: Appropriate maternal -  bonding  Description: INTERVENTIONS:  - Identify family support  - Assess for appropriate maternal/infant bonding   -Encourage maternal/infant bonding opportunities  - Referral to  or  as needed  Outcome: Progressing  Goal: Establishment of infant feeding pattern  Description: INTERVENTIONS:  - Assess breast/bottle feeding  - Refer to lactation as needed  Outcome: Progressing  Goal: Incision(s), wounds(s) or drain site(s) healing without S/S of infection  Description: INTERVENTIONS  - Assess and document dressing, incision, wound bed, drain sites and surrounding tissue  - Provide patient and family education    Outcome: Progressing

## 2022-12-29 NOTE — PLAN OF CARE
Problem: PAIN - ADULT  Goal: Verbalizes/displays adequate comfort level or baseline comfort level  Description: Interventions:  - Encourage patient to monitor pain and request assistance  - Assess pain using appropriate pain scale  - Administer analgesics based on type and severity of pain and evaluate response  - Implement non-pharmacological measures as appropriate and evaluate response  - Consider cultural and social influences on pain and pain management  - Notify physician/advanced practitioner if interventions unsuccessful or patient reports new pain  Outcome: Progressing     Problem: INFECTION - ADULT  Goal: Absence or prevention of progression during hospitalization  Description: INTERVENTIONS:  - Assess and monitor for signs and symptoms of infection  - Monitor lab/diagnostic results  - Monitor all insertion sites, i e  indwelling lines, tubes, and drains  - Monitor endotracheal if appropriate and nasal secretions for changes in amount and color  - [place] appropriate cooling/warming therapies per order  - Administer medications as ordered  - Instruct and encourage patient and family to use good hand hygiene technique  - Identify and instruct in appropriate isolation precautions for identified infection/condition  Outcome: Progressing  Goal: Absence of fever/infection during neutropenic period  Description: INTERVENTIONS:  - Monitor WBC    Outcome: Progressing     Problem: SAFETY ADULT  Goal: Patient will remain free of falls  Description: INTERVENTIONS:  - Educate patient/family on patient safety including physical limitations  - Instruct patient to call for assistance with activity   - Consult OT/PT to assist with strengthening/mobility   - Keep Call bell within reach  - Keep bed low and locked with side rails adjusted as appropriate  - Keep care items and personal belongings within reach  - Initiate and maintain comfort rounds  - Make Fall Risk Sign visible to staff  - Apply yellow socks and bracelet for high fall risk patients  - Consider moving patient to room near nurses station  Outcome: Progressing  Goal: Maintain or return to baseline ADL function  Description: INTERVENTIONS:  -  Assess patient's ability to carry out ADLs; assess patient's baseline for ADL function and identify physical deficits which impact ability to perform ADLs (bathing, care of mouth/teeth, toileting, grooming, dressing, etc )  - Assess/evaluate cause of self-care deficits   - Assess range of motion  - Assess patient's mobility; develop plan if impaired  - Assess patient's need for assistive devices and provide as appropriate  - Encourage maximum independence but intervene and supervise when necessary  - Involve family in performance of ADLs  - Assess for home care needs following discharge   - Consider OT consult to assist with ADL evaluation and planning for discharge  - Provide patient education as appropriate  Outcome: Progressing  Goal: Maintains/Returns to pre admission functional level  Description: INTERVENTIONS:  - Perform BMAT or MOVE assessment daily    - Set and communicate daily mobility goal to care team and patient/family/caregiver     - Collaborate with rehabilitation services on mobility goals if consulted  - Out of bed for toileting  - Record patient progress and toleration of activity level   Outcome: Progressing     Problem: DISCHARGE PLANNING  Goal: Discharge to home or other facility with appropriate resources  Description: INTERVENTIONS:  - Identify barriers to discharge w/patient and caregiver  - Arrange for needed discharge resources and transportation as appropriate  - Identify discharge learning needs (meds, wound care, etc )  - Arrange for interpretive services to assist at discharge as needed  - Refer to Case Management Department for coordinating discharge planning if the patient needs post-hospital services based on physician/advanced practitioner order or complex needs related to functional status, cognitive ability, or social support system  Outcome: Progressing     Problem: POSTPARTUM  Goal: Experiences normal postpartum course  Description: INTERVENTIONS:  - Monitor maternal vital signs  - Assess uterine involution and lochia  Outcome: Progressing  Goal: Appropriate maternal -  bonding  Description: INTERVENTIONS:  - Identify family support  - Assess for appropriate maternal/infant bonding   -Encourage maternal/infant bonding opportunities  - Referral to  or  as needed  Outcome: Progressing  Goal: Establishment of infant feeding pattern  Description: INTERVENTIONS:  - Assess breast/bottle feeding  - Refer to lactation as needed  Outcome: Progressing  Goal: Incision(s), wounds(s) or drain site(s) healing without S/S of infection  Description: INTERVENTIONS  - Assess and document dressing, incision, wound bed, drain sites and surrounding tissue  - Provide patient and family education  Outcome: Progressing

## 2022-12-30 VITALS
HEART RATE: 71 BPM | HEIGHT: 65 IN | TEMPERATURE: 97.6 F | DIASTOLIC BLOOD PRESSURE: 71 MMHG | WEIGHT: 184 LBS | SYSTOLIC BLOOD PRESSURE: 122 MMHG | BODY MASS INDEX: 30.66 KG/M2 | OXYGEN SATURATION: 99 % | RESPIRATION RATE: 20 BRPM

## 2022-12-30 RX ADMIN — HYDROCORTISONE 1 APPLICATION: 1 CREAM TOPICAL at 08:47

## 2022-12-30 RX ADMIN — DOCUSATE SODIUM 100 MG: 100 CAPSULE, LIQUID FILLED ORAL at 08:45

## 2022-12-30 RX ADMIN — IBUPROFEN 600 MG: 600 TABLET, FILM COATED ORAL at 06:25

## 2022-12-30 RX ADMIN — Medication 1 APPLICATION: at 08:45

## 2022-12-30 RX ADMIN — WITCH HAZEL 1 PAD: 500 SOLUTION RECTAL; TOPICAL at 08:47

## 2022-12-30 NOTE — PROGRESS NOTES
Progress Note - OB/GYN  Charito Benito 32 y o  female MRN: [id number]  Unit/Bed#: -01 Encounter: [id number]    Assessment and Plan     Charito Benito is a patient of: [facility]   She is PPD# 2 s/p  spontaneous vaginal delivery  Recovering well and is stable       Attention deficit disorder (ADD) in adult  Assessment & Plan  Not on medication    *  (spontaneous vaginal delivery)  Assessment & Plan  Lochia WNL   Recovering well   Appropriate bowel and bladder function   Pain well controlled   Tolerating diet   Breastfeeding   Ambulating without issues   No lower extremity tenderness  GBS negative   Rh positive         Disposition    - Anticipate discharge home on PPD# 2-3      Subjective/Objective     Chief Complaint: Postpartum State     Subjective:    Charito Benito is PPD#2 s/p  spontaneous vaginal delivery  She has no current complaints  Pain is well controlled  Patient is currently voiding  She is ambulating  Patient is currently passing flatus and has had bowel movement  She is tolerating PO, and denies nausea or vomitting  Patient denies fever, chills, chest pain, shortness of breath, or calf tenderness  Lochia is normal  She is  Breastfeeding  She is recovering well and is stable         Vitals:   /58 (BP Location: Left arm)   Pulse 77   Temp 98 2 °F (36 8 °C) (Oral)   Resp 20   Ht 5' 5" (1 651 m)   Wt 83 5 kg (184 lb)   LMP 2022   SpO2 99%   Breastfeeding Yes   BMI 30 62 kg/m²     No intake or output data in the 24 hours ending 22 0657    Invasive Devices     None                 Physical Exam:   GEN: Charito Benito appears well, alert and oriented x 3, pleasant and cooperative   CARDIO: RRR, no murmurs or rubs  RESP:  CTAB, no wheezes or rales  ABDOMEN: soft, no tenderness, no distention, fundus @ -1  EXTREMITIES: SCDs on, non tender, no erythema, b/l Nilda's sign negative      Labs:     Hemoglobin   Date Value Ref Range Status   2022 11 5 11 5 - 15 4 g/dL Final 10/31/2022 10 9 (L) 11 5 - 15 4 g/dL Final     WBC   Date Value Ref Range Status   12/27/2022 10 10 4 31 - 10 16 Thousand/uL Final   10/31/2022 9 96 4 31 - 10 16 Thousand/uL Final     Platelets   Date Value Ref Range Status   12/27/2022 327 149 - 390 Thousands/uL Final   10/31/2022 350 149 - 390 Thousands/uL Final     Creatinine   Date Value Ref Range Status   05/19/2022 0 64 0 60 - 1 30 mg/dL Final     Comment:     Standardized to IDMS reference method   11/05/2018 0 92 0 60 - 1 30 mg/dL Final     Comment:     Standardized to IDMS reference method     AST   Date Value Ref Range Status   05/19/2022 13 13 - 39 U/L Final     Comment:     Specimen collection should occur prior to Sulfasalazine administration due to the potential for falsely depressed results  11/14/2016 18 5 - 45 U/L Final     ALT   Date Value Ref Range Status   05/19/2022 5 (L) 7 - 52 U/L Final     Comment:     Specimen collection should occur prior to Sulfasalazine administration due to the potential for falsely depressed results      11/14/2016 19 12 - 78 U/L Final          Ramirez Gonzalez MD  12/30/2022  6:57 AM

## 2022-12-30 NOTE — PLAN OF CARE
Problem: PAIN - ADULT  Goal: Verbalizes/displays adequate comfort level or baseline comfort level  Description: Interventions:  - Encourage patient to monitor pain and request assistance  - Assess pain using appropriate pain scale  - Administer analgesics based on type and severity of pain and evaluate response  - Implement non-pharmacological measures as appropriate and evaluate response  - Consider cultural and social influences on pain and pain management  - Notify physician/advanced practitioner if interventions unsuccessful or patient reports new pain  Outcome: Adequate for Discharge     Problem: INFECTION - ADULT  Goal: Absence or prevention of progression during hospitalization  Description: INTERVENTIONS:  - Assess and monitor for signs and symptoms of infection  - Monitor lab/diagnostic results  - Monitor all insertion sites, i e  indwelling lines, tubes, and drains  - Monitor endotracheal if appropriate and nasal secretions for changes in amount and color  - [place] appropriate cooling/warming therapies per order  - Administer medications as ordered  - Instruct and encourage patient and family to use good hand hygiene technique  - Identify and instruct in appropriate isolation precautions for identified infection/condition  Outcome: Adequate for Discharge  Goal: Absence of fever/infection during neutropenic period  Description: INTERVENTIONS:  - Monitor WBC    Outcome: Adequate for Discharge     Problem: SAFETY ADULT  Goal: Patient will remain free of falls  Description: INTERVENTIONS:  - Educate patient/family on patient safety including physical limitations  - Instruct patient to call for assistance with activity   - Consult OT/PT to assist with strengthening/mobility   - Keep Call bell within reach  - Keep bed low and locked with side rails adjusted as appropriate  - Keep care items and personal belongings within reach  - Initiate and maintain comfort rounds  - Make Fall Risk Sign visible to staff  - Apply yellow socks and bracelet for high fall risk patients  - Consider moving patient to room near nurses station  Outcome: Adequate for Discharge  Goal: Maintain or return to baseline ADL function  Description: INTERVENTIONS:  -  Assess patient's ability to carry out ADLs; assess patient's baseline for ADL function and identify physical deficits which impact ability to perform ADLs (bathing, care of mouth/teeth, toileting, grooming, dressing, etc )  - Assess/evaluate cause of self-care deficits   - Assess range of motion  - Assess patient's mobility; develop plan if impaired  - Assess patient's need for assistive devices and provide as appropriate  - Encourage maximum independence but intervene and supervise when necessary  - Involve family in performance of ADLs  - Assess for home care needs following discharge   - Consider OT consult to assist with ADL evaluation and planning for discharge  - Provide patient education as appropriate  Outcome: Adequate for Discharge  Goal: Maintains/Returns to pre admission functional level  Description: INTERVENTIONS:  - Perform BMAT or MOVE assessment daily    - Set and communicate daily mobility goal to care team and patient/family/caregiver     - Collaborate with rehabilitation services on mobility goals if consulted  - Out of bed for toileting  - Record patient progress and toleration of activity level   Outcome: Adequate for Discharge     Problem: DISCHARGE PLANNING  Goal: Discharge to home or other facility with appropriate resources  Description: INTERVENTIONS:  - Identify barriers to discharge w/patient and caregiver  - Arrange for needed discharge resources and transportation as appropriate  - Identify discharge learning needs (meds, wound care, etc )  - Arrange for interpretive services to assist at discharge as needed  - Refer to Case Management Department for coordinating discharge planning if the patient needs post-hospital services based on physician/advanced practitioner order or complex needs related to functional status, cognitive ability, or social support system  Outcome: Adequate for Discharge     Problem: POSTPARTUM  Goal: Experiences normal postpartum course  Description: INTERVENTIONS:  - Monitor maternal vital signs  - Assess uterine involution and lochia  Outcome: Adequate for Discharge  Goal: Appropriate maternal -  bonding  Description: INTERVENTIONS:  - Identify family support  - Assess for appropriate maternal/infant bonding   -Encourage maternal/infant bonding opportunities  - Referral to  or  as needed  Outcome: Adequate for Discharge  Goal: Establishment of infant feeding pattern  Description: INTERVENTIONS:  - Assess breast/bottle feeding  - Refer to lactation as needed  Outcome: Adequate for Discharge  Goal: Incision(s), wounds(s) or drain site(s) healing without S/S of infection  Description: INTERVENTIONS  - Assess and document dressing, incision, wound bed, drain sites and surrounding tissue  - Provide patient and family education  Outcome: Adequate for Discharge

## 2022-12-30 NOTE — PLAN OF CARE
Problem: PAIN - ADULT  Goal: Verbalizes/displays adequate comfort level or baseline comfort level  Description: Interventions:  - Encourage patient to monitor pain and request assistance  - Assess pain using appropriate pain scale  - Administer analgesics based on type and severity of pain and evaluate response  - Implement non-pharmacological measures as appropriate and evaluate response  - Consider cultural and social influences on pain and pain management  - Notify physician/advanced practitioner if interventions unsuccessful or patient reports new pain  Outcome: Progressing     Problem: INFECTION - ADULT  Goal: Absence or prevention of progression during hospitalization  Description: INTERVENTIONS:  - Assess and monitor for signs and symptoms of infection  - Monitor lab/diagnostic results  - Monitor all insertion sites, i e  indwelling lines, tubes, and drains  - Monitor endotracheal if appropriate and nasal secretions for changes in amount and color  - [place] appropriate cooling/warming therapies per order  - Administer medications as ordered  - Instruct and encourage patient and family to use good hand hygiene technique  - Identify and instruct in appropriate isolation precautions for identified infection/condition  Outcome: Progressing  Goal: Absence of fever/infection during neutropenic period  Description: INTERVENTIONS:  - Monitor WBC    Outcome: Progressing     Problem: SAFETY ADULT  Goal: Patient will remain free of falls  Description: INTERVENTIONS:  - Educate patient/family on patient safety including physical limitations  - Instruct patient to call for assistance with activity   - Consult OT/PT to assist with strengthening/mobility   - Keep Call bell within reach  - Keep bed low and locked with side rails adjusted as appropriate  - Keep care items and personal belongings within reach  - Initiate and maintain comfort rounds  - Make Fall Risk Sign visible to staff  - Apply yellow socks and bracelet for high fall risk patients  - Consider moving patient to room near nurses station  Outcome: Progressing  Goal: Maintain or return to baseline ADL function  Description: INTERVENTIONS:  -  Assess patient's ability to carry out ADLs; assess patient's baseline for ADL function and identify physical deficits which impact ability to perform ADLs (bathing, care of mouth/teeth, toileting, grooming, dressing, etc )  - Assess/evaluate cause of self-care deficits   - Assess range of motion  - Assess patient's mobility; develop plan if impaired  - Assess patient's need for assistive devices and provide as appropriate  - Encourage maximum independence but intervene and supervise when necessary  - Involve family in performance of ADLs  - Assess for home care needs following discharge   - Consider OT consult to assist with ADL evaluation and planning for discharge  - Provide patient education as appropriate  Outcome: Progressing  Goal: Maintains/Returns to pre admission functional level  Description: INTERVENTIONS:  - Perform BMAT or MOVE assessment daily    - Set and communicate daily mobility goal to care team and patient/family/caregiver     - Collaborate with rehabilitation services on mobility goals if consulted  - Out of bed for toileting  - Record patient progress and toleration of activity level   Outcome: Progressing     Problem: DISCHARGE PLANNING  Goal: Discharge to home or other facility with appropriate resources  Description: INTERVENTIONS:  - Identify barriers to discharge w/patient and caregiver  - Arrange for needed discharge resources and transportation as appropriate  - Identify discharge learning needs (meds, wound care, etc )  - Arrange for interpretive services to assist at discharge as needed  - Refer to Case Management Department for coordinating discharge planning if the patient needs post-hospital services based on physician/advanced practitioner order or complex needs related to functional status, cognitive ability, or social support system  Outcome: Progressing     Problem: POSTPARTUM  Goal: Experiences normal postpartum course  Description: INTERVENTIONS:  - Monitor maternal vital signs  - Assess uterine involution and lochia  Outcome: Progressing  Goal: Appropriate maternal -  bonding  Description: INTERVENTIONS:  - Identify family support  - Assess for appropriate maternal/infant bonding   -Encourage maternal/infant bonding opportunities  - Referral to  or  as needed  Outcome: Progressing  Goal: Establishment of infant feeding pattern  Description: INTERVENTIONS:  - Assess breast/bottle feeding  - Refer to lactation as needed  Outcome: Progressing  Goal: Incision(s), wounds(s) or drain site(s) healing without S/S of infection  Description: INTERVENTIONS  - Assess and document dressing, incision, wound bed, drain sites and surrounding tissue  - Provide patient and family education    Outcome: Progressing

## 2022-12-30 NOTE — LACTATION NOTE
This note was copied from a baby's chart  Met with mother to go over discharge breastfeeding booklet including the feeding log  Emphasized 8 or more (12) feedings in a 24 hour period, what to expect for the number of diapers per day of life and the progression of properties of the  stooling pattern  Reviewed breastfeeding and your lifestyle, storage and preparation of breast milk, how to keep you breast pump clean, the employed breastfeeding mother and paced bottle feeding handouts  Booklet included Breastfeeding Resources for after discharge including access to the number for the [address]  Discussed this as the best resource to contact for questions or concerns regarding breasts,  feedings, and breastmilk  Discussed s/s engorgement and how to manage with medications, additional feedings at the breast or pumping sessions as needed, and cool compresses as well as s/s and management of mastitis and when to contact physician  Reviewed booklet and feeding log, addressed questions related to DC teaching  Enc family to continue to feed the baby on demand, look for signs of effective breastfeed like audible swallows, strong but comfortable tugging while latched, breasts softening (after milk comes in), baby falling asleep and releasing the breast, and meeting daily diaper goals  Mom reports tender nipples, lanolin provided  Enc her to call for latching support PTD  She has worked with lactation while she's been here and is feeding more confident  Appt made at [facility] for ongoing support

## 2022-12-30 NOTE — LACTATION NOTE
This note was copied from a baby's chart    Donor milk sent home with family    [phone]    Exp 6/6/23

## 2023-01-03 LAB — PLACENTA IN STORAGE: NORMAL

## 2023-01-05 ENCOUNTER — OFFICE VISIT (OUTPATIENT)
Dept: POSTPARTUM | Facility: CLINIC | Age: 28
End: 2023-01-05

## 2023-01-05 DIAGNOSIS — Z71.89 ENCOUNTER FOR BREAST FEEDING COUNSELING: Primary | ICD-10-CM

## 2023-01-05 NOTE — PATIENT INSTRUCTIONS
GOOD RESOURCES FOR EDUCATION:  [facility] - a lot of different types of videos  [employer]/hand expression:  this is a very good skill to learn! [employer]  org : Click on "Exposures" then on pregnancy and breastfeeding  Then click on "baby fact sheets " As a consumer you can do a live chat, email or call  Physicians Guide to Breastfeeding by Dr Eleazar Nazario  You will find information on lymphatic drainage  [employer]  org - videos and handouts     Breastfeeding parent, get yourself comfortable first   Sit back  It helps to put a stool under your feet  Bring baby to you  Your baby should be tummy to tummy with you  Be patient if your baby is putting his/her hands in their mouth - this helps them to get oriented  Gently guide hands so they touch either side of the breast  Babies like to be "belly feeders", it helps them to be posturally stable  Baby's ears, shoulders and hips should be in a straight line but it helps to flex the legs  Try starting with the cross cradle hold  The baby's neck should rest between your thumb and index finger  Your forearm is supporting the baby's body against yours  With the hand that is on the same side as the breast you are latching to, bring your hand all the way under your breast and create compression well behind the aereola  You will know your hand position is correct if your thumb is parallel with the baby's top lip  Compress your breast to allow baby to get the maximum amount breast tissue  Blake James your nipple to the area between the nose and upper lip  Tickle this area to elicit the "rooting" reflex  Guide the baby's chin so that it touches the underside of the breast first    When the baby opens widely, press the baby on quickly by applying pressure with the heal of your hand between the baby's shoulder blades  If it doesn't feel right, unlatch baby and try again    Once you feel the baby is latched, you can let go of your breast, bring your arm around (that has been compressing the breast) and under the baby  This is a traditional "cradle hold "  If you lean back to about a 45 degree angle, you  can lay the baby on top of you - guide his/her hands to wrap around your breast   The baby will be at an angle and will look like you're wearing a "sash " Baby may need less help in this position  Always try to offer both breasts  There is usually a break in between breasts that can sometimes last 30 minutes  If you lay the the baby down on his/her back after just one breast, they will almost always wake within about 10 minutes  Then you can offer the second breast  If the baby falls asleep after one breast and stays asleep, just offer the other breast at the next feed  Remember, most newborns prefer to be held and particularly like to be held chest to chest with a parent  You cannot spoil your ! PUMPING:    When pumping, begin in stimulation mode (high cycle, low vacuum) until milk begins to express  Change pump to expression mode (low cycle, high vacuum)  Use hands on pumping techniques to assist with milk transfer  When milk stops expressing, change back to stimulation mode  When milk begins to flow, change to expression mode  You may cycle pump up to three times in a pumping session  The correct flange size is one that is comfortable and allows pumping to be effective  Pump every 3-3 5 hours no longer than 20 minutes  Practice skin to skin as much as you are able to  Please call us with any questions or concerns

## 2023-01-05 NOTE — PROGRESS NOTES
INITIAL BREAST FEEDING EVALUATION    Informant/Relationship: Mother and maternal grandmother    Discussion of General Lactation Issues: She has not really latched since she  was in the hospital  Mom has been pumping and bottle feeding  She doesn't care too much about the baby latching, but is ok with trying today  She felt that the baby was just shoved onto her in the hospital and was turned off by this  She wants to figure out how to "do everything"    Infant is 6days old today   History:  Fertility Problem: Prior to this pregnancy  Breast changes: Yes  : Yes  Full term:Yes   labor:No  First nursing/attempt < 1 hour after birth:  Skin to skin following delivery: yes  Breast changes after delivery:yes  Rooming in (infant in room with mother with exception of procedures, eg  Circumcision: Yes  Blood sugar issues:  NICU stay: No  Jaundice:Yes  Phototherapy:No  Supplement given: (list supplement and method used as well as reason(s):donor milk    No past medical history on file        Current Outpatient Medications:   •  Cholecalciferol (Aqueous Vitamin D) 10 MCG/ML LIQD, Take 1 mL by mouth daily, Disp: 50 mL, Rfl: 6  •  simethicone (MYLICON) 40 MQ/8 3 mL drops, Take 40 mg by mouth 4 (four) times a day as needed for flatulence, Disp: , Rfl:     No Known Allergies    Social History     Substance and Sexual Activity   Drug Use Not on file       Social History     Interval Breastfeeding History:    Frequency of breast feedin-3 hours  Does mother feel breastfeeding is effective: no  Does infant appear satisfied after nursing:No  Stooling pattern normal: Yes  Urinating frequently:Yes  Using shield or shells: No    Alternative/Artificial Feedings:   Bottle: Yes/Nuk  Cup: No  Syringe/Finger: No           Formula Type: n/a                     Amount: n/a            Breast Milk:                      Amount: 2-2 5            Frequency Q 2-3 Hr between feedings  Elimination Problems: No      Equipment:  Nipple Shield             Type: n/a             Size: n/a             Frequency of Use: n/a  Pump            Type: spectra 9            Frequency of Use: 4-5 times per day  Shells            Type: n/a            Frequency of use: n/a    Equipment Problems: Feels like it doesn't always suck on one side  Was not aware of how to use the pump correctly  She has found that a 28mm flange feels the best    Mom:  Breast: Medium size, full  Nipple Assessment in General: Bilaterally everted  Mother's Awareness of Feeding Cues                 Recognizes: Yes                  Verbalizes: Yes  Support System:  and maternal grandmother  History of Breastfeeding: No  Changes/Stressors/Violence: Pumping  Mom feels like she's just feeding and pumping  Concerns/Goals: Doesn't love pumping but doesn't want to give formula and wants to be able to give the baby bottles  Mom would like baby to latch at night and bottle feed during the day    Problems with Mom: difficulty latching baby      Infant:  Behaviors: awake, alert  Color: Yellow from torso up  Birth weight: 8 lbs 4 8oz  Current weight: 7 lbs 10 6 oz  After feed she was 7lbs 11 3 oz    Problems with infant:A little difficult to latch      Infant Assessment:  Baby lateralizes tongue well  Tip of tongue lifts to mid point  Tongue extension goes over lower gumline  Good spread of anterior tongue  She cups well  Good peristalsis, no snap back  Tongue is round when lifted  Less than 1cm of frenulum noted and attached on the floor of mouth  [place] Latch:  Efficiency:               Lips Flanged: Yes              Depth of latch: good once she is latched              Audible Swallow: Yes              Visible Milk: yes              Wide Open/ Asymmetrical: Yes              Suck Swallow Cycle: Breathing: normal Coordinated: good  Nipple Assessment after latch: Normal  No damage  Latch Problems:  It took quite awhile for her to settle in, but in an asymmetric position, with nose aimed at nipple, mom was able to get her on well    Position:  Infant's Ergonomics/Body               Body Alignment: good               Head Supported: Yes               Close to Mom's body/ Lifted/ Supported: Yes               Mom's Ergonomics/Body: good, leaning back                           Supported: Yes                           Sitting Back:Leaning back today                           Brings Baby to her breast: Yes  Positioning Problems: Mom has difficulty with her wrists so using a cross cradle hold is painful          Education:  Reviewed Latch: Yes  Reviewed Positioning for Dyad: Yes  Reviewed Frequency/Supply & Demand: Yes  Reviewed Infant:Cues and varied States of Awareness: Yes  Reviewed Infant Elimination: Yes  Reviewed Alternative/Artificial Feedings: Yes  Reviewed Mom/Breast care: Yes  Reviewed Equipment: Yes  Discussed settings on pump and length of pumping  Also talked through a plan for moving forward      Plan: Mom was given a lot of encouragement to continue to baby to breast   She did a great job latching her today  She is most likely going to continue to pump during the day and bottle feed and directly breast feed at night  I encouraged her to not think too far into the future as it can feel overwhelming    I have spent 60 minutes with family today in which greater than 50% of this time was spent in counseling/coordination of care regarding patient and family education

## 2023-01-06 NOTE — PROGRESS NOTES
I have reviewed the notes, assessments, and/or procedures performed by ORALIA Javier, I concur with her/his documentation of Padilla Hackett MD 01/05/23

## 2023-01-06 NOTE — UTILIZATION REVIEW
NOTIFICATION OF ADMISSION DISCHARGE   This is a Notification of Discharge from [address]  Please be advised that this patient has been discharge from our facility  Below you will find the admission and discharge date and time including the patient’s disposition  UTILIZATION REVIEW CONTACT:  Taylor Gaffney  Utilization   Network Utilization Review Department  Phone: [phone] x carefully listen to the prompts  All voicemails are confidential   Email: [email or web address]     ADMISSION INFORMATION  PRESENTATION DATE: 12/27/2022 10:17 AM  OBERVATION ADMISSION DATE:   INPATIENT ADMISSION DATE: 12/27/22 11:32 AM   DISCHARGE DATE: 12/30/2022  3:29 PM   DISPOSITION:Home/Self Care    IMPORTANT INFORMATION:  Send all requests for admission clinical reviews, approved or denied determinations and any other requests to dedicated fax number below belonging to the campus where the patient is receiving treatment   List of dedicated fax numbers:  [address] DENIALS (Administrative/Medical Necessity) [phone]   [address] (Maternity/NICU/Pediatrics) [phone]   [facility] [phone]   [facility] [phone]   [address] [phone]   [address] [facility] [phone]   [address] [place] [address] [phone]   [address] [place] [address] 119 [phone]   [facility]  [phone]   [address] [phone]   412 [facility] [address] [facility] [phone]

## 2023-01-27 ENCOUNTER — OFFICE VISIT (OUTPATIENT)
Dept: POSTPARTUM | Facility: CLINIC | Age: 28
End: 2023-01-27

## 2023-01-27 DIAGNOSIS — Z71.89 ENCOUNTER FOR BREAST FEEDING COUNSELING: Primary | ICD-10-CM

## 2023-01-27 NOTE — PATIENT INSTRUCTIONS
BREAST FEEDING FOLLOW UP VISIT    Informant/Relationship: Mother    Discussion of General Lactation Issues: Not sure if she's using the correct flange size  She's been keeping a little ahead of her but thinks her production has decreased a little  Would like to get her production back up  Baby is spitting up a lot  It does not seem to bother Meredith  Baby sleeps in a bassinet  She is starting to roll  Infant is 2 weeks old today  Interval Breastfeeding History:    Frequency of breast feeding: One time in the night  But she will also put her on for comfort  Does mother feel breastfeeding is effective: Yes  Does infant appear satisfied after nursing:yes  Stooling pattern normal: yes, but has had 4 green, pasty stools in the past two days  Urinating frequently: yes  Using shield or shells:n/a    Alternative/Artificial Feedings:   Bottle: Nuk  Cup: n/a  Syringe/Finger: n/a           Formula Type: n/a                     Amount: n/a            Breast Milk:                      Amount: 3 oz            Frequency Q 3  Hr between feedings  Elimination Problems: no      Equipment:  Nipple Shield             Type: n/a             Size: n/a             Frequency of Use: n/a  Pump            Type: Spectra and Mom Cozy            Frequency of Use: q 3 hours  Edelmira is not loving the spectra  She gets more milk and is more comfortable with the Mom Cozy    Shells            Type: n/a            Frequency of use: n/a    Equipment Problems: not sure about flange  I measured her today and she is using (technically) the right size  Suggested she put a small amount of coconut or olive oil right at the opening of the tunnel for more comfort    Mom:  Breast: Medium/large, soft  Mom states that she leaks all of the time and is very bothered by this  Nipple Assessment in General: Bilaterally everted    They both look mildly irritated but no obvious cracking  Mother's Awareness of Feeding Cues                 Recognizes: yes                  Verbalizes: yes  Support System:   History of Breastfeeding: no  Changes/Stressors/Violence: Has to go back to work in two weeks  Concerns/Goals: Feels production has gone down a bit  Problems with Mom: She'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) She is conflicted about all of it  She would prefer not to give formula  Mom used to take Zoloft and Adderall  I suggested she talk with our counselor and join our online support group        Infant:  Behaviors: awake, alert, crying  Color: pink, warm, dry  Birth weight: 8 lbs 5 oz  Current weight: 8 lbs 12 oz    Problems with infant: None    Infant exam: Mom was here to discuss pumping and managing this with work  She did end up latching the baby today    [place] Latch:  Efficiency:               Lips Flanged: yes              Depth of latch: good              Audible Swallow: yes              Visible Milk: yes              Wide Open/ Asymmetrical: yes              Suck Swallow Cycle: Breathing: normal Coordinated: yes  Nipple Assessment after latch: normal  Latch Problems: none    Position:  Infant's Ergonomics/Body               Body Alignment: no, helped mom adjust her position               Head Supported: yes               Close to Mom's body/ Lifted/ Supported: Not at first but helped her guide the baby closer to her body               Mom's Ergonomics/Body: good                           Supported: yes                           Sitting Back: yes                           Brings Baby to her breast:not entirely  This was adjusted today  Positioning Problems: Mom had baby laying a bit on her back and head turned towards the breast   Adjustments were made            Education:  Reviewed Latch: Yes  Reviewed Positioning for Dyad: yes  Reviewed Frequency/Supply & Demand:yes  Reviewed Infant:Cues and varied States of Awareness: yes  Reviewed Infant Elimination: yes  Reviewed Alternative/Artificial Feedings: Yes  Reviewed Mom/Breast care: yes  Reviewed Equipment: Yes  Discussed how to make pumping more comfortable      Plan:  Mom is going to continue to do what she is doing  I suggested that she is making plenty of milk and it is not preferred to get into a state of over production  We discussed at length what her thoughts are about all of this and that she has to make her own decisions about what is best for her and her family  Suggested she consider speaking to our counselor and also to join our Monday night support group  I have spent  60 minutes with family today in which greater than 50% of this time was spent in counseling/coordination of care regarding Patient and family education

## 2023-01-27 NOTE — PROGRESS NOTES
BREAST FEEDING FOLLOW UP VISIT    Informant/Relationship: Mother    Discussion of General Lactation Issues: Not sure if she's using the correct flange size  She's been keeping a little ahead of her but thinks her production has decreased a little  Would like to get her production back up  Baby is spitting up a lot  It does not seem to bother Meredith  Baby sleeps in a bassinet  She is starting to roll  Infant is 2 weeks old today  Interval Breastfeeding History:    Frequency of breast feeding: One time in the night  But she will also put her on for comfort  Does mother feel breastfeeding is effective: Yes  Does infant appear satisfied after nursing:yes  Stooling pattern normal: yes, but has had 4 green, pasty stools in the past two days  Urinating frequently: yes  Using shield or shells:n/a    Alternative/Artificial Feedings:   Bottle: Nuk  Cup: n/a  Syringe/Finger: n/a           Formula Type: n/a                     Amount: n/a            Breast Milk:                      Amount: 3 oz            Frequency Q 3  Hr between feedings  Elimination Problems: no      Equipment:  Nipple Shield             Type: n/a             Size: n/a             Frequency of Use: n/a  Pump            Type: Spectra and Mom Cozy            Frequency of Use: q 3 hours  Ed[place]ira is not loving the spectra  She gets more milk and is more comfortable with the Mom Cozy    Shells            Type: n/a            Frequency of use: n/a    Equipment Problems: not sure about flange  I measured her today and she is using (technically) the right size  Suggested she put a small amount of coconut or olive oil right at the opening of the tunnel for more comfort    Mom:  Breast: Medium/large, soft  Mom states that she leaks all of the time and is very bothered by this  Nipple Assessment in General: Bilaterally everted    They both look mildly irritated but no obvious cracking  Mother's Awareness of Feeding Cues                 Recognizes: yes                  Verbalizes: yes  Support System:   History of Breastfeeding: no  Changes/Stressors/Violence: Has to go back to work in two weeks  Concerns/Goals: Feels production has gone down a bit  Problems with Mom: She'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) She is conflicted about all of it  She would prefer not to give formula  Mom used to take Zoloft and Adderall  I suggested she talk with our counselor and join our online support group        Infant:  Behaviors: awake, alert, crying  Color: pink, warm, dry  Birth weight: 8 lbs 5 oz  Current weight: 8 lbs 12 oz    Problems with infant: None    Infant exam: Mom was here to discuss pumping and managing this with work  She did end up latching the baby today    [place] Latch:  Efficiency:               Lips Flanged: yes              Depth of latch: good              Audible Swallow: yes              Visible Milk: yes              Wide Open/ Asymmetrical: yes              Suck Swallow Cycle: Breathing: normal Coordinated: yes  Nipple Assessment after latch: normal  Latch Problems: none    Position:  Infant's Ergonomics/Body               Body Alignment: no, helped mom adjust her position               Head Supported: yes               Close to Mom's body/ Lifted/ Supported: Not at first but helped her guide the baby closer to her body               Mom's Ergonomics/Body: good                           Supported: yes                           Sitting Back: yes                           Brings Baby to her breast:not entirely  This was adjusted today  Positioning Problems: Mom had baby laying a bit on her back and head turned towards the breast   Adjustments were made            Education:  Reviewed Latch: Yes  Reviewed Positioning for Dyad: yes  Reviewed Frequency/Supply & Demand:yes  Reviewed Infant:Cues and varied States of Awareness: yes  Reviewed Infant Elimination: yes  Reviewed Alternative/Artificial Feedings: Yes  Reviewed Mom/Breast care: yes  Reviewed Equipment: Yes  Discussed how to make pumping more comfortable      Plan:  Mom is going to continue to do what she is doing  I suggested that she is making plenty of milk and it is not preferred to get into a state of over production  We discussed at length what her thoughts are about all of this and that she has to make her own decisions about what is best for her and her family  Suggested she consider speaking to our counselor and also to join our Monday night support group  I have spent  60 minutes with family today in which greater than 50% of this time was spent in counseling/coordination of care regarding Patient and family education

## 2023-01-29 NOTE — PROGRESS NOTES
I have reviewed the notes, assessments, and/or procedures performed by Irene Solomon IBJOLENE, I concur with her/his documentation of Calixto Black MD 01/29/23

## 2023-02-03 ENCOUNTER — POSTPARTUM VISIT (OUTPATIENT)
Dept: OBGYN CLINIC | Facility: CLINIC | Age: 28
End: 2023-02-03

## 2023-02-03 VITALS
HEIGHT: 65 IN | WEIGHT: 167 LBS | BODY MASS INDEX: 27.82 KG/M2 | DIASTOLIC BLOOD PRESSURE: 76 MMHG | SYSTOLIC BLOOD PRESSURE: 118 MMHG

## 2023-02-03 DIAGNOSIS — F32.A ANXIETY AND DEPRESSION: ICD-10-CM

## 2023-02-03 DIAGNOSIS — F41.9 ANXIETY AND DEPRESSION: ICD-10-CM

## 2023-02-03 NOTE — PROGRESS NOTES
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    Lochia is no bleeding  Patient has not been sexually active  Desired contraception method is IUD    [place] score: 12--no SI/HI  Missed therapy appt, but desires so will re schedule  Wants to restart zoloft  Will discuss with PCP about adderall    Gestational Diabetes: Na  Gestational HTN/Preeclampsia: NA      The following portions of the patient's history were reviewed and updated as appropriate: She  has a past medical history of Acne, Anxiety, Carpal tunnel syndrome, Closed nondisplaced fracture of middle phalanx of right ring finger (2018), and Concussion with loss of consciousness (2022)  She  has a past surgical history that includes [place] tooth extraction; Colonoscopy; Rhinoplasty (); Examination under anesthesia (N/A, 2018); REMOVAL OF INTRAUTERINE DEVICE (IUD) (N/A, 2018); and pr open tx phalangeal shaft fracture prox/middle ea (Right, 2018)    Current Outpatient Medications on File Prior to Visit   Medication Sig   • Multiple Vitamin (MULTI VITAMIN DAILY PO) Take by mouth   • acetaminophen (TYLENOL) 325 mg tablet Take 2 tablets (650 mg total) by mouth every 4 (four) hours as needed for mild pain (Patient not taking: Reported on 2/3/2023)   • docusate sodium (COLACE) 100 mg capsule Take 1 capsule (100 mg total) by mouth 2 (two) times a day as needed for constipation (Patient not taking: Reported on 2/3/2023)   • ibuprofen (MOTRIN) 600 mg tablet Take 1 tablet (600 mg total) by mouth every 6 (six) hours as needed for mild pain (Patient not taking: Reported on 2/3/2023)   • senna (SENOKOT) 8 6 mg Take 1 tablet (8 6 mg total) by mouth 2 (two) times a day as needed for constipation (Patient not taking: Reported on 2/3/2023)     No current facility-administered medications on file prior to visit  She is allergic to amoxicillin, zithromax [azithromycin], and latex         Current Outpatient Medications:   •  Multiple Vitamin (MULTI VITAMIN DAILY PO), Take by mouth, Disp: , Rfl:   •  sertraline (Zoloft) 50 mg tablet, Take 1 tablet (50 mg total) by mouth daily For first week, take half a tablet at night, then increase to one tablet at night , Disp: 30 tablet, Rfl: 1  •  acetaminophen (TYLENOL) 325 mg tablet, Take 2 tablets (650 mg total) by mouth every 4 (four) hours as needed for mild pain (Patient not taking: Reported on 2/3/2023), Disp: , Rfl: 0  •  docusate sodium (COLACE) 100 mg capsule, Take 1 capsule (100 mg total) by mouth 2 (two) times a day as needed for constipation (Patient not taking: Reported on 2/3/2023), Disp: 30 capsule, Rfl: 1  •  ibuprofen (MOTRIN) 600 mg tablet, Take 1 tablet (600 mg total) by mouth every 6 (six) hours as needed for mild pain (Patient not taking: Reported on 2/3/2023), Disp: 30 tablet, Rfl: 0  •  senna (SENOKOT) 8 6 mg, Take 1 tablet (8 6 mg total) by mouth 2 (two) times a day as needed for constipation (Patient not taking: Reported on 2/3/2023), Disp: 30 tablet, Rfl: 1    Allergies   Allergen Reactions   • Amoxicillin Anaphylaxis   • Zithromax [Azithromycin] Anaphylaxis   • Latex Rash       Review of Systems  Constitutional: no fever, feels well  Breasts: no complaints of breast pain, breast lump, or nipple discharge  Gastrointestinal: no complaints nausea, vomiting  Genitourinary: as noted in HPI  Neurological: no complaints of headache    Objective    /76 (BP Location: Left arm, Patient Position: Sitting, Cuff Size: Standard)   Ht 5' 5" (1 651 m)   Wt 75 8 kg (167 lb)   LMP 03/29/2022   Breastfeeding Yes Comment: breast and bottle  BMI 27 79 kg/m²   Physical Exam  Constitutional:       Appearance: Normal appearance   She is normal weight  HENT:      Head: Normocephalic  Eyes:      Extraocular Movements: Extraocular movements intact  Conjunctiva/sclera: Conjunctivae normal    Pulmonary:      Effort: Pulmonary effort is normal    Abdominal:      General: Abdomen is flat  There is no distension  Palpations: Abdomen is soft  Tenderness: There is no abdominal tenderness  Genitourinary:     Comments: Vulva: normal, no lesions  Vagina: normal, no lesions or ttp  Urethra: normal, no lesions, masses or ttp  Bladder: normal, no masses or ttp  Cervix: normal, no lesions, masses or CMT  Uterus: normal-size, normal mobility, good descensus  Adnexa: no masses or ttp  Musculoskeletal:         General: Normal range of motion  Cervical back: Normal range of motion  Skin:     General: Skin is warm and dry  Neurological:      General: No focal deficit present  Psychiatric:         Mood and Affect: Mood normal          Behavior: Behavior normal          Thought Content: Thought content normal          Assessment/Plan:  Fortunato Hong is a 32 y o  who is postpartum from an  with a postpartum examination notable for PPD  1  Contraception: IUD (Mirena)  2  Annual exam due in summer; Last Pap : 2022--  [facility] information discussed  Anxiety/depression, also history of anxiety/depression, No si/hi, zoloft restarted, provided MYRNA and crisis hotline information  4  Increase activity as tolerated, may resume all normal activity

## 2023-05-17 NOTE — PROGRESS NOTES
[facility]   Annual Well Woman Exam  [place]    ASSESSMENT & PLAN: Thee Parikh is a 32 y o  [id number] (1xSVD) with gynecologic exam notable for left breast rash  1   Routine well woman exam done today  2   Cervical Cancer Screening: Pap with reflex HPV was not done today  Current ASCCP Guidelines reviewed  3   Thee Parikh is low risk and does not need or desire STD testing  4   Gardasil is recommended for patients from 545 years of age  Thee Parikh has had the Gardasil vaccine series  [facility] is sexually active  She is currently using abstinence as contraception and options have been discussed  , interested in Mirena IUD has had before a few times and would like something that wont impact milk production or weight gain: reviewed POP, depo, IUD, less so nuvaring, patch, will request IUD appt, aware to abstain for 2 weeks prior to insertion   6  The following were reviewed in today's visit: topical steroid for left breast rash, which seems like contact irritant-- if no improvement or any worsening would recommend breast imaging (left breast ultrasound ordered)  7  Return to office in 12 months for annual      All questions answered to the best of my ability  Subjective:    CC:  Annual Gynecologic Examination    HPI: Jason Tim is a 32 y o  [id number] who presents for annual gynecologic examination  Jason Tim is a 32 y o  [id number] female who presents for a postpartum visit  She is s/p  at 39w1d on 23 (w/ Christin Carvajal)    zoloft 50mg QD, but stopped, doing well, feels safe with partner    Screening:  Pap: 2022  Gardasil: completed 13 and 14    GYN  no pelvic pain, dyspareunia  Sexually active: stable, monogamous partner, has had 3 times since delivery, no pain, but nervous about unintended pregnancy  Birth control: none would like to another Mirena  Hx STI: denies     OB  [id number]     G/U  Complaints: denies  Denies hematuria, urinary incontinence and dysuria    Breast  Complaints: rash over left breast, pruritic, few days, no lumps or unusual nipple discharge    Family hx: reports maternal grandmother--breast cancer, mother ok  Paternal grandmother--colon cancer, father ok  Will ask about genetic testing    Health Maintenance:    She exercises--has a very active lifestyle every day  She feels safe at home and denies domestic violence    She does follow a well balanced diet  She does not use tobacco and minimal alcohol      Past Medical History:   Diagnosis Date   • Acne    • Anxiety    • Carpal tunnel syndrome    • Closed nondisplaced fracture of middle phalanx of right ring finger 12/8/2018   • Concussion with loss of consciousness 2/16/2022       Past Surgical History:   Procedure Laterality Date   • COLONOSCOPY     • EXAMINATION UNDER ANESTHESIA N/A 11/6/2018    Procedure: EXAM UNDER ANESTHESIA (EUA)  WITH IUD REMOVAL;  Surgeon: Va Domingo DO;  [facility];  Service: Gynecology   • VA OPEN TX PHALANGEAL SHAFT FRACTURE PROX/MIDDLE EA Right 12/19/2018    Procedure: CLOSED REDUCTION PERCUTANEOUS PINNING P2 RIGHT RING FINGER;  Surgeon: Sheeba David DO;  Location: [address] [place] [address];  Service: Orthopedics   • REMOVAL OF INTRAUTERINE DEVICE (IUD) N/A 11/6/2018    Procedure: REMOVAL OF INTRAUTERINE DEVICE (IUD); Surgeon: Va Domingo DO;  Location: [facility];  Service: Gynecology   • RHINOPLASTY  2013   • WISDOM TOOTH EXTRACTION         Past OB/Gyn History:     No LMP recorded (lmp unknown)      Family History:  Family History   Problem Relation Age of Onset   • Hypertension Mother    • Lymphoma Father    • No Known Problems Sister    • No Known Problems Brother    • Asthma Maternal Grandmother    • Diabetes Maternal Grandfather    • Colon cancer Paternal Grandmother         type 1 diabetes   • No Known Problems Paternal Grandfather    • No Known Problems Maternal Aunt    • No Known Problems Maternal Uncle    • No Known Problems Paternal Aunt    • No Known Problems Paternal Uncle    • ADD / ADHD Neg Hx    • Anesthesia problems Neg Hx    • Cancer Neg Hx    • Clotting disorder Neg Hx    • Collagen disease Neg Hx    • Dislocations Neg Hx    • Learning disabilities Neg Hx    • Neurological problems Neg Hx    • Osteoporosis Neg Hx    • Rheumatologic disease Neg Hx    • Scoliosis Neg Hx    • Vascular Disease Neg Hx        Social History:  Social History     Socioeconomic History   • Marital status: /Civil Union     Spouse name: Not on file   • Number of children: Not on file   • Years of education: Not on file   • Highest education level: Not on file   Occupational History   • Not on file   Tobacco Use   • Smoking status: Never   • Smokeless tobacco: Never   Vaping Use   • Vaping Use: Never used   Substance and Sexual Activity   • Alcohol use: Yes     Comment: occ   • Drug use: No   • Sexual activity: Yes     Partners: Male     Birth control/protection: Coitus interruptus   Other Topics Concern   • Not on file   Social History Narrative   • Not on file     Social Determinants of Health     Financial Resource Strain: Not on file   Food Insecurity: Not on file   Transportation Needs: Not on file   Physical Activity: Not on file   Stress: Not on file   Social Connections: Not on file   Intimate Partner Violence: Not on file   Housing Stability: Not on file       Allergies   Allergen Reactions   • Amoxicillin Anaphylaxis   • Zithromax [Azithromycin] Anaphylaxis   • Latex Rash       Current Outpatient Medications:   •  hydrocortisone 1 % ointment, Apply topically 2 (two) times a day, Disp: 28 g, Rfl: 0  •  acetaminophen (TYLENOL) 325 mg tablet, Take 2 tablets (650 mg total) by mouth every 4 (four) hours as needed for mild pain (Patient not taking: Reported on 2/3/2023), Disp: , Rfl: 0  •  docusate sodium (COLACE) 100 mg capsule, Take 1 capsule (100 mg total) by mouth 2 (two) times a day as needed for constipation (Patient not taking: Reported on 2/3/2023), Disp: 30 capsule, Rfl: 1  •  ibuprofen (MOTRIN) 600 mg tablet, Take 1 tablet (600 mg total) by mouth every 6 (six) hours as needed for mild pain (Patient not taking: Reported on 2/3/2023), Disp: 30 tablet, Rfl: 0  •  Multiple Vitamin (MULTI VITAMIN DAILY PO), Take by mouth (Patient not taking: Reported on 5/18/2023), Disp: , Rfl:   •  senna (SENOKOT) 8 6 mg, Take 1 tablet (8 6 mg total) by mouth 2 (two) times a day as needed for constipation (Patient not taking: Reported on 2/3/2023), Disp: 30 tablet, Rfl: 1  •  sertraline (Zoloft) 50 mg tablet, Take 1 tablet (50 mg total) by mouth daily For first week, take half a tablet at night, then increase to one tablet at night  (Patient not taking: Reported on 5/18/2023), Disp: 30 tablet, Rfl: 1    Review of Systems:  Denies chest pain, shortness of breath, abdominal pain, nausea, vomiting, urinary incontinence, urinary frequency, vaginal bleeding, vaginal discharge  All other systems negative unless otherwise stated  Physical Exam:  /68 (BP Location: Left arm, Patient Position: Sitting, Cuff Size: Standard)   Wt 80 3 kg (177 lb)   LMP  (LMP Unknown)   Breastfeeding No   BMI 29 45 kg/m²  Body mass index is 29 45 kg/m²  GEN: The patient was alert and oriented x3, pleasant well-appearing female in no acute distress  HEENT:  Unremarkable, no anterior or posterior lymphadenopathy, no thyromegaly  CV:  Regular rate and rhythm, normal S1 and S2, no murmurs  RESP:  Clear to auscultation bilaterally, no wheezes, rales or rhonchi  BREAST:  Erythematous patches over left breast  Symmetric breasts with no palpable breast masses or obvious breast lesions  She has no retractions or nipple discharge  She has no axillary abnormalities or palpable masses  GI:  Soft, nontender, non-distended  MSK: bilateral lower extremities are nontender, no edema  : Normal appearing external female genitalia, normal appearing urethral meatus   On sterile speculum exam, normal appearing vaginal epithelium, no vaginal discharge, no bleeding, grossly normal appearing cervix  On bimanual exam, no cervical motion tenderness; uterus is smooth, mobile, non-tender, normal size  No tenderness or fullness in the bilateral adnexa

## 2023-05-18 ENCOUNTER — ANNUAL EXAM (OUTPATIENT)
Dept: OBGYN CLINIC | Facility: CLINIC | Age: 28
End: 2023-05-18

## 2023-05-18 ENCOUNTER — TELEPHONE (OUTPATIENT)
Dept: OBGYN CLINIC | Facility: CLINIC | Age: 28
End: 2023-05-18

## 2023-05-18 VITALS — SYSTOLIC BLOOD PRESSURE: 108 MMHG | BODY MASS INDEX: 29.45 KG/M2 | DIASTOLIC BLOOD PRESSURE: 68 MMHG | WEIGHT: 177 LBS

## 2023-05-18 DIAGNOSIS — R23.4 BREAST SKIN CHANGES: ICD-10-CM

## 2023-05-18 DIAGNOSIS — R63.5 WEIGHT GAIN: ICD-10-CM

## 2023-05-18 DIAGNOSIS — Z30.09 GENERAL COUNSELLING AND ADVICE ON CONTRACEPTION: ICD-10-CM

## 2023-05-18 DIAGNOSIS — Z01.419 WOMEN'S ANNUAL ROUTINE GYNECOLOGICAL EXAMINATION: Primary | ICD-10-CM

## 2023-05-18 RX ORDER — DIAPER,BRIEF,INFANT-TODD,DISP
EACH MISCELLANEOUS 2 TIMES DAILY
Qty: 28 G | Refills: 0 | Status: SHIPPED | OUTPATIENT
Start: 2023-05-18

## 2023-05-18 NOTE — TELEPHONE ENCOUNTER
Patient is scheduled for an IUD insertion and was given the codes for prices to verify coverage  Said she will call back to see if she is covered and what percentage  She is scheduled for an appointment 8/2/23 with Dr Eleazar Monreal in [place]

## 2023-08-02 ENCOUNTER — PROCEDURE VISIT (OUTPATIENT)
Dept: OBGYN CLINIC | Facility: CLINIC | Age: 28
End: 2023-08-02
Payer: COMMERCIAL

## 2023-08-02 VITALS
BODY MASS INDEX: 29.32 KG/M2 | HEIGHT: 65 IN | DIASTOLIC BLOOD PRESSURE: 74 MMHG | WEIGHT: 176 LBS | SYSTOLIC BLOOD PRESSURE: 120 MMHG

## 2023-08-02 DIAGNOSIS — Z30.430 ENCOUNTER FOR IUD INSERTION: Primary | ICD-10-CM

## 2023-08-02 LAB — SL AMB POCT URINE HCG: NEGATIVE

## 2023-08-02 PROCEDURE — 81025 URINE PREGNANCY TEST: CPT | Performed by: STUDENT IN AN ORGANIZED HEALTH CARE EDUCATION/TRAINING PROGRAM

## 2023-08-02 PROCEDURE — 58300 INSERT INTRAUTERINE DEVICE: CPT | Performed by: STUDENT IN AN ORGANIZED HEALTH CARE EDUCATION/TRAINING PROGRAM

## 2023-08-02 RX ORDER — DEXTROAMPHETAMINE SACCHARATE, AMPHETAMINE ASPARTATE, DEXTROAMPHETAMINE SULFATE AND AMPHETAMINE SULFATE 2.5; 2.5; 2.5; 2.5 MG/1; MG/1; MG/1; MG/1
TABLET ORAL
COMMUNITY
Start: 2023-06-28

## 2023-08-02 NOTE — PROGRESS NOTES
Last GC/CG 6/2022, does not want to repeat today  No sex in past two weeks  UPT negative today    Iud insertions    Date/Time: 8/2/2023 1:30 PM    Performed by: Kiana Laguerre MD  Authorized by: Kiana Laguerre MD    Other Assisting Provider: Yes (comment)    Verbal consent obtained?: Yes    Risks and benefits: Risks, benefits and alternatives were discussed    Consent given by:  Patient  Time Out:     Time out: Immediately prior to the procedure a time out was called    Patient states understanding of procedure being performed: Yes    Radiology Images displayed and confirmed. If images not available, report reviewed: Yes    Required items: Required blood products, implants, devices and special equipment available    Patient identity confirmed:  Verbally with patient and provided demographic data  Procedure:     Pelvic exam performed: yes      Negative GC/chlamydia test: declined. Negative urine pregnancy test: yes      Cervix cleaned and prepped: yes      Speculum placed in vagina: yes      Allis applied to cervix: yes      Uterus sounded: yes      Uterus sound depth (cm):  6    IUD inserted with no complications: yes      IUD type:  Mirena    Strings trimmed: yes    Post-procedure:     Patient tolerated procedure well: yes      Patient will follow up after next period: if any issues. Comments:      Patient interested in [address] IUD. Reviewed risk of pain, bleeding, infection, expulsion, malposition, uterine perforation requiring laparoscopic removal. All questions answered to the best of my ability.

## 2023-08-30 ENCOUNTER — TELEPHONE (OUTPATIENT)
Dept: OBGYN CLINIC | Facility: CLINIC | Age: 28
End: 2023-08-30

## 2023-08-30 DIAGNOSIS — Z30.431 IUD CHECK UP: Primary | ICD-10-CM

## 2023-08-30 NOTE — TELEPHONE ENCOUNTER
Patient called, left message on answering machine, IUD placed 8/2/23, last week started bleeding dark blood, stopped yesterday. Today bleeding restarted and she's bleeding through pads/tampons every 1.5-2 hours.

## 2023-08-30 NOTE — TELEPHONE ENCOUNTER
Spoke to patient, patient denies any dizziness, lightheadedness or sob. She is having mild cramping. Contacted on call provider who would like patient to go for an ultrasound. Orders placed. Patient aware to call central scheduling to complete. Patient will also call if her bleeding gets heavier, she feels dizzy, lightheaded or sob or if she has cramping not relieved with tylenol or ibuprofen.

## 2024-06-24 ENCOUNTER — ANNUAL EXAM (OUTPATIENT)
Dept: GYNECOLOGY | Facility: CLINIC | Age: 29
End: 2024-06-24
Payer: COMMERCIAL

## 2024-06-24 VITALS
SYSTOLIC BLOOD PRESSURE: 126 MMHG | BODY MASS INDEX: 25.59 KG/M2 | DIASTOLIC BLOOD PRESSURE: 78 MMHG | HEIGHT: 65 IN | WEIGHT: 153.6 LBS

## 2024-06-24 DIAGNOSIS — Z12.39 ENCOUNTER FOR SCREENING BREAST EXAMINATION: ICD-10-CM

## 2024-06-24 DIAGNOSIS — Z01.419 ENCOUNTER FOR WELL WOMAN EXAM: Primary | ICD-10-CM

## 2024-06-24 DIAGNOSIS — Z97.5 IUD (INTRAUTERINE DEVICE) IN PLACE: ICD-10-CM

## 2024-06-24 PROCEDURE — 99395 PREV VISIT EST AGE 18-39: CPT | Performed by: PHYSICIAN ASSISTANT

## 2024-06-24 NOTE — PROGRESS NOTES
Assessment/Plan:      Diagnoses and all orders for this visit:    Encounter for well woman exam    Encounter for screening breast examination    IUD (intrauterine device) in place          Subjective:     Patient ID: Edelmira Sevilla is a 28 y.o. female.    Pt presents for her annual exam today--  She has no complaints except recent fatigue and nausea  She has occ spotting, no pelvic pain--Mirena since 8/23  Bowel and bladder are regular  No breast concerns today  H/o ectopic    No pap today.    -upt  Rec daily mvi, b, d  See pcp form routine bw--h/o andrez        Review of Systems   Constitutional:  Negative for chills, fever and unexpected weight change.   HENT:  Negative for ear pain and sore throat.    Eyes:  Negative for pain and visual disturbance.   Respiratory:  Negative for cough and shortness of breath.    Cardiovascular:  Negative for chest pain and palpitations.   Gastrointestinal:  Negative for abdominal pain, blood in stool, constipation, diarrhea and vomiting.   Genitourinary: Negative.  Negative for dysuria and hematuria.   Musculoskeletal:  Negative for arthralgias and back pain.   Skin:  Negative for color change and rash.   Neurological:  Negative for seizures and syncope.   All other systems reviewed and are negative.        Objective:     Physical Exam  Vitals and nursing note reviewed.   Constitutional:       Appearance: Normal appearance. She is well-developed.   HENT:      Head: Normocephalic and atraumatic.   Chest:   Breasts:     Right: No inverted nipple, mass, nipple discharge or skin change.      Left: No inverted nipple, mass, nipple discharge or skin change.   Abdominal:      Palpations: Abdomen is soft.   Genitourinary:     General: Normal vulva.      Exam position: Supine.      Labia:         Right: No rash, tenderness or lesion.         Left: No rash, tenderness or lesion.       Vagina: Normal.      Cervix: No cervical motion tenderness, discharge or friability.      Adnexa:         Right:  No mass, tenderness or fullness.          Left: No mass, tenderness or fullness.     Musculoskeletal:      Cervical back: Normal range of motion.   Lymphadenopathy:      Lower Body: No right inguinal adenopathy. No left inguinal adenopathy.   Neurological:      Mental Status: She is alert.

## 2024-08-17 NOTE — PROGRESS NOTES
RENAL PROGRESS NOTE      ASSESSMENT:  ESRD:  IHD MWF at the [facility]  RX:  , Typically high volume UF 3-5kg per pt report.  (he frequently cuts tx short) /, K 2   ACCESS: L AVF, bruit present on exam  S/P fistulagram 8/13  H/o poor compliance, short tx etc   Recurrent issues with HyperK  Continue Monday Wednesday Friday dialysis schedule per outpatient       HyperKalemia:  He will dialyze after hyperkalemia episode.  Renal diet       HTN/volume:  Mostly controlled when he allows it to be taken (complaint of severe pain with BP cuff) and compliant with meds   Typically quite volume overloaded , high volume UF , educated on FR   PTA Amlod, Metop, continue    High BP today, declined his AM meds today     CKD/MBD:  On calcitriol, Phoslo as binder, continue  Pt has been refusing phos binder  Updating phos and if sustained high change to renal diet      Anemia:  ACD, on DEMAR and venofer as needed outpt,  Last hgb in the 8's     Bi-polar:  On Risperdal, h/o oppositional behavior tendencies, poor medical compliance , some paranoid behaviour noted in the ED during my intake   Reported cognitive impairment, with recurrent conflicts with prior staff, transport team, working on psych TCU placement and housing per       Homelessness:  Pt reports that he doesn't not have stable living  situation, and gets to/from dialysis via scooter   Keeps clothing in a storage locker     Chest pain:  Recurrent, prior ACS w/up reportedly unremarkable.    Facial Swelling  Ultrasound, Hospital working up  Now on Abx  Pt is ESRD and is on HD. No concerns for CT contrast study used. Ideal if can dialyze afterwards.   Swelling appears improved    SUBJECTIVE:    Patient seen and examined at bedside.  Denies any chest pain, palpitation, nausea.      OBJECTIVE:  Physical Exam   Temp: 97.6  F (36.4  C) Temp src: Temporal BP: (!) 153/110 Pulse: 87   Resp: 16 SpO2: (!) 87 % O2 Device: None (Room air)    Vitals:     Pt's boyfriend Christina Bojorquez at bedside  24 1950 24   Weight: 104.3 kg (229 lb 15 oz) 112.7 kg (248 lb 7.3 oz)     Vital Signs with Ranges  Temp:  [97.4  F (36.3  C)-97.6  F (36.4  C)] 97.6  F (36.4  C)  Pulse:  [] 87  Resp:  [14-42] 16  BP: ()/() 153/110  SpO2:  [87 %-100 %] 87 %  I/O last 3 completed shifts:  In: 200 [Other:200]  Out: 3500 [Other:3500]    Temp (24hrs), Av.4  F (36.9  C), Min:98.4  F (36.9  C), Max:98.4  F (36.9  C)      No data found.    Intake/Output Summary (Last 24 hours) at 2024 1408  Last data filed at 2024 2150  Gross per 24 hour   Intake --   Output 200 ml   Net -200 ml       PHYSICAL EXAM:  General - Alert and oriented x3  Right cheek swelling present, although improved compared to yesterday  Cardiovascular - BP escalated, pt did not take AM meds   Respiratory -on RA   Abd: obese   Extremities -no edema on LL, tender to palpation   Skin: dry, intact, no rash, good turgor  Neuro:  Grossly intact, no focal deficits  MSK:  Grossly intact  Psych:  Normal affect, has been argumentative here with some staff at hospital    LABORATORY STUDIES:     Recent Labs   Lab 24  0705   WBC 7.9   RBC 2.90*   HGB 8.0*   HCT 25.7*          Basic Metabolic Panel:  Recent Labs   Lab 24  1204 24  1553 24  0705     --  137   POTASSIUM 6.0* 4.4 6.0*   CHLORIDE 97*  --  98   CO2 23  --  23   BUN 84.9*  --   --    CR 15.98*  --   --    *  --   --    LOVELY 8.7*  --   --        INRNo lab results found in last 7 days.     Recent Labs   Lab Test 24  0705 08/10/24  1138   WBC 7.9 5.8   HGB 8.0* 9.4*    274       Personally reviewed current labs      Gerardo Mancilla MD   [facility]  [phone]

## 2024-08-19 ENCOUNTER — NURSE TRIAGE (OUTPATIENT)
Age: 29
End: 2024-08-19

## 2024-08-19 ENCOUNTER — OFFICE VISIT (OUTPATIENT)
Dept: OBGYN CLINIC | Facility: CLINIC | Age: 29
End: 2024-08-19
Payer: COMMERCIAL

## 2024-08-19 ENCOUNTER — APPOINTMENT (OUTPATIENT)
Dept: LAB | Facility: CLINIC | Age: 29
End: 2024-08-19
Payer: COMMERCIAL

## 2024-08-19 VITALS — DIASTOLIC BLOOD PRESSURE: 76 MMHG | BODY MASS INDEX: 24.06 KG/M2 | SYSTOLIC BLOOD PRESSURE: 124 MMHG | WEIGHT: 144.6 LBS

## 2024-08-19 DIAGNOSIS — Z12.4 CERVICAL CANCER SCREENING: Primary | ICD-10-CM

## 2024-08-19 DIAGNOSIS — Z11.3 ROUTINE SCREENING FOR STI (SEXUALLY TRANSMITTED INFECTION): ICD-10-CM

## 2024-08-19 LAB
HAV IGM SER QL: NORMAL
HBV CORE IGM SER QL: NORMAL
HBV SURFACE AG SER QL: NORMAL
HCV AB SER QL: NORMAL
TREPONEMA PALLIDUM IGG+IGM AB [PRESENCE] IN SERUM OR PLASMA BY IMMUNOASSAY: NORMAL

## 2024-08-19 PROCEDURE — 36415 COLL VENOUS BLD VENIPUNCTURE: CPT

## 2024-08-19 PROCEDURE — 86695 HERPES SIMPLEX TYPE 1 TEST: CPT | Performed by: OBSTETRICS & GYNECOLOGY

## 2024-08-19 PROCEDURE — 99213 OFFICE O/P EST LOW 20 MIN: CPT | Performed by: OBSTETRICS & GYNECOLOGY

## 2024-08-19 PROCEDURE — 86696 HERPES SIMPLEX TYPE 2 TEST: CPT | Performed by: OBSTETRICS & GYNECOLOGY

## 2024-08-19 PROCEDURE — 87389 HIV-1 AG W/HIV-1&-2 AB AG IA: CPT

## 2024-08-19 PROCEDURE — 80074 ACUTE HEPATITIS PANEL: CPT | Performed by: OBSTETRICS & GYNECOLOGY

## 2024-08-19 PROCEDURE — 86780 TREPONEMA PALLIDUM: CPT

## 2024-08-19 RX ORDER — BUSPIRONE HYDROCHLORIDE 5 MG/1
5 TABLET ORAL 2 TIMES DAILY
COMMUNITY
Start: 2024-07-30

## 2024-08-19 NOTE — TELEPHONE ENCOUNTER
Regarding: vaginal burning and itching with dryness  ----- Message from Shanthi HALLMAN sent at 8/19/2024  9:45 AM EDT -----  Patient has been cheated on by her partner and has vaginal burning and itching and dryness. She has this since last week. Please advise.

## 2024-08-19 NOTE — PROGRESS NOTES
This is a 29-year-old white female, she is a  4 para 1 with 1 vaginal delivery approximately 3 months ago.  Her current method of contraception includes a 5-year IUD which has been in placed over a year.  Unfortunately she found out this weekend that her current partner has been unfaithful she is very upset she is very emotional she is not requesting STD screening.  She denies any problem with dysuria or abnormal vaginal discharge itching or burning.  She denies any fever or chills.    Examination of the external genitalia with normal limits, the vagina is not suspicious for infection.  IUD string was seen.  A Pap smear was done which will screen for GC and chlamydia.  We also did a molecular panel.  Serology orders have been written.  She will be informed the results of the cultures when they return.  She feels safe at home.  There are no weapons involved she is not afraid of him.

## 2024-08-19 NOTE — TELEPHONE ENCOUNTER
"Outgoing call to patient. Patient states she recently found out that she has been cheated on - is tearful on phone. Has been having symptoms for the past few days - vaginal burning, itching and dryness. Denies odor and discharge changes. Patient requesting appointment as soon as possible to be seen. Appointment scheduled for evaluation today, 8/19.    Reason for Disposition   Patient wants to be seen    Answer Assessment - Initial Assessment Questions  1. SYMPTOM: \"What's the main symptom you're concerned about?\" (e.g., pain, itching, dryness)      Vaginal burning and itching. Vaginal dryness. Patient was recently cheated on. Denies odor.     3. ONSET: \"When did the  symptoms  start?\"      Comes and goes, worse this past week.     4. PAIN: \"Is there any pain?\" If Yes, ask: \"How bad is it?\" (Scale: 1-10; mild, moderate, severe)      denies    5. ITCHING: \"Is there any itching?\" If Yes, ask: \"How bad is it?\" (Scale: 1-10; mild, moderate, severe)      Yes, itching     6. CAUSE: \"What do you think is causing the discharge?\" \"Have you had the same problem before? What happened then?\"      unknown    7. OTHER SYMPTOMS: \"Do you have any other symptoms?\" (e.g., fever, itching, vaginal bleeding, pain with urination, injury to genital area, vaginal foreign body)      denies    8. PREGNANCY: \"Is there any chance you are pregnant?\" \"When was your last menstrual period?\"      LMP - unknown has IUD    Protocols used: Vaginal Symptoms-ADULT-[place]    "

## 2024-08-20 LAB
HIV 1+2 AB+HIV1 P24 AG SERPL QL IA: NORMAL
HIV 2 AB SERPL QL IA: NORMAL
HIV1 AB SERPL QL IA: NORMAL
HIV1 P24 AG SERPL QL IA: NORMAL
HSV2 IGG SERPL QL IA: NEGATIVE
HSV2 IGG SERPL QL IA: POSITIVE

## 2024-08-21 LAB
C TRACH RRNA SPEC QL NAA+PROBE: NOT DETECTED
CLINICAL INFO: NORMAL
CYTO CVX: NORMAL
CYTOLOGY CMNT CVX/VAG CYTO-IMP: NORMAL
DATE PREVIOUS BX: NORMAL
LMP START DATE: NORMAL
N GONORRHOEA RRNA SPEC QL NAA+PROBE: NOT DETECTED
SL AMB PREV. PAP:: NORMAL
SPECIMEN SOURCE CVX/VAG CYTO: NORMAL

## 2024-09-25 ENCOUNTER — NURSE TRIAGE (OUTPATIENT)
Age: 29
End: 2024-09-25

## 2024-09-25 ENCOUNTER — OFFICE VISIT (OUTPATIENT)
Dept: OBGYN CLINIC | Facility: CLINIC | Age: 29
End: 2024-09-25
Payer: COMMERCIAL

## 2024-09-25 VITALS — SYSTOLIC BLOOD PRESSURE: 118 MMHG | BODY MASS INDEX: 24.13 KG/M2 | DIASTOLIC BLOOD PRESSURE: 70 MMHG | WEIGHT: 145 LBS

## 2024-09-25 DIAGNOSIS — N89.8 VAGINAL DISCHARGE: Primary | ICD-10-CM

## 2024-09-25 PROCEDURE — 99213 OFFICE O/P EST LOW 20 MIN: CPT | Performed by: OBSTETRICS & GYNECOLOGY

## 2024-09-25 NOTE — PATIENT INSTRUCTIONS
Patient was informed that the vagina was cleaned it was full of Monistat cream.  We did do a molecular panel.  If this shows nothing we may consider being treating her for bacterial vaginosis with metronidazole.  I will inform her when I see the results.

## 2024-09-25 NOTE — PROGRESS NOTES
Is a 29-year-old white female, now seen for vaginal irritation.  She thought she had a yeast infection.  She is use alysha stat over-the-counter.  She feels some relief.    Examination failed to show any obvious evidence of infection.  Molecular panel was done.  We have decided to hold off any therapy until we see the results of the molecular panel.  She may have a BV that is hidden from all the cream in the vagina.  She will be informed the results when I get them back.

## 2024-09-25 NOTE — TELEPHONE ENCOUNTER
"5 days ago started with vaginal irritation up inside her vagina, \" my cervix is irritated\" Patient reports she had shower sex 2 days before symptoms started. Patient reports she is uncomfortable 7/10 with slight pressure feeling that is constant.Denies fever or any UTI symptoms. Patient tried Monistat for 3 days without relief. Patient unsure what is causing this discomfort but would like eval as soon as possible. Appointment scheduled for 1 pm.    Reason for Disposition   MILD-MODERATE pain and present > 24 hours (Exception: chronic pain)    Answer Assessment - Initial Assessment Questions  1. SYMPTOM: \"What's the main symptom you're concerned about?\" (e.g., pain, itching, dryness)      Vaginal irritation   2. LOCATION: \"Where is the symptoms located?\" (e.g., inside/outside, left/right)      Up inside   3. ONSET: \"When did the  symptoms  start?\"      5 days ago  4. PAIN: \"Is there any pain?\" If Yes, ask: \"How bad is it?\" (Scale: 1-10; mild, moderate, severe)      Discomfort 7/10  5. ITCHING: \"Is there any itching?\" If Yes, ask: \"How bad is it?\" (Scale: 1-10; mild, moderate, severe)      denies  6. CAUSE: \"What do you think is causing the discharge?\" \"Have you had the same problem before? What happened then?\"      No discharge  7. OTHER SYMPTOMS: \"Do you have any other symptoms?\" (e.g., fever, itching, vaginal bleeding, pain with urination, injury to genital area, vaginal foreign body)      denies  8. PREGNANCY: \"Is there any chance you are pregnant?\" \"When was your last menstrual period?\"      IUD    Protocols used: Vaginal Symptoms-ADULT-[place]    "

## 2024-09-27 LAB
BV BACTERIA RRNA VAG QL NAA+PROBE: NEGATIVE
C GLABRATA RNA VAG QL NAA+PROBE: NOT DETECTED
CANDIDA RRNA VAG QL PROBE: NOT DETECTED
T VAGINALIS RRNA SPEC QL NAA+PROBE: NOT DETECTED

## 2025-01-09 ENCOUNTER — PROCEDURE VISIT (OUTPATIENT)
Dept: OBGYN CLINIC | Facility: CLINIC | Age: 30
End: 2025-01-09
Payer: COMMERCIAL

## 2025-01-09 VITALS — SYSTOLIC BLOOD PRESSURE: 130 MMHG | BODY MASS INDEX: 25.29 KG/M2 | DIASTOLIC BLOOD PRESSURE: 82 MMHG | WEIGHT: 152 LBS

## 2025-01-09 DIAGNOSIS — Z30.432 ENCOUNTER FOR IUD REMOVAL: Primary | ICD-10-CM

## 2025-01-09 PROCEDURE — 58301 REMOVE INTRAUTERINE DEVICE: CPT | Performed by: OBSTETRICS & GYNECOLOGY

## 2025-01-09 NOTE — PROGRESS NOTES
This is a 29-year-old female she is a  4 para 1.  Her daughter is now 2 years of age.  She is not in a sexual relationship.  She has decided she wants to be, ceric it.  She needs to have her IUD removed.  We reviewed the removing her IUD for that reason.      IUD Procedure    Date/Time: 2025 8:22 AM    Performed by: Naldo Magallanes MD  Authorized by: Naldo Magallanes MD    Other Assisting Provider: No    Verbal consent obtained?: Yes    Time Out:     Time out performed at:  2025 8:22 AM  Patient states understanding of procedure being performed: Yes    Patient's understanding of procedure matches consent: Yes    Procedure consent matches procedure scheduled: Yes    Relevant documents present and verified: Yes    Site marked: No    Radiology Images displayed and confirmed. If images not available, report reviewed: No    Patient identity confirmed:  Verbally with patient  Select procedure: IUD removal    IUD Removal:     Reason for removal: patient request      Removed without complications: yes      Tenaculum/Allis/Ring Forceps applied to cervix: yes      Strings visualized: yes      IUD intact: yes      Performed with ultrasound guidance: no    Removal Comments:      The IUD was removed for difficulty shown to the patient prior to disposal.  There were no complications or incidents.  She will keep us informed of her surrogacy plans.

## 2025-01-09 NOTE — PATIENT INSTRUCTIONS
The patient tolerated procedure well.  The IUD was removed and answered all complications it was intact.  The patient now knows her fertility has been returned.  She will follow with her IVF team for surrogacy.  She will keep us informed.

## 2025-05-24 ENCOUNTER — HOSPITAL ENCOUNTER (EMERGENCY)
Facility: HOSPITAL | Age: 30
Discharge: HOME | End: 2025-05-25
Payer: COMMERCIAL

## 2025-05-24 ENCOUNTER — APPOINTMENT (EMERGENCY)
Dept: ULTRASOUND IMAGING | Facility: HOSPITAL | Age: 30
End: 2025-05-24
Payer: COMMERCIAL

## 2025-05-24 DIAGNOSIS — O20.9 VAGINAL BLEEDING IN PREGNANCY, FIRST TRIMESTER: Primary | ICD-10-CM

## 2025-05-24 LAB
ALBUMIN SERPL BCG-MCNC: 4.4 G/DL (ref 3.5–5)
ALP SERPL-CCNC: 43 U/L (ref 34–104)
ALT SERPL W P-5'-P-CCNC: 5 U/L (ref 7–52)
ANION GAP SERPL CALCULATED.3IONS-SCNC: 6 MMOL/L (ref 4–13)
AST SERPL W P-5'-P-CCNC: 12 U/L (ref 13–39)
B-HCG SERPL-ACNC: 14.1 MIU/ML (ref 0–5)
BASOPHILS # BLD AUTO: 0.05 THOUSANDS/ÂΜL (ref 0–0.1)
BASOPHILS NFR BLD AUTO: 1 % (ref 0–1)
BILIRUB SERPL-MCNC: 0.32 MG/DL (ref 0.2–1)
BUN SERPL-MCNC: 11 MG/DL (ref 5–25)
CALCIUM SERPL-MCNC: 8.8 MG/DL (ref 8.4–10.2)
CHLORIDE SERPL-SCNC: 106 MMOL/L (ref 96–108)
CO2 SERPL-SCNC: 26 MMOL/L (ref 21–32)
CREAT SERPL-MCNC: 0.91 MG/DL (ref 0.6–1.3)
EOSINOPHIL # BLD AUTO: 0.13 THOUSAND/ÂΜL (ref 0–0.61)
EOSINOPHIL NFR BLD AUTO: 2 % (ref 0–6)
ERYTHROCYTE [DISTWIDTH] IN BLOOD BY AUTOMATED COUNT: 11.3 % (ref 11.6–15.1)
GFR SERPL CREATININE-BSD FRML MDRD: 85 ML/MIN/1.73SQ M
GLUCOSE SERPL-MCNC: 61 MG/DL (ref 65–140)
HCT VFR BLD AUTO: 39.9 % (ref 34.8–46.1)
HGB BLD-MCNC: 13.5 G/DL (ref 11.5–15.4)
IMM GRANULOCYTES # BLD AUTO: 0.02 THOUSAND/UL (ref 0–0.2)
IMM GRANULOCYTES NFR BLD AUTO: 0 % (ref 0–2)
LIPASE SERPL-CCNC: 18 U/L (ref 11–82)
LYMPHOCYTES # BLD AUTO: 2.79 THOUSANDS/ÂΜL (ref 0.6–4.47)
LYMPHOCYTES NFR BLD AUTO: 38 % (ref 14–44)
MCH RBC QN AUTO: 30.8 PG (ref 26.8–34.3)
MCHC RBC AUTO-ENTMCNC: 33.8 G/DL (ref 31.4–37.4)
MCV RBC AUTO: 91 FL (ref 82–98)
MONOCYTES # BLD AUTO: 0.57 THOUSAND/ÂΜL (ref 0.17–1.22)
MONOCYTES NFR BLD AUTO: 8 % (ref 4–12)
NEUTROPHILS # BLD AUTO: 3.85 THOUSANDS/ÂΜL (ref 1.85–7.62)
NEUTS SEG NFR BLD AUTO: 51 % (ref 43–75)
NRBC BLD AUTO-RTO: 0 /100 WBCS
PLATELET # BLD AUTO: 319 THOUSANDS/UL (ref 149–390)
PMV BLD AUTO: 9 FL (ref 8.9–12.7)
POTASSIUM SERPL-SCNC: 3.9 MMOL/L (ref 3.5–5.3)
PROT SERPL-MCNC: 6.9 G/DL (ref 6.4–8.4)
RBC # BLD AUTO: 4.39 MILLION/UL (ref 3.81–5.12)
SODIUM SERPL-SCNC: 138 MMOL/L (ref 135–147)
WBC # BLD AUTO: 7.41 THOUSAND/UL (ref 4.31–10.16)

## 2025-05-24 PROCEDURE — 36415 COLL VENOUS BLD VENIPUNCTURE: CPT

## 2025-05-24 PROCEDURE — 84702 CHORIONIC GONADOTROPIN TEST: CPT

## 2025-05-24 PROCEDURE — 99284 EMERGENCY DEPT VISIT MOD MDM: CPT

## 2025-05-24 PROCEDURE — 76815 OB US LIMITED FETUS(S): CPT

## 2025-05-24 PROCEDURE — 85025 COMPLETE CBC W/AUTO DIFF WBC: CPT

## 2025-05-24 PROCEDURE — 83690 ASSAY OF LIPASE: CPT

## 2025-05-24 PROCEDURE — 80053 COMPREHEN METABOLIC PANEL: CPT

## 2025-05-25 VITALS
DIASTOLIC BLOOD PRESSURE: 68 MMHG | TEMPERATURE: 98.4 F | SYSTOLIC BLOOD PRESSURE: 120 MMHG | HEART RATE: 85 BPM | RESPIRATION RATE: 18 BRPM | OXYGEN SATURATION: 99 %

## 2025-05-25 PROCEDURE — 99284 EMERGENCY DEPT VISIT MOD MDM: CPT

## 2025-05-25 NOTE — DISCHARGE INSTRUCTIONS
Get quantitative hCG blood test in 2-3 days.  Follow up with OBGYN.  Tylenol 650 mg every 6 hours or Tylenol 1,000 mg every 8 hours for pain.  Return to ER with worsening abdominal pain, increased vaginal bleeding, or any other concerning symptoms.

## 2025-05-25 NOTE — ED PROVIDER NOTES
"Time reflects when diagnosis was documented in both MDM as applicable and the Disposition within this note       Time User Action Codes Description Comment    2025  1:35 AM Abebe Shine Add [O20.9] Vaginal bleeding in pregnancy, first trimester           ED Disposition       ED Disposition   Discharge    Condition   Stable    Date/Time   Sun May 25, 2025  1:34 AM    Comment   Edelmira Sevilla discharge to home/self care.                   Assessment & Plan       Medical Decision Making  Pleasant  female presenting with light vaginal bleeding starting today. Patient also notes mild bilateral abdominal cramping. Patient states her last menstrual cycle was on 2025 and has had multiple positive urine pregnancy tests at home. She has not had a quantitative hcg or a formal US for this pregnancy yet. She has been eating and drinking well at home. Denies changes in bowel habits. She denies urinary symptoms. She denies fevers, chills, URI symptoms, chest pain, palpitations, SOB, cough, upper abdominal pain, NVD, paresthesias, or syncope.     DDX including but not limited to: early intrauterine gestation, spontaneous , ectopic pregnancy, ovarian cyst, ovarian torsion, atypical appendicitis, pancreatitis,unknown trauma    CBC, CMP, Lipase unremarkable. Quantitative bHCG 14.1. Type and Screen from  shows patient is O positive. US OB <14 weeks shows \"No intrauterine gestation is seen. Differential considerations include early intrauterine gestation, spontaneous  or ectopic pregnancy. Clinical correlation and correlation with serial quantitative bHCG measurements recommended. Normal appearance of the bilateral ovaries.\" Bloodwork reassuring, abdominal exam benign, patient resting comfortably, doubt any acute abdominal surgical etiology at this time. Will order patient serial bHCG quantitative blood tests, advised patient to complete blood test in next 48-72 hours. Advised patient to follow up with " OBGYN. Patient comfortable, vitals stable, patient safe and stable for discharge. Prior to discharge, discharge instructions were discussed with patient at bedside. Patient was provided both verbal and written instructions. Patient is understanding of the discharge instructions and is agreeable to plan of care. Return precautions were discussed with patient bedside, patient verbalized understanding of signs and symptoms that would necessitate return to the ED. All questions were answered. Patient was comfortable with the plan of care and discharged to home.             Problems Addressed:  Vaginal bleeding in pregnancy, first trimester: acute illness or injury    Amount and/or Complexity of Data Reviewed  Labs: ordered.  Radiology: ordered.             Medications - No data to display    ED Risk Strat Scores                    No data recorded        SBIRT 20yo+      Flowsheet Row Most Recent Value   Initial Alcohol Screen: US AUDIT-C     1. How often do you have a drink containing alcohol? 0 Filed at: 05/24/2025 2140   2. How many drinks containing alcohol do you have on a typical day you are drinking?  0 Filed at: 05/24/2025 2140   3a. Male UNDER 65: How often do you have five or more drinks on one occasion? 0 Filed at: 05/24/2025 2140   3b. FEMALE Any Age, or MALE 65+: How often do you have 4 or more drinks on one occassion? 0 Filed at: 05/24/2025 2140   Audit-C Score 0 Filed at: 05/24/2025 2140   BRYANT: How many times in the past year have you...    Used an illegal drug or used a prescription medication for non-medical reasons? Never Filed at: 05/24/2025 2140                            History of Present Illness       Chief Complaint   Patient presents with    Vaginal Bleeding     Patient comes in reporting light vaginal bleeding which began right before coming to ED. Reports abd cramping. Denies passing clots. Approx 6 weeks pregnant.        Past Medical History[1]   Past Surgical History[2]   Family History[3]    Social History[4]   E-Cigarette/Vaping    E-Cigarette Use Never User       E-Cigarette/Vaping Substances    Nicotine No     THC No     CBD No     Flavoring No     Other No       I have reviewed and agree with the history as documented.     Lona  female presenting with light vaginal bleeding starting today. Patient also notes mild bilateral abdominal cramping. Patient states her last menstrual cycle was on 2025 and has had multiple positive urine pregnancy tests at home. She has not had a quantitative hcg or a formal US for this pregnancy yet. She has been eating and drinking well at home. Denies changes in bowel habits. She denies urinary symptoms. She denies fevers, chills, URI symptoms, chest pain, palpitations, SOB, cough, upper abdominal pain, NVD, paresthesias, or syncope.       Vaginal Bleeding  Associated symptoms: no abdominal pain, no back pain, no dizziness, no dysuria, no fever, no nausea and no vaginal discharge        Review of Systems   Constitutional:  Negative for chills and fever.   HENT:  Negative for congestion, ear pain and sore throat.    Eyes:  Negative for visual disturbance.   Respiratory:  Negative for cough and shortness of breath.    Cardiovascular:  Negative for chest pain and palpitations.   Gastrointestinal:  Negative for abdominal pain, diarrhea, nausea and vomiting.   Genitourinary:  Positive for vaginal bleeding (light). Negative for dysuria, hematuria, vaginal discharge and vaginal pain.   Musculoskeletal:  Negative for back pain and neck pain.   Skin:  Negative for color change and rash.   Neurological:  Negative for dizziness, syncope, weakness and headaches.   Psychiatric/Behavioral: Negative.     All other systems reviewed and are negative.          Objective       ED Triage Vitals [25 2141]   Temperature Pulse Blood Pressure Respirations SpO2 Patient Position - Orthostatic VS   98.4 °F (36.9 °C) 77 121/77 18 99 % Sitting      Temp Source Heart Rate Source  BP Location FiO2 (%) Pain Score    Oral Monitor Right arm -- --      Vitals      Date and Time Temp Pulse SpO2 Resp BP Pain Score FACES Pain Rating User   05/25/25 0137 -- 85 99 % 18 120/68 -- -- KD   05/24/25 2345 -- 82 99 % 18 124/76 -- --    05/24/25 2141 98.4 °F (36.9 °C) 77 99 % 18 121/77 -- --             Physical Exam  Vitals reviewed.   Constitutional:       General: She is not in acute distress.     Appearance: Normal appearance. She is normal weight. She is not ill-appearing or toxic-appearing.   HENT:      Head: Normocephalic and atraumatic.      Nose: Nose normal.      Mouth/Throat:      Mouth: Mucous membranes are moist.      Pharynx: Oropharynx is clear.     Eyes:      Conjunctiva/sclera: Conjunctivae normal.       Cardiovascular:      Rate and Rhythm: Normal rate and regular rhythm.      Pulses: Normal pulses.      Heart sounds: Normal heart sounds.   Pulmonary:      Effort: Pulmonary effort is normal.      Breath sounds: Normal breath sounds.   Abdominal:      General: Abdomen is flat. Bowel sounds are normal. There is no distension.      Palpations: Abdomen is soft.      Tenderness: There is no abdominal tenderness. There is no guarding or rebound.     Musculoskeletal:         General: Normal range of motion.      Cervical back: Normal range of motion.     Skin:     General: Skin is warm and dry.      Capillary Refill: Capillary refill takes less than 2 seconds.     Neurological:      General: No focal deficit present.      Mental Status: She is alert and oriented to person, place, and time. Mental status is at baseline.     Psychiatric:         Mood and Affect: Mood normal.         Behavior: Behavior normal.         Thought Content: Thought content normal.         Judgment: Judgment normal.         Results Reviewed       Procedure Component Value Units Date/Time    hCG, quantitative, pregnancy [[id number]]  (Abnormal) Collected: 05/24/25 2155    Lab Status: Final result Specimen: Blood Updated:  05/24/25 2223     HCG, Quant 14.1 mIU/mL     Narrative:       Expected Ranges:    HCG results between 5.0 and 25.0 mIU/mL may be indicative of early pregnancy but should be interpreted in light of the total clinical presentation.    HCG can rise to detectable levels in teofilo and post menopausal women (0-11.6 mIU/mL).     Approximate               Approximate HCG  Gestation age          Concentration ( mIU/mL)  _____________          ______________________   Weeks                      HCG values  0.2-1                       5-50  1-2                           2-3                         100-5000  3-4                         500-84439  4-5                         1000-86322  5-6                         [id number]  6-8                         32438-220984  8-12                        [id number]      Comprehensive metabolic panel [[id number]]  (Abnormal) Collected: 05/24/25 2155    Lab Status: Final result Specimen: Blood Updated: 05/24/25 2214     Sodium 138 mmol/L      Potassium 3.9 mmol/L      Chloride 106 mmol/L      CO2 26 mmol/L      ANION GAP 6 mmol/L      BUN 11 mg/dL      Creatinine 0.91 mg/dL      Glucose 61 mg/dL      Calcium 8.8 mg/dL      AST 12 U/L      ALT 5 U/L      Alkaline Phosphatase 43 U/L      Total Protein 6.9 g/dL      Albumin 4.4 g/dL      Total Bilirubin 0.32 mg/dL      eGFR 85 ml/min/1.73sq m     Narrative:      National Kid[employer] guidelines for Chronic Kidney Disease (CKD):     Stage 1 with normal or high GFR (GFR > 90 mL/min/1.73 square meters)    Stage 2 Mild CKD (GFR = 60-89 mL/min/1.73 square meters)    Stage 3A Moderate CKD (GFR = 45-59 mL/min/1.73 square meters)    Stage 3B Moderate CKD (GFR = 30-44 mL/min/1.73 square meters)    Stage 4 Severe CKD (GFR = 15-29 mL/min/1.73 square meters)    Stage 5 End Stage CKD (GFR <15 mL/min/1.73 square meters)  Note: GFR calculation is accurate only with a steady state creatinine    Lipase [[id number]]  (Normal) Collected:  25    Lab Status: Final result Specimen: Blood Updated: 25     Lipase 18 u/L     CBC and differential [[id number]]  (Abnormal) Collected: 25    Lab Status: Final result Specimen: Blood Updated: 25     WBC 7.41 Thousand/uL      RBC 4.39 Million/uL      Hemoglobin 13.5 g/dL      Hematocrit 39.9 %      MCV 91 fL      MCH 30.8 pg      MCHC 33.8 g/dL      RDW 11.3 %      MPV 9.0 fL      Platelets 319 Thousands/uL      nRBC 0 /100 WBCs      Segmented % 51 %      Immature Grans % 0 %      Lymphocytes % 38 %      Monocytes % 8 %      Eosinophils Relative 2 %      Basophils Relative 1 %      Absolute Neutrophils 3.85 Thousands/µL      Absolute Immature Grans 0.02 Thousand/uL      Absolute Lymphocytes 2.79 Thousands/µL      Absolute Monocytes 0.57 Thousand/µL      Eosinophils Absolute 0.13 Thousand/µL      Basophils Absolute 0.05 Thousands/µL             US OB pregnancy limited with transvaginal   Final Interpretation by Christos Aragon DO ([id number])      No intrauterine gestation is seen.      Differential considerations include early intrauterine gestation, spontaneous  or ectopic pregnancy. Clinical correlation and correlation with serial quantitative bHCG measurements recommended.      Normal appearance of the bilateral ovaries. Other findings as above.            Workstation performed: [id number]             Procedures    ED Medication and Procedure Management   Prior to Admission Medications   Prescriptions Last Dose Informant Patient Reported? Taking?   Multiple Vitamin (MULTI VITAMIN DAILY PO)  Self Yes No   Sig: Take by mouth   Patient not taking: Reported on 2024   amphetamine-dextroamphetamine (ADDERALL) 10 mg tablet   Yes No   Sig: TAKE ONE TABLET BY MOUTH TWO TIMES A DAY. MAX DAILY AMOUNT: 20 MG   busPIRone (BUSPAR) 5 mg tablet   Yes No   Sig: Take 5 mg by mouth 2 (two) times a day   famotidine (PEPCID) 40 MG tablet   No No   Sig: Take 1 tablet  (40 mg total) by mouth daily at bedtime   hydrocortisone 1 % ointment   No No   Sig: Apply topically 2 (two) times a day   Patient not taking: Reported on 1/9/2025   omeprazole (PriLOSEC) 40 MG capsule   No No   Sig: Take 1 capsule (40 mg total) by mouth daily Take 30 minutes before breakfast.      Facility-Administered Medications: None     Discharge Medication List as of 5/25/2025  1:38 AM        CONTINUE these medications which have NOT CHANGED    Details   amphetamine-dextroamphetamine (ADDERALL) 10 mg tablet TAKE ONE TABLET BY MOUTH TWO TIMES A DAY. MAX DAILY AMOUNT: 20 MG, Historical Med      busPIRone (BUSPAR) 5 mg tablet Take 5 mg by mouth 2 (two) times a day, Starting Tue 7/30/2024, Historical Med      famotidine (PEPCID) 40 MG tablet Take 1 tablet (40 mg total) by mouth daily at bedtime, Starting Tue 10/8/2024, Normal      hydrocortisone 1 % ointment Apply topically 2 (two) times a day, Starting Thu 5/18/2023, Normal      Multiple Vitamin (MULTI VITAMIN DAILY PO) Take by mouth, Historical Med      omeprazole (PriLOSEC) 40 MG capsule Take 1 capsule (40 mg total) by mouth daily Take 30 minutes before breakfast., Starting Tue 10/8/2024, Normal           Outpatient Discharge Orders   hCG, quantitative   Standing Status: Future Standing Exp. Date: 07/25/26     ED SEPSIS DOCUMENTATION   Time reflects when diagnosis was documented in both MDM as applicable and the Disposition within this note       Time User Action Codes Description Comment    5/25/2025  1:35 AM Abebe Shine [O20.9] Vaginal bleeding in pregnancy, first trimester                    [1]   Past Medical History:  Diagnosis Date    Acne     Anxiety     Carpal tunnel syndrome     Closed nondisplaced fracture of middle phalanx of right ring finger 12/8/2018    Concussion with loss of consciousness 2/16/2022   [2]   Past Surgical History:  Procedure Laterality Date    COLONOSCOPY      EXAMINATION UNDER ANESTHESIA N/A 11/6/2018    Procedure: EXAM  UNDER ANESTHESIA (EUA)  WITH IUD REMOVAL;  Surgeon: Gretchen Gonzalez DO;  Location:  MAIN OR;  Service: Gynecology    NE OPEN TX PHALANGEAL SHAFT FRACTURE PROX/MIDDLE EA Right 12/19/2018    Procedure: CLOSED REDUCTION PERCUTANEOUS PINNING P2 RIGHT RING FINGER;  Surgeon: Thaddeus Carey DO;  Location: [facility];  Service: Orthopedics    REMOVAL OF INTRAUTERINE DEVICE (IUD) N/A 11/6/2018    Procedure: REMOVAL OF INTRAUTERINE DEVICE (IUD);  Surgeon: Gretchen Gonzalez DO;  Location:  MAIN OR;  Service: Gynecology    RHINOPLASTY  2013    WISDOM TOOTH EXTRACTION     [3]   Family History  Problem Relation Name Age of Onset    Hypertension Mother      Lymphoma Father      No Known Problems Sister      No Known Problems Brother      Asthma Maternal Grandmother      Diabetes Maternal Grandfather      Colon cancer Paternal Grandmother          type 1 diabetes    No Known Problems Paternal Grandfather      No Known Problems Maternal Aunt      No Known Problems Maternal Uncle      No Known Problems Paternal Aunt      No Known Problems Paternal Uncle      ADD / ADHD Neg Hx      Anesthesia problems Neg Hx      Cancer Neg Hx      Clotting disorder Neg Hx      Collagen disease Neg Hx      Dislocations Neg Hx      Learning disabilities Neg Hx      Neurological problems Neg Hx      Osteoporosis Neg Hx      Rheumatologic disease Neg Hx      Scoliosis Neg Hx      Vascular Disease Neg Hx     [4]   Social History  Tobacco Use    Smoking status: Never    Smokeless tobacco: Never   Vaping Use    Vaping status: Never Used   Substance Use Topics    Alcohol use: Not Currently     Comment: occ    Drug use: No        Abebe Shine PA-C  05/25/25 0514

## 2025-05-27 ENCOUNTER — APPOINTMENT (OUTPATIENT)
Dept: LAB | Facility: CLINIC | Age: 30
End: 2025-05-27
Payer: COMMERCIAL

## 2025-05-27 DIAGNOSIS — O20.9 VAGINAL BLEEDING IN PREGNANCY, FIRST TRIMESTER: ICD-10-CM

## 2025-05-27 LAB — B-HCG SERPL-ACNC: 3.9 MIU/ML (ref 0–5)

## 2025-05-27 PROCEDURE — 36415 COLL VENOUS BLD VENIPUNCTURE: CPT

## 2025-05-27 PROCEDURE — 84702 CHORIONIC GONADOTROPIN TEST: CPT

## 2025-07-21 ENCOUNTER — TELEPHONE (OUTPATIENT)
Age: 30
End: 2025-07-21

## 2025-07-21 DIAGNOSIS — Z32.01 POSITIVE URINE PREGNANCY TEST: Primary | ICD-10-CM

## 2025-07-21 NOTE — TELEPHONE ENCOUNTER
Patient saw the message for HCG request sent to Dr. Magallanes. But she wants it to go to Dr. Fregoso.

## 2025-07-21 NOTE — TELEPHONE ENCOUNTER
Patient calling to report + UPT since last Monday 7/14/25. LMP 6/13/25 = 5d3w. Patient reports SAB 2 months ago and is requesting HCG trend at this time.     Message to Dr. Magallanes.

## 2025-07-23 ENCOUNTER — APPOINTMENT (OUTPATIENT)
Dept: LAB | Facility: CLINIC | Age: 30
End: 2025-07-23
Attending: STUDENT IN AN ORGANIZED HEALTH CARE EDUCATION/TRAINING PROGRAM
Payer: COMMERCIAL

## 2025-07-23 DIAGNOSIS — Z32.01 POSITIVE URINE PREGNANCY TEST: ICD-10-CM

## 2025-07-23 LAB — B-HCG SERPL-ACNC: 3173 MIU/ML (ref 0–5)

## 2025-07-23 PROCEDURE — 36415 COLL VENOUS BLD VENIPUNCTURE: CPT

## 2025-07-23 PROCEDURE — 84702 CHORIONIC GONADOTROPIN TEST: CPT

## 2025-07-25 ENCOUNTER — APPOINTMENT (OUTPATIENT)
Dept: LAB | Facility: CLINIC | Age: 30
End: 2025-07-25
Payer: COMMERCIAL

## 2025-07-25 DIAGNOSIS — Z32.01 POSITIVE URINE PREGNANCY TEST: ICD-10-CM

## 2025-07-25 LAB — B-HCG SERPL-ACNC: 8088 MIU/ML (ref 0–5)

## 2025-07-25 PROCEDURE — 84702 CHORIONIC GONADOTROPIN TEST: CPT

## 2025-07-25 PROCEDURE — 36415 COLL VENOUS BLD VENIPUNCTURE: CPT

## 2025-07-30 ENCOUNTER — PATIENT MESSAGE (OUTPATIENT)
Dept: OBGYN CLINIC | Facility: CLINIC | Age: 30
End: 2025-07-30

## 2025-07-30 ENCOUNTER — APPOINTMENT (OUTPATIENT)
Dept: LAB | Facility: CLINIC | Age: 30
End: 2025-07-30
Payer: COMMERCIAL

## 2025-07-30 ENCOUNTER — NURSE TRIAGE (OUTPATIENT)
Age: 30
End: 2025-07-30

## 2025-07-30 DIAGNOSIS — Z32.01 POSITIVE URINE PREGNANCY TEST: ICD-10-CM

## 2025-07-30 DIAGNOSIS — N39.0 UTI (URINARY TRACT INFECTION), UNCOMPLICATED: Primary | ICD-10-CM

## 2025-07-30 DIAGNOSIS — N39.0 UTI (URINARY TRACT INFECTION), UNCOMPLICATED: ICD-10-CM

## 2025-07-30 LAB
B-HCG SERPL-ACNC: ABNORMAL MIU/ML (ref 0–5)
BACTERIA UR QL AUTO: ABNORMAL /HPF
BILIRUB UR QL STRIP: NEGATIVE
CLARITY UR: CLEAR
COLOR UR: COLORLESS
GLUCOSE UR STRIP-MCNC: NEGATIVE MG/DL
HGB UR QL STRIP.AUTO: NEGATIVE
KETONES UR STRIP-MCNC: NEGATIVE MG/DL
LEUKOCYTE ESTERASE UR QL STRIP: ABNORMAL
NITRITE UR QL STRIP: NEGATIVE
NON-SQ EPI CELLS URNS QL MICRO: ABNORMAL /HPF
PH UR STRIP.AUTO: 6.5 [PH]
PROT UR STRIP-MCNC: NEGATIVE MG/DL
RBC #/AREA URNS AUTO: ABNORMAL /HPF
SP GR UR STRIP.AUTO: 1 (ref 1–1.03)
UROBILINOGEN UR STRIP-ACNC: <2 MG/DL
WBC #/AREA URNS AUTO: ABNORMAL /HPF

## 2025-07-30 PROCEDURE — 84702 CHORIONIC GONADOTROPIN TEST: CPT

## 2025-07-30 PROCEDURE — 87086 URINE CULTURE/COLONY COUNT: CPT

## 2025-07-30 PROCEDURE — 87077 CULTURE AEROBIC IDENTIFY: CPT

## 2025-07-30 PROCEDURE — 81001 URINALYSIS AUTO W/SCOPE: CPT

## 2025-07-30 PROCEDURE — 36415 COLL VENOUS BLD VENIPUNCTURE: CPT

## 2025-07-30 PROCEDURE — 87186 SC STD MICRODIL/AGAR DIL: CPT

## 2025-08-01 LAB — BACTERIA UR CULT: ABNORMAL

## 2025-08-05 ENCOUNTER — HOSPITAL ENCOUNTER (EMERGENCY)
Facility: HOSPITAL | Age: 30
Discharge: HOME | End: 2025-08-05
Attending: EMERGENCY MEDICINE | Admitting: EMERGENCY MEDICINE
Payer: COMMERCIAL

## 2025-08-05 ENCOUNTER — APPOINTMENT (EMERGENCY)
Dept: ULTRASOUND IMAGING | Facility: HOSPITAL | Age: 30
End: 2025-08-05
Payer: COMMERCIAL

## 2025-08-05 ENCOUNTER — NURSE TRIAGE (OUTPATIENT)
Age: 30
End: 2025-08-05

## 2025-08-05 ENCOUNTER — TELEPHONE (OUTPATIENT)
Dept: LABOR AND DELIVERY | Facility: HOSPITAL | Age: 30
End: 2025-08-05

## 2025-08-06 ENCOUNTER — OFFICE VISIT (OUTPATIENT)
Dept: OBGYN CLINIC | Facility: CLINIC | Age: 30
End: 2025-08-06
Payer: COMMERCIAL

## 2025-08-06 VITALS — DIASTOLIC BLOOD PRESSURE: 68 MMHG | SYSTOLIC BLOOD PRESSURE: 112 MMHG | WEIGHT: 153 LBS | BODY MASS INDEX: 25.46 KG/M2

## 2025-08-06 DIAGNOSIS — R10.2 VAGINAL PAIN: Primary | ICD-10-CM

## 2025-08-06 DIAGNOSIS — N39.0 UTI (URINARY TRACT INFECTION), UNCOMPLICATED: ICD-10-CM

## 2025-08-06 PROCEDURE — 99213 OFFICE O/P EST LOW 20 MIN: CPT

## 2025-08-08 LAB
BV BACTERIA RRNA VAG QL NAA+PROBE: POSITIVE
C GLABRATA RNA VAG QL NAA+PROBE: NOT DETECTED
C TRACH RRNA SPEC QL NAA+PROBE: NOT DETECTED
CANDIDA RRNA VAG QL PROBE: NOT DETECTED
N GONORRHOEA RRNA SPEC QL NAA+PROBE: NOT DETECTED
T VAGINALIS RRNA SPEC QL NAA+PROBE: NOT DETECTED

## 2025-08-11 ENCOUNTER — RESULTS FOLLOW-UP (OUTPATIENT)
Age: 30
End: 2025-08-11

## 2025-08-11 DIAGNOSIS — B96.89 BACTERIAL VAGINOSIS IN PREGNANCY: Primary | ICD-10-CM

## 2025-08-11 DIAGNOSIS — O23.599 BACTERIAL VAGINOSIS IN PREGNANCY: Primary | ICD-10-CM

## 2025-08-11 RX ORDER — CLINDAMYCIN PHOSPHATE 20 MG/G
1 CREAM VAGINAL
Qty: 40 G | Refills: 0 | Status: SHIPPED | OUTPATIENT
Start: 2025-08-11 | End: 2025-08-18

## 2025-08-20 DIAGNOSIS — F41.9 ANXIETY: ICD-10-CM

## 2025-08-20 DIAGNOSIS — M54.9 BACK PAIN IN PREGNANCY: Primary | ICD-10-CM

## 2025-08-20 DIAGNOSIS — O99.891 BACK PAIN IN PREGNANCY: Primary | ICD-10-CM

## (undated) DEVICE — CAST PADDING 4 IN SYNTHETIC NON-STRL

## (undated) DEVICE — GAUZE SPONGES,16 PLY: Brand: CURITY

## (undated) DEVICE — GLOVE PI ULTRA TOUCH SZ.7.0

## (undated) DEVICE — PADDING CAST 3IN COTTON STRL

## (undated) DEVICE — STANDARD SURGICAL GOWN, L: Brand: CONVERTORS

## (undated) DEVICE — COBAN 2 IN UNSTERILE

## (undated) DEVICE — PACK GENERAL LF

## (undated) DEVICE — DRAPE SHEET THREE QUARTER

## (undated) DEVICE — 2000CC GUARDIAN II: Brand: GUARDIAN

## (undated) DEVICE — INTENDED FOR TISSUE SEPARATION, AND OTHER PROCEDURES THAT REQUIRE A SHARP SURGICAL BLADE TO PUNCTURE OR CUT.: Brand: BARD-PARKER SAFETY BLADES SIZE 15, STERILE

## (undated) DEVICE — PAD CAST 4 IN COTTON NON STERILE

## (undated) DEVICE — DRAPE C-ARM X-RAY

## (undated) DEVICE — BRUSH EZ SCRUB PCMX W/NAIL CLEANER

## (undated) DEVICE — BETHLEHEM UNIVERSAL MINOR VAG: Brand: CARDINAL HEALTH

## (undated) DEVICE — SYRINGE 10ML LL CONTROL TOP

## (undated) DEVICE — LABEL MEDICATION STERILE 2 YELLOW LIDOCAINE 2 BLUE MARCAINE 2 ORANGE HEPARIN

## (undated) DEVICE — BASIC DOUBLE BASIN 2-LF: Brand: MEDLINE INDUSTRIES, INC.

## (undated) DEVICE — CURITY NON-ADHERENT STRIPS: Brand: CURITY

## (undated) DEVICE — TIBURON EXTREMITY SHEET: Brand: CONVERTORS

## (undated) DEVICE — ASTOUND STANDARD SURGICAL GOWN, XL: Brand: CONVERTORS

## (undated) DEVICE — NEEDLE 25G X 1 1/2

## (undated) DEVICE — NON-STERILE REUSABLE TOURNIQUET CUFF SINGLE BLADDER, DUAL PORT AND QUICK CONNECT CONNECTOR: Brand: COLOR CUFF

## (undated) DEVICE — GLOVE INDICATOR PI UNDERGLOVE SZ 7.5 BLUE

## (undated) DEVICE — STOCKINETTE 6 IN COTTON NS 1 PLY

## (undated) DEVICE — ACE WRAP 3 IN UNSTERILE

## (undated) DEVICE — BANDAGE, ESMARK LF STR 4"X9'(20/CS): Brand: CYPRESS